# Patient Record
Sex: FEMALE | Race: WHITE | Employment: OTHER | URBAN - METROPOLITAN AREA
[De-identification: names, ages, dates, MRNs, and addresses within clinical notes are randomized per-mention and may not be internally consistent; named-entity substitution may affect disease eponyms.]

---

## 2017-01-05 ENCOUNTER — OFFICE VISIT (OUTPATIENT)
Dept: INTERNAL MEDICINE CLINIC | Facility: CLINIC | Age: 77
End: 2017-01-05

## 2017-01-05 ENCOUNTER — TELEPHONE (OUTPATIENT)
Dept: INTERNAL MEDICINE CLINIC | Facility: CLINIC | Age: 77
End: 2017-01-05

## 2017-01-05 VITALS
WEIGHT: 163 LBS | HEIGHT: 61 IN | BODY MASS INDEX: 30.78 KG/M2 | RESPIRATION RATE: 16 BRPM | HEART RATE: 58 BPM | DIASTOLIC BLOOD PRESSURE: 100 MMHG | SYSTOLIC BLOOD PRESSURE: 235 MMHG

## 2017-01-05 DIAGNOSIS — I10 ESSENTIAL HYPERTENSION: ICD-10-CM

## 2017-01-05 DIAGNOSIS — M43.16 SPONDYLOLISTHESIS OF LUMBAR REGION: Primary | ICD-10-CM

## 2017-01-05 DIAGNOSIS — S52.509A CLOSED FRACTURE OF DISTAL END OF RADIUS, UNSPECIFIED FRACTURE MORPHOLOGY, INITIAL ENCOUNTER: ICD-10-CM

## 2017-01-05 PROCEDURE — G0463 HOSPITAL OUTPT CLINIC VISIT: HCPCS | Performed by: INTERNAL MEDICINE

## 2017-01-05 PROCEDURE — 99213 OFFICE O/P EST LOW 20 MIN: CPT | Performed by: INTERNAL MEDICINE

## 2017-01-05 RX ORDER — SPIRONOLACTONE 25 MG/1
25 TABLET ORAL 2 TIMES DAILY
Qty: 60 TABLET | Refills: 1 | Status: SHIPPED | OUTPATIENT
Start: 2017-01-05 | End: 2017-01-23

## 2017-01-05 RX ORDER — POTASSIUM CITRATE 10 MEQ/1
TABLET, EXTENDED RELEASE ORAL
Qty: 60 TABLET | Refills: 11 | Status: SHIPPED | OUTPATIENT
Start: 2017-01-05 | End: 2017-01-23

## 2017-01-05 RX ORDER — HYDROCODONE BITARTRATE AND ACETAMINOPHEN 5; 325 MG/1; MG/1
1 TABLET ORAL 2 TIMES DAILY PRN
Qty: 30 TABLET | Refills: 0 | Status: SHIPPED | OUTPATIENT
Start: 2017-01-05 | End: 2017-03-02

## 2017-01-05 RX ORDER — LABETALOL 300 MG/1
300 TABLET, FILM COATED ORAL 2 TIMES DAILY
Qty: 60 TABLET | Refills: 0 | Status: SHIPPED | OUTPATIENT
Start: 2017-01-05 | End: 2017-01-23

## 2017-01-05 RX ORDER — AMLODIPINE BESYLATE 2.5 MG/1
7.5 TABLET ORAL DAILY
Qty: 30 TABLET | Refills: 0 | Status: SHIPPED | OUTPATIENT
Start: 2017-01-05 | End: 2017-01-23

## 2017-01-05 NOTE — PROGRESS NOTES
New pt visit for me  Past Medical History   Diagnosis Date   • Unspecified essential hypertension    • Peripheral vascular disease ([address]      stents in left iliac and left femoral artery and both proximal renal arteries   • Renal artery stenosis (HCC)    • History Narrative    The patient does use an assistive device. Wheeled walker    The patient does not live in a home with stairs.          Family History   Problem Relation Age of Onset   • Cancer Father      Lung   • Lung Disorder Mother      Emphysema   • lamoTRIgine (LAMICTAL) 150 MG Oral Tab, Take 1 tablet by mouth 2 (two) times daily. , Disp: , Rfl:   •  QUEtiapine Fumarate (SEROQUEL) 50 MG Oral Tab, Take 1 tablet by mouth nightly., Disp: , Rfl:   •  ROPINIRole HCl (REQUIP) 4 MG Oral Tab, , Disp: , Rfl:

## 2017-01-05 NOTE — TELEPHONE ENCOUNTER
Potassium Citrate ER 10 MEQ (1080 MG) Oral Tab CR and  spironolactone 25 MG Oral Tab was prescribe today. Pharmacy stts when taking medication together potassium level will increase.   Pharmacy is requesting most recent potassium level to dispense medicat

## 2017-01-06 NOTE — TELEPHONE ENCOUNTER
Please call pharmacy they can dispense Spironolactone, but potassium should be clarified with PCP I do not know patient and sometimes it is okay to take both medications, so clarified with Dr. Danita Jones tomorrow about potassium prescription

## 2017-01-06 NOTE — TELEPHONE ENCOUNTER
Informed Pharmacy to dispense Spironolactone and hold potassium until Dr. Severino Fulling review and advise. Verbalized understanding.

## 2017-01-23 ENCOUNTER — TELEPHONE (OUTPATIENT)
Dept: INTERNAL MEDICINE CLINIC | Facility: CLINIC | Age: 77
End: 2017-01-23

## 2017-01-23 RX ORDER — LABETALOL 300 MG/1
300 TABLET, FILM COATED ORAL 2 TIMES DAILY
Qty: 60 TABLET | Refills: 0 | Status: SHIPPED | OUTPATIENT
Start: 2017-01-23 | End: 2017-05-11

## 2017-01-23 RX ORDER — AMLODIPINE BESYLATE 2.5 MG/1
7.5 TABLET ORAL DAILY
Qty: 30 TABLET | Refills: 0 | Status: SHIPPED | OUTPATIENT
Start: 2017-01-23 | End: 2017-01-24

## 2017-01-23 RX ORDER — SPIRONOLACTONE 25 MG/1
25 TABLET ORAL 2 TIMES DAILY
Qty: 60 TABLET | Refills: 1 | Status: SHIPPED | OUTPATIENT
Start: 2017-01-23 | End: 2017-05-11

## 2017-01-23 RX ORDER — POTASSIUM CITRATE 10 MEQ/1
TABLET, EXTENDED RELEASE ORAL
Qty: 60 TABLET | Refills: 11 | Status: SHIPPED | OUTPATIENT
Start: 2017-01-23 | End: 2017-03-02

## 2017-01-23 RX ORDER — PANTOPRAZOLE SODIUM 40 MG/1
TABLET, DELAYED RELEASE ORAL
Qty: 90 TABLET | Refills: 0 | Status: SHIPPED | OUTPATIENT
Start: 2017-01-23 | End: 2017-05-11

## 2017-01-23 NOTE — TELEPHONE ENCOUNTER
Per pharmacy on file it is pt new pharmacy and need pt med send to them: ,    Atorvastatin Calcium 40 MG   Hydralezin  Effient  Amlodipine Besylate  Pantoprazole  Per pharmacy and whatever pt current rx med on file. Current Outpatient Prescriptions:   AmLO

## 2017-01-24 ENCOUNTER — TELEPHONE (OUTPATIENT)
Dept: INTERNAL MEDICINE CLINIC | Facility: CLINIC | Age: 77
End: 2017-01-24

## 2017-01-24 RX ORDER — HYDRALAZINE HYDROCHLORIDE 50 MG/1
50 TABLET, FILM COATED ORAL 3 TIMES DAILY
Qty: 90 TABLET | Refills: 2 | Status: SHIPPED | OUTPATIENT
Start: 2017-01-24 | End: 2017-03-02

## 2017-01-24 RX ORDER — AMLODIPINE BESYLATE 2.5 MG/1
7.5 TABLET ORAL DAILY
Qty: 90 TABLET | Refills: 2 | Status: SHIPPED | OUTPATIENT
Start: 2017-01-24 | End: 2017-03-02

## 2017-01-24 NOTE — TELEPHONE ENCOUNTER
Refill Protocol Appointment Criteria Gastrointestional Medication:  Medication refilled for 90 days per protocol.     · Appointment scheduled in the past 12 months or in the next 3 months  Recent Visits       Provider Department Primary Dx    2 weeks ago Fi

## 2017-01-25 ENCOUNTER — TELEPHONE (OUTPATIENT)
Dept: INTERNAL MEDICINE CLINIC | Facility: CLINIC | Age: 77
End: 2017-01-25

## 2017-01-25 RX ORDER — ATORVASTATIN CALCIUM 40 MG/1
40 TABLET, FILM COATED ORAL NIGHTLY
Qty: 30 TABLET | Refills: 2 | Status: SHIPPED | OUTPATIENT
Start: 2017-01-25 | End: 2017-05-11

## 2017-01-25 NOTE — TELEPHONE ENCOUNTER
Pharmacy requesting to clarify dose and instructions for AmLODIPine Besylate 2.5 MG Oral Tab                                                                                   Sig :  Take 3 tablets (7.5 mg total) by mouth daily

## 2017-01-25 NOTE — TELEPHONE ENCOUNTER
Cholesterol Medications  Protocol Criteria:  · Appointment scheduled in the past 12 months or in the next 3 months  · ALT & LDL on file in the past 12 months  · ALT result < 80  · LDL result <130   Recent Visits       Provider Department Primary Dx    2 we

## 2017-02-23 ENCOUNTER — TELEPHONE (OUTPATIENT)
Dept: INTERNAL MEDICINE CLINIC | Facility: CLINIC | Age: 77
End: 2017-02-23

## 2017-02-23 NOTE — TELEPHONE ENCOUNTER
Pharmacy calling to check on status of RX for Ferrous Sulfate 325 MG (65FE)  Pharmacy states patient used to get this at a different pharmacy and different doctor  and now will get it at [facility] please

## 2017-02-28 RX ORDER — MELATONIN
325 2 TIMES DAILY WITH MEALS
Qty: 60 TABLET | Refills: 7 | Status: SHIPPED | OUTPATIENT
Start: 2017-02-28 | End: 2017-03-02

## 2017-03-02 ENCOUNTER — OFFICE VISIT (OUTPATIENT)
Dept: INTERNAL MEDICINE CLINIC | Facility: CLINIC | Age: 77
End: 2017-03-02

## 2017-03-02 VITALS
HEIGHT: 61 IN | WEIGHT: 164 LBS | SYSTOLIC BLOOD PRESSURE: 193 MMHG | BODY MASS INDEX: 30.96 KG/M2 | HEART RATE: 70 BPM | TEMPERATURE: 98 F | DIASTOLIC BLOOD PRESSURE: 82 MMHG

## 2017-03-02 DIAGNOSIS — M48.04 THORACIC SPINAL STENOSIS: ICD-10-CM

## 2017-03-02 DIAGNOSIS — E11.22 CKD STAGE 3 DUE TO TYPE 2 DIABETES MELLITUS (HCC): ICD-10-CM

## 2017-03-02 DIAGNOSIS — E11.9 WELL CONTROLLED TYPE 2 DIABETES MELLITUS (HCC): ICD-10-CM

## 2017-03-02 DIAGNOSIS — F31.9 BIPOLAR 1 DISORDER (HCC): ICD-10-CM

## 2017-03-02 DIAGNOSIS — I25.10 CORONARY ARTERY DISEASE DUE TO LIPID RICH PLAQUE: ICD-10-CM

## 2017-03-02 DIAGNOSIS — G89.4 CHRONIC PAIN SYNDROME: ICD-10-CM

## 2017-03-02 DIAGNOSIS — Z79.899 POLYPHARMACY: ICD-10-CM

## 2017-03-02 DIAGNOSIS — N18.30 CKD STAGE 3 DUE TO TYPE 2 DIABETES MELLITUS (HCC): ICD-10-CM

## 2017-03-02 DIAGNOSIS — I73.9 PAD (PERIPHERAL ARTERY DISEASE) (HCC): ICD-10-CM

## 2017-03-02 DIAGNOSIS — I10 ESSENTIAL HYPERTENSION: Primary | ICD-10-CM

## 2017-03-02 DIAGNOSIS — M54.40 CHRONIC LOW BACK PAIN WITH SCIATICA, SCIATICA LATERALITY UNSPECIFIED, UNSPECIFIED BACK PAIN LATERALITY: ICD-10-CM

## 2017-03-02 DIAGNOSIS — M48.061 LUMBAR SPINAL STENOSIS: ICD-10-CM

## 2017-03-02 DIAGNOSIS — Z00.00 ENCOUNTER FOR ANNUAL HEALTH EXAMINATION: ICD-10-CM

## 2017-03-02 DIAGNOSIS — I25.83 CORONARY ARTERY DISEASE DUE TO LIPID RICH PLAQUE: ICD-10-CM

## 2017-03-02 DIAGNOSIS — Z00.00 ANNUAL PHYSICAL EXAM: ICD-10-CM

## 2017-03-02 DIAGNOSIS — F17.200 SMOKER UNMOTIVATED TO QUIT: ICD-10-CM

## 2017-03-02 DIAGNOSIS — G89.29 CHRONIC LOW BACK PAIN WITH SCIATICA, SCIATICA LATERALITY UNSPECIFIED, UNSPECIFIED BACK PAIN LATERALITY: ICD-10-CM

## 2017-03-02 PROBLEM — Z86.73 STATUS POST CVA: Status: RESOLVED | Noted: 2017-03-02 | Resolved: 2017-03-02

## 2017-03-02 PROBLEM — Z86.73 STATUS POST CVA: Status: ACTIVE | Noted: 2017-03-02

## 2017-03-02 PROBLEM — Z74.09 IMPAIRED MOBILITY: Status: ACTIVE | Noted: 2017-03-02

## 2017-03-02 PROCEDURE — G0439 PPPS, SUBSEQ VISIT: HCPCS | Performed by: INTERNAL MEDICINE

## 2017-03-02 RX ORDER — HYDRALAZINE HYDROCHLORIDE 50 MG/1
50 TABLET, FILM COATED ORAL 3 TIMES DAILY
Qty: 90 TABLET | Refills: 3 | Status: SHIPPED | OUTPATIENT
Start: 2017-03-02 | End: 2018-06-23

## 2017-03-02 RX ORDER — LAMOTRIGINE 150 MG/1
150 TABLET ORAL 2 TIMES DAILY
Qty: 60 TABLET | Refills: 3 | Status: CANCELLED | OUTPATIENT
Start: 2017-03-02

## 2017-03-02 RX ORDER — METOPROLOL SUCCINATE 50 MG/1
50 TABLET, EXTENDED RELEASE ORAL DAILY
Qty: 90 TABLET | Refills: 3 | Status: SHIPPED | OUTPATIENT
Start: 2017-03-02 | End: 2018-02-25

## 2017-03-02 RX ORDER — LABETALOL 300 MG/1
300 TABLET, FILM COATED ORAL 2 TIMES DAILY
Qty: 60 TABLET | Refills: 3 | Status: ON HOLD | OUTPATIENT
Start: 2017-03-02 | End: 2018-06-08

## 2017-03-02 RX ORDER — POTASSIUM CITRATE 10 MEQ/1
TABLET, EXTENDED RELEASE ORAL
Qty: 60 TABLET | Refills: 11 | Status: ON HOLD | OUTPATIENT
Start: 2017-03-02 | End: 2018-06-08

## 2017-03-02 RX ORDER — NAPROXEN 500 MG/1
500 TABLET ORAL 2 TIMES DAILY WITH MEALS
Qty: 60 TABLET | Refills: 3 | Status: SHIPPED | OUTPATIENT
Start: 2017-03-02 | End: 2018-02-25

## 2017-03-02 RX ORDER — MELATONIN
325 2 TIMES DAILY WITH MEALS
Qty: 60 TABLET | Refills: 3 | Status: SHIPPED | OUTPATIENT
Start: 2017-03-02 | End: 2018-12-14

## 2017-03-03 NOTE — PATIENT INSTRUCTIONS
Tanisha Patino's SCREENING SCHEDULE   Tests on this list are recommended by your physician but may not be covered, or covered at this frequency, by your insurer. Please check with your insurance carrier before scheduling to verify coverage.    PREVENTATI Men who are 73-68 years old and have smoked more than 100 cigarettes in their lifetime   • Anyone with a family history    Colorectal Cancer Screening  Covered up to Age 76     Colonoscopy Screen   Covered every 10 years- more often if abnormal Colonoscopy visit on 11/14/14  -FLU VACC PRSV FREE INC ANTIG    Please get every year    Pneumococcal 13 (Prevnar)  Covered Once after 65 No orders found for this or any previous visit.  Please get once after your 65th birthday    Pneumococcal 23 (Pneumovax)  Covered O

## 2017-03-03 NOTE — PROGRESS NOTES
HPI:   Nelson Arreola is a 68year old female who presents for a Medicare Initial Annual Wellness visit (Once after 12 month Medicare anniversery) .     New to our practice today was hospitalized at [facility] November 2016  Annual Physical due on (50 mg total) by mouth 3 (three) times daily. Labetalol HCl 300 MG Oral Tab Take 1 tablet (300 mg total) by mouth 2 (two) times daily. Potassium Citrate ER 10 MEQ (1080 MG) Oral Tab CR TAKE 1 TABLET BY MOUTH 2 TIMES DAILY AFTER A MEAL.  MUST TAKE WITH F colonoscopy (N/A, 11/26/2016); and egd (N/A, 11/26/2016). Her family history includes Cancer in her father, mother, and another family member; Diabetes in her brother and mother; Lung Disorder in her mother.    SOCIAL HISTORY:   She  reports that she has No   I get confused about where sounds come from:  No I misunderstand some   words in a sentence and need to ask people to repeat themselves:  No   I especially have trouble understanding the speech of women and children:    No I have trouble understanding Skin color, texture, turgor normal, no rashes or lesions   Lymph nodes: Cervical, supraclavicular, and axillary nodes normal   Neurologic: Normal       SUGGESTED VACCINATIONS - Influenza, Pneumococcal, Zoster, Tetanus     Immunization History   Administere 150 MG Oral Tab; Take 1 tablet (150 mg total) by mouth 2 (two) times daily.  -     naproxen 500 MG Oral Tab; Take 1 tablet (500 mg total) by mouth 2 (two) times daily with meals. -     Metoprolol Succinate ER (TOPROL XL) 50 MG Oral Tablet 24 Hr;  Take 1 ta and has it on her medication list.     Diet assessment: good     Advanced Directive:  Living Will on file in [address]? Chanel Grewal does not have a Living Will on file in [address]  Discussed with patient and provided information      [address] accidently lose urine?: 0-No    Do you have difficulty seeing?: 0-No    Do you have any difficulty walking or getting up?: 1-Yes    Do you have any tripping hazards?: 0-No    Are you on multiple medications?: 1-Yes    Does pain affect your day to day activ Screen every 10 years Colonoscopy,10 Years due on 11/26/2026 Update Health Maintenance if applicable    Flex Sigmoidoscopy Screen every 10 years No results found for this or any previous visit. No flowsheet data found.      Fecal Occult Blood Annually OCC 09/29/2016 1.28    No flowsheet data found. BUN  Annually BUN (mg/dL)   Date Value   11/25/2016 32*   09/29/2016 16    No flowsheet data found. Drug Serum Conc  Annually No results found for: DIGOXIN, DIG, VALP No flowsheet data found.     Diabetes

## 2017-03-04 ENCOUNTER — TELEPHONE (OUTPATIENT)
Dept: INTERNAL MEDICINE CLINIC | Facility: CLINIC | Age: 77
End: 2017-03-04

## 2017-03-04 NOTE — TELEPHONE ENCOUNTER
Message from Jules Wilder MD sent at 3/2/2017  6:20 PM CST -----        Pt was referred to Optho and POdiatry         Please call her to let herknow       Also please check [employer] to see if a pneumovax history is available      Patient called and in

## 2017-03-06 ENCOUNTER — LAB ENCOUNTER (OUTPATIENT)
Dept: LAB | Age: 77
End: 2017-03-06
Attending: INTERNAL MEDICINE
Payer: MEDICARE

## 2017-03-06 ENCOUNTER — PHARMACIST (OUTPATIENT)
Dept: FAMILY MEDICINE CLINIC | Facility: CLINIC | Age: 77
End: 2017-03-06

## 2017-03-06 VITALS — HEART RATE: 66 BPM | DIASTOLIC BLOOD PRESSURE: 78 MMHG | SYSTOLIC BLOOD PRESSURE: 188 MMHG

## 2017-03-06 DIAGNOSIS — G89.4 CHRONIC PAIN SYNDROME: ICD-10-CM

## 2017-03-06 DIAGNOSIS — E11.9 WELL CONTROLLED TYPE 2 DIABETES MELLITUS (HCC): ICD-10-CM

## 2017-03-06 DIAGNOSIS — M48.04 THORACIC SPINAL STENOSIS: ICD-10-CM

## 2017-03-06 DIAGNOSIS — M48.061 LUMBAR SPINAL STENOSIS: ICD-10-CM

## 2017-03-06 DIAGNOSIS — Z00.00 ANNUAL PHYSICAL EXAM: ICD-10-CM

## 2017-03-06 DIAGNOSIS — I10 ESSENTIAL HYPERTENSION: Primary | ICD-10-CM

## 2017-03-06 LAB
ALBUMIN SERPL BCP-MCNC: 4.2 G/DL (ref 3.5–4.8)
ALBUMIN/GLOB SERPL: 1.9 {RATIO} (ref 1–2)
ALP SERPL-CCNC: 84 U/L (ref 32–100)
ALT SERPL-CCNC: 57 U/L (ref 14–54)
ANION GAP SERPL CALC-SCNC: 10 MMOL/L (ref 0–18)
AST SERPL-CCNC: 59 U/L (ref 15–41)
BASOPHILS # BLD: 0.1 K/UL (ref 0–0.2)
BASOPHILS NFR BLD: 1 %
BILIRUB SERPL-MCNC: 0.7 MG/DL (ref 0.3–1.2)
BUN SERPL-MCNC: 23 MG/DL (ref 8–20)
BUN/CREAT SERPL: 19.8 (ref 10–20)
CALCIUM SERPL-MCNC: 10.2 MG/DL (ref 8.5–10.5)
CHLORIDE SERPL-SCNC: 106 MMOL/L (ref 95–110)
CO2 SERPL-SCNC: 26 MMOL/L (ref 22–32)
CREAT SERPL-MCNC: 1.16 MG/DL (ref 0.5–1.5)
EOSINOPHIL # BLD: 0.2 K/UL (ref 0–0.7)
EOSINOPHIL NFR BLD: 4 %
ERYTHROCYTE [DISTWIDTH] IN BLOOD BY AUTOMATED COUNT: 16.3 % (ref 11–15)
GLOBULIN PLAS-MCNC: 2.2 G/DL (ref 2.5–3.7)
GLUCOSE SERPL-MCNC: 135 MG/DL (ref 70–99)
HCT VFR BLD AUTO: 42.6 % (ref 35–48)
HGB BLD-MCNC: 14 G/DL (ref 12–16)
LYMPHOCYTES # BLD: 1.3 K/UL (ref 1–4)
LYMPHOCYTES NFR BLD: 23 %
MCH RBC QN AUTO: 28 PG (ref 27–32)
MCHC RBC AUTO-ENTMCNC: 32.9 G/DL (ref 32–37)
MCV RBC AUTO: 85.2 FL (ref 80–100)
MONOCYTES # BLD: 0.3 K/UL (ref 0–1)
MONOCYTES NFR BLD: 5 %
NEUTROPHILS # BLD AUTO: 3.8 K/UL (ref 1.8–7.7)
NEUTROPHILS NFR BLD: 67 %
OSMOLALITY UR CALC.SUM OF ELEC: 300 MOSM/KG (ref 275–295)
PLATELET # BLD AUTO: 303 K/UL (ref 140–400)
PMV BLD AUTO: 8 FL (ref 7.4–10.3)
POTASSIUM SERPL-SCNC: 4.2 MMOL/L (ref 3.3–5.1)
PROT SERPL-MCNC: 6.4 G/DL (ref 5.9–8.4)
RBC # BLD AUTO: 5 M/UL (ref 3.7–5.4)
SODIUM SERPL-SCNC: 142 MMOL/L (ref 136–144)
TSH SERPL-ACNC: 1.29 UIU/ML (ref 0.34–5.6)
WBC # BLD AUTO: 5.7 K/UL (ref 4–11)

## 2017-03-06 PROCEDURE — 80053 COMPREHEN METABOLIC PANEL: CPT

## 2017-03-06 PROCEDURE — 83036 HEMOGLOBIN GLYCOSYLATED A1C: CPT

## 2017-03-06 PROCEDURE — 85025 COMPLETE CBC W/AUTO DIFF WBC: CPT

## 2017-03-06 PROCEDURE — 84443 ASSAY THYROID STIM HORMONE: CPT

## 2017-03-06 PROCEDURE — 36415 COLL VENOUS BLD VENIPUNCTURE: CPT

## 2017-03-06 RX ORDER — DIAZEPAM 10 MG/1
20 TABLET ORAL NIGHTLY PRN
Status: ON HOLD | COMMUNITY
End: 2018-06-08

## 2017-03-06 NOTE — PROGRESS NOTES
Deepthi Campos is a 68year old patient coming in to see the clinical pharmacist for an initial medication therapy management visit.     HPI: Patient presents with all of her medication bottles and her caretaker nurse who visits her twice weekly for around is listed on her medication list   Adherence: Patient reported that she skiped her morning medications today because she woke up much later than usual. She states this does not occur often.    Timing of dosing: Patient is aware of when to take her meds and RENAL/HEPATIC IMPAIRMENT DOSING: GFR of 30-49 mL/min, maximum dose of spironolactone recommended is 25 mg once daily. Patient's GFR from 11/25/16 was 42 mL/min, and she is currently taking spironolactone 25 mg 1 tab PO BID.      IMMUNIZATIONS: Patient r • Diabetes Brother        SH:   Social History   Marital Status:   Spouse Name: N/A    Years of Education: N/A  Number of Children: N/A     Occupational History  None on file     Social History Main Topics   Smoking status: Current Every Day Smok Tab Take 1 tablet (25 mg total) by mouth 2 (two) times daily.  Disp: 60 tablet Rfl: 1   Pantoprazole Sodium 40 MG Oral Tab EC TAKE 1 TABLET BY MOUTH ONCE A DAY BEFORE BREAKFAST Disp: 90 tablet Rfl: 0   mirtazapine (REMERON) 15 MG Oral Tab Take 15 mg by mout avoid restless leg syndrome symptoms. Patient reports that they may try this change and will see how they respond.   -Explained to patient that when ASA 81 ER is taken concurrently with NSAIDS it diminishes the cardioprotective effects.  Advised patient to -Strongly encouraged patient to follow up with pain clinic for pain, as recommended by her PCP as well. She plans to discuss medication and surgical options with them.      4. Bipolar/Anxiety:  -Patient recently self-discontinued lamotrigine due to advers

## 2017-03-07 ENCOUNTER — TELEPHONE (OUTPATIENT)
Dept: INTERNAL MEDICINE CLINIC | Facility: CLINIC | Age: 77
End: 2017-03-07

## 2017-03-07 LAB — HBA1C MFR BLD: 7.1 % (ref 4–6)

## 2017-03-07 RX ORDER — AMLODIPINE BESYLATE 5 MG/1
5 TABLET ORAL DAILY
Qty: 30 TABLET | Refills: 1 | Status: SHIPPED | OUTPATIENT
Start: 2017-03-07 | End: 2017-05-11

## 2017-03-07 NOTE — TELEPHONE ENCOUNTER
Patient called and handed the phone to her cousin, Mandy Gao, to describe an event that occurred last night.  Simi Martínez states that when the patient came home in the afternoon she took her morning medications that she had missed earlier that day, ate dinne

## 2017-03-07 NOTE — PROGRESS NOTES
Sounds good and I agree metoprolol will have benefits over labetalol at the very least decreasing pill burden.    Called patient and spoke with Homero Hogan (phone for future reference: [phone]), the cousin that the patient informed me she was comfortable dis

## 2017-03-07 NOTE — PROGRESS NOTES
i agree with 2 and 3   I want her on metoprolol instead of labatelol because of her CAD I think it will give her more cardioprotection

## 2017-03-08 NOTE — TELEPHONE ENCOUNTER
Dr. Adeline Doherty we looked at [address] [place] [address] and cannot find any record of Pneumovax.

## 2017-04-05 ENCOUNTER — TELEPHONE (OUTPATIENT)
Dept: INTERNAL MEDICINE CLINIC | Facility: CLINIC | Age: 77
End: 2017-04-05

## 2017-05-04 ENCOUNTER — TELEPHONE (OUTPATIENT)
Dept: FAMILY MEDICINE CLINIC | Facility: CLINIC | Age: 77
End: 2017-05-04

## 2017-05-04 NOTE — TELEPHONE ENCOUNTER
Actions Requested: requesting pain medication  Situation/Background   Problem: back pain/sciatica   Onset: 2 weeks   Associated Symptoms: radiates down the right leg.    History of Same: yes   Precipitated By: not sure   Aggravating/Alleviating Factors: not

## 2017-05-04 NOTE — TELEPHONE ENCOUNTER
Odalys Fontaine MD   [facility] Clinical Staff   7 minutes ago   (3:44 PM)    zostrix prescribed call pt (Routing comment)

## 2017-05-12 RX ORDER — SPIRONOLACTONE 25 MG/1
TABLET ORAL
Qty: 60 TABLET | Refills: 0 | Status: SHIPPED | OUTPATIENT
Start: 2017-05-12 | End: 2017-06-12

## 2017-05-12 RX ORDER — PANTOPRAZOLE SODIUM 40 MG/1
TABLET, DELAYED RELEASE ORAL
Qty: 90 TABLET | Refills: 0 | Status: SHIPPED | OUTPATIENT
Start: 2017-05-12 | End: 2017-06-12

## 2017-05-12 RX ORDER — ATORVASTATIN CALCIUM 40 MG/1
TABLET, FILM COATED ORAL
Qty: 30 TABLET | Refills: 0 | Status: SHIPPED | OUTPATIENT
Start: 2017-05-12 | End: 2017-06-12

## 2017-05-12 RX ORDER — AMLODIPINE BESYLATE 5 MG/1
TABLET ORAL
Qty: 30 TABLET | Refills: 0 | Status: SHIPPED | OUTPATIENT
Start: 2017-05-12 | End: 2017-06-15

## 2017-05-12 RX ORDER — LABETALOL 300 MG/1
TABLET, FILM COATED ORAL
Qty: 60 TABLET | Refills: 0 | Status: SHIPPED | OUTPATIENT
Start: 2017-05-12 | End: 2017-06-12

## 2017-06-05 ENCOUNTER — TELEPHONE (OUTPATIENT)
Dept: INTERNAL MEDICINE CLINIC | Facility: CLINIC | Age: 77
End: 2017-06-05

## 2017-06-05 DIAGNOSIS — M54.50 LUMBAR SPINE PAIN: Primary | ICD-10-CM

## 2017-06-05 NOTE — TELEPHONE ENCOUNTER
Patient reports experiencing severe pain that she states is sciatica. She is currently unable to get out of her condo due to the severe pain and cannot come in for an appt.  She does not have a phone currently, but states her caregiver Elim Reinier) can be reached

## 2017-06-05 NOTE — TELEPHONE ENCOUNTER
----- Message from Kaushik Estrada MD sent at 6/5/2017  3:55 PM CDT -----        Pain management referral in chart call pt

## 2017-06-05 NOTE — TELEPHONE ENCOUNTER
Actions Requested: requesting Pain med,Referral to back Surgeon that did Dr Mitali Hernández used - aware Dr out of office til Thursday,Nurse please call # above it her care giver number on Mon/Thurs from 12-4 PM  Situation/Background   Problem: back pain-radiatin of a leg or foot (e.g., unable to bear weight, dragging foot)  * Patient sounds very sick or weak to the triager  * Severe back pain  * Fever > 100.5 F (38.1 C) and flank pain  * Can't walk or can barely walk  * Tingling or numbness in the legs or feet  *

## 2017-06-05 NOTE — TELEPHONE ENCOUNTER
Shabnam Schreiber of Dr. Adore Gaona note and number provided to the pain clinic. Elizabeth Yee verbalized understanding and will let the patient know.

## 2017-06-09 NOTE — TELEPHONE ENCOUNTER
Patient called back. Patient states she hasn't seen her caregiver Major Gonzalez yet (who has referral info) I went over all referral pain clinic info and provided phone # to call. Patient to schedule appt. Patient verbalized understanding.

## 2017-06-12 RX ORDER — ATORVASTATIN CALCIUM 40 MG/1
TABLET, FILM COATED ORAL
Qty: 30 TABLET | Refills: 0 | Status: ON HOLD | OUTPATIENT
Start: 2017-06-12 | End: 2018-06-01

## 2017-06-12 RX ORDER — SPIRONOLACTONE 25 MG/1
TABLET ORAL
Qty: 60 TABLET | Refills: 0 | Status: SHIPPED | OUTPATIENT
Start: 2017-06-12 | End: 2017-08-16

## 2017-06-12 RX ORDER — ATORVASTATIN CALCIUM 40 MG/1
TABLET, FILM COATED ORAL
Qty: 30 TABLET | Refills: 0 | Status: SHIPPED | OUTPATIENT
Start: 2017-06-12 | End: 2018-06-23

## 2017-06-12 RX ORDER — LABETALOL 300 MG/1
TABLET, FILM COATED ORAL
Qty: 60 TABLET | Refills: 0 | Status: SHIPPED | OUTPATIENT
Start: 2017-06-12 | End: 2017-09-22

## 2017-06-12 RX ORDER — PANTOPRAZOLE SODIUM 40 MG/1
TABLET, DELAYED RELEASE ORAL
Qty: 90 TABLET | Refills: 0 | Status: SHIPPED | OUTPATIENT
Start: 2017-06-12 | End: 2017-11-05

## 2017-06-15 RX ORDER — AMLODIPINE BESYLATE 5 MG/1
TABLET ORAL
Qty: 30 TABLET | Refills: 0 | Status: ON HOLD | OUTPATIENT
Start: 2017-06-15 | End: 2018-06-08

## 2017-06-22 ENCOUNTER — HOSPITAL ENCOUNTER (EMERGENCY)
Facility: HOSPITAL | Age: 77
Discharge: HOME | End: 2017-06-22
Attending: EMERGENCY MEDICINE
Payer: MEDICARE

## 2017-06-22 VITALS
SYSTOLIC BLOOD PRESSURE: 151 MMHG | TEMPERATURE: 98 F | WEIGHT: 150 LBS | HEART RATE: 57 BPM | HEIGHT: 61 IN | OXYGEN SATURATION: 92 % | RESPIRATION RATE: 20 BRPM | DIASTOLIC BLOOD PRESSURE: 66 MMHG | BODY MASS INDEX: 28.32 KG/M2

## 2017-06-22 DIAGNOSIS — M54.50 ACUTE EXACERBATION OF CHRONIC LOW BACK PAIN: Primary | ICD-10-CM

## 2017-06-22 DIAGNOSIS — G89.29 ACUTE EXACERBATION OF CHRONIC LOW BACK PAIN: Primary | ICD-10-CM

## 2017-06-22 PROCEDURE — 96372 THER/PROPH/DIAG INJ SC/IM: CPT

## 2017-06-22 PROCEDURE — 99283 EMERGENCY DEPT VISIT LOW MDM: CPT

## 2017-06-22 RX ORDER — GABAPENTIN 300 MG/1
300 CAPSULE ORAL NIGHTLY
Qty: 30 CAPSULE | Refills: 0 | Status: SHIPPED | OUTPATIENT
Start: 2017-06-22 | End: 2018-12-14

## 2017-06-22 RX ORDER — HYDROMORPHONE HYDROCHLORIDE 1 MG/ML
INJECTION, SOLUTION INTRAMUSCULAR; INTRAVENOUS; SUBCUTANEOUS
Status: COMPLETED
Start: 2017-06-22 | End: 2017-06-22

## 2017-06-22 RX ORDER — HYDROMORPHONE HYDROCHLORIDE 1 MG/ML
1 INJECTION, SOLUTION INTRAMUSCULAR; INTRAVENOUS; SUBCUTANEOUS ONCE
Status: COMPLETED | OUTPATIENT
Start: 2017-06-22 | End: 2017-06-22

## 2017-06-22 RX ORDER — HYDROMORPHONE HYDROCHLORIDE 1 MG/ML
0.5 INJECTION, SOLUTION INTRAMUSCULAR; INTRAVENOUS; SUBCUTANEOUS ONCE
Status: DISCONTINUED | OUTPATIENT
Start: 2017-06-22 | End: 2017-06-22

## 2017-06-22 NOTE — ED INITIAL ASSESSMENT (HPI)
Pt from home with c/o low back pain. History of back pain. No trauma.  States pain radiates to her right thigh, denies dysuria

## 2017-06-23 NOTE — ED PROVIDER NOTES
Patient Seen in: [facility] Emergency Department    History   Patient presents with:  Back Pain (musculoskeletal)    Stated Complaint: back pain    HPI    Patient is a 78-year-old female who presents to the emergency department with a chief compla HYSTERECTOMY      WRIST FRACTURE SURGERY      Comment left wrist    BRAIN SURGERY      Comment 7 years ago     COLONOSCOPY N/A 11/26/2016    Comment Procedure: COLONOSCOPY;  Surgeon: Francois Ch MD;  Location: [facility] ENDOSCOPY    EGD N/A 11/26/2016    Devan mirtazapine (REMERON) 15 MG Oral Tab,  Take 15 mg by mouth nightly.      acetaminophen (TYLENOL EXTRA STRENGTH) 500 MG Oral Tab,  Take 500 mg by mouth every 6 (six) hours as needed for Pain.   lamoTRIgine (LAMICTAL) 150 MG Oral Tab,  Take 1 tablet by mouth Cardiovascular: Normal rate, regular rhythm and normal heart sounds. Pulmonary/Chest: Effort normal.   Abdominal: Soft. Bowel sounds are normal.   Musculoskeletal: Normal range of motion. Lumbar back: She exhibits pain.  She exhibits normal range

## 2017-08-16 DIAGNOSIS — I10 UNCONTROLLED HYPERTENSION: Primary | ICD-10-CM

## 2017-08-18 RX ORDER — SPIRONOLACTONE 25 MG/1
TABLET ORAL
Qty: 60 TABLET | Refills: 0 | Status: SHIPPED | OUTPATIENT
Start: 2017-08-18 | End: 2017-11-20

## 2017-08-18 NOTE — TELEPHONE ENCOUNTER
One-month refilled. Patient will need to make an appointment with a physician for any further refills. Please let patient know.

## 2017-09-07 ENCOUNTER — TELEPHONE (OUTPATIENT)
Dept: INTERNAL MEDICINE CLINIC | Facility: CLINIC | Age: 77
End: 2017-09-07

## 2017-09-07 NOTE — TELEPHONE ENCOUNTER
Unable to fill forms until pt comes in for an appointment. Needs to establish care with a provider as Dr. Alexus Del Rio is no longer at practice.

## 2017-09-07 NOTE — TELEPHONE ENCOUNTER
Received a physician order for Lumbar Orthosis Brace. I called pt as I need to gather more information to be able to complete form. No response. Unable to leave message.

## 2017-09-12 RX ORDER — HYDRALAZINE HYDROCHLORIDE 50 MG/1
TABLET, FILM COATED ORAL
Qty: 90 TABLET | Refills: 0 | OUTPATIENT
Start: 2017-09-12

## 2017-09-22 RX ORDER — LABETALOL 300 MG/1
TABLET, FILM COATED ORAL
Qty: 60 TABLET | Refills: 0 | Status: SHIPPED | OUTPATIENT
Start: 2017-09-22 | End: 2017-10-25

## 2017-09-22 NOTE — TELEPHONE ENCOUNTER
SUKHJINDER- please call pt, and assist in scheduling re-establish appt w/ new PCP    Message sent to oncall for refill  Hypertensive Medications  Protocol Criteria:  · Appointment scheduled in the past 6 months or in the next 3 months  · BMP or CMP in the past 12

## 2017-09-22 NOTE — TELEPHONE ENCOUNTER
When I tried to sign for it it says pt is allergic to it but apparently she is on it and ok-- pls help her with apt

## 2017-09-27 DIAGNOSIS — I10 UNCONTROLLED HYPERTENSION: ICD-10-CM

## 2017-09-30 RX ORDER — SPIRONOLACTONE 25 MG/1
TABLET ORAL
Qty: 60 TABLET | Refills: 0 | OUTPATIENT
Start: 2017-09-30

## 2017-09-30 NOTE — TELEPHONE ENCOUNTER
Pt's secondary insurance we are not contracted with and pt does not want to make an appt at this time.

## 2017-10-25 RX ORDER — LABETALOL 300 MG/1
TABLET, FILM COATED ORAL
Qty: 60 TABLET | Refills: 0 | Status: SHIPPED | OUTPATIENT
Start: 2017-10-25 | End: 2018-03-16

## 2017-10-25 NOTE — TELEPHONE ENCOUNTER
Hypertensive Medications Protocol Failed    Hypertensive Medications  Protocol Criteria:  · Appointment scheduled in the past 6 months or in the next 3 months  · BMP or CMP in the past 12 months  · Creatinine result < 2  Recent Outpatient Visits

## 2017-11-06 RX ORDER — PANTOPRAZOLE SODIUM 40 MG/1
TABLET, DELAYED RELEASE ORAL
Qty: 90 TABLET | Refills: 0 | Status: SHIPPED | OUTPATIENT
Start: 2017-11-06 | End: 2018-01-15

## 2017-11-06 NOTE — TELEPHONE ENCOUNTER
Please advise on refill request.   Refill Protocol Appointment Criteria  · Appointment scheduled in the past 12 months or in the next 3 months  Recent Outpatient Visits            8 months ago Essential hypertension    [address], [address] [facility] LAITH Amador

## 2017-11-20 DIAGNOSIS — I10 UNCONTROLLED HYPERTENSION: ICD-10-CM

## 2017-11-20 RX ORDER — SPIRONOLACTONE 25 MG/1
TABLET ORAL
Qty: 60 TABLET | Refills: 0 | Status: ON HOLD | OUTPATIENT
Start: 2017-11-20 | End: 2018-06-08

## 2017-11-20 NOTE — TELEPHONE ENCOUNTER
One month refilled. Please inform patient they will need to make an appointment to receive any further refills.

## 2017-11-20 NOTE — TELEPHONE ENCOUNTER
Please advise. No current appointment    Phone Room please call the patient and schedule an appointment. Thanks.       Hypertensive Medications  Protocol Criteria:  · Appointment scheduled in the past 6 months or in the next 3 months  · BMP or CMP in th

## 2017-11-27 RX ORDER — LABETALOL 300 MG/1
TABLET, FILM COATED ORAL
Qty: 60 TABLET | Refills: 0 | OUTPATIENT
Start: 2017-11-27

## 2017-11-28 NOTE — TELEPHONE ENCOUNTER
Noted multiple providers have been refilling her medications-she does need to make an appointment to follow-up/establish care--ty

## 2017-11-28 NOTE — TELEPHONE ENCOUNTER
Tried to contact pt, no answer. Phone rings a few times and then starts beeping. Pt has a secondary plan that we are not contracted with however.

## 2017-12-21 RX ORDER — HYDRALAZINE HYDROCHLORIDE 50 MG/1
TABLET, FILM COATED ORAL
Qty: 30 TABLET | Refills: 0 | Status: ON HOLD | OUTPATIENT
Start: 2017-12-21 | End: 2018-06-01

## 2017-12-21 NOTE — TELEPHONE ENCOUNTER
Call center please assist in scheduling pt for apt. She will not be able to continue getting refills without an apt.

## 2017-12-21 NOTE — TELEPHONE ENCOUNTER
One-month prescribed. She will need to make an appointment as she has been getting medications from multiple doctors without coming in.

## 2017-12-31 DIAGNOSIS — I10 UNCONTROLLED HYPERTENSION: ICD-10-CM

## 2018-01-02 NOTE — TELEPHONE ENCOUNTER
Unable to get through to patient every time I call patient hits disconnect.  ( or phone is off the hook )   If patient call back for medications advise she needs to schedule apt with new PCP

## 2018-01-03 RX ORDER — HYDRALAZINE HYDROCHLORIDE 50 MG/1
TABLET, FILM COATED ORAL
Qty: 30 TABLET | Refills: 0 | OUTPATIENT
Start: 2018-01-03

## 2018-01-03 NOTE — TELEPHONE ENCOUNTER
CSS please assist patient to establish care with new PCP if staying with [facility]

## 2018-01-06 RX ORDER — SPIRONOLACTONE 25 MG/1
TABLET ORAL
Qty: 60 TABLET | Refills: 0 | OUTPATIENT
Start: 2018-01-06

## 2018-01-06 NOTE — TELEPHONE ENCOUNTER
Tried call pt rang twice and busy. No response letter sent. Pharmacy contacted notified have not seen patient in 10 months if pt calls with status update on refill to please ask her to call us. We can refill until appt with new provider.      Hypertensive

## 2018-01-09 RX ORDER — HYDRALAZINE HYDROCHLORIDE 50 MG/1
TABLET, FILM COATED ORAL
Qty: 30 TABLET | Refills: 0 | OUTPATIENT
Start: 2018-01-09

## 2018-01-17 RX ORDER — PANTOPRAZOLE SODIUM 40 MG/1
TABLET, DELAYED RELEASE ORAL
Qty: 90 TABLET | Refills: 0 | Status: SHIPPED | OUTPATIENT
Start: 2018-01-17 | End: 2018-04-13

## 2018-01-17 NOTE — TELEPHONE ENCOUNTER
Refill Protocol Appointment Criteria  · Appointment scheduled in the past 12 months or in the next 3 months  Recent Outpatient Visits            10 months ago Essential hypertension    Dora Pink MD    Off

## 2018-01-29 DIAGNOSIS — I10 UNCONTROLLED HYPERTENSION: ICD-10-CM

## 2018-01-30 RX ORDER — SPIRONOLACTONE 25 MG/1
TABLET ORAL
Qty: 60 TABLET | Refills: 0 | OUTPATIENT
Start: 2018-01-30

## 2018-02-13 NOTE — TELEPHONE ENCOUNTER
Pharm is also requesting Amlodipine     Current Outpatient Prescriptions:  AMLODIPINE BESYLATE 5 MG Oral Tab TAKE 1 TABLET(5 MG) BY MOUTH DAILY Disp: 30 tablet Rfl: 0 No

## 2018-02-23 ENCOUNTER — TELEPHONE (OUTPATIENT)
Dept: INTERNAL MEDICINE CLINIC | Facility: CLINIC | Age: 78
End: 2018-02-23

## 2018-03-05 DIAGNOSIS — I10 UNCONTROLLED HYPERTENSION: ICD-10-CM

## 2018-03-05 RX ORDER — HYDRALAZINE HYDROCHLORIDE 50 MG/1
TABLET, FILM COATED ORAL
Qty: 30 TABLET | Refills: 0 | OUTPATIENT
Start: 2018-03-05

## 2018-03-05 RX ORDER — SPIRONOLACTONE 25 MG/1
TABLET ORAL
Qty: 60 TABLET | Refills: 0 | OUTPATIENT
Start: 2018-03-05

## 2018-03-07 ENCOUNTER — TELEPHONE (OUTPATIENT)
Dept: INTERNAL MEDICINE CLINIC | Facility: CLINIC | Age: 78
End: 2018-03-07

## 2018-03-07 NOTE — TELEPHONE ENCOUNTER
Per note above:  [facility] pharmacy is requesting a refill for lidocaine ointment  with any questions    Attempted to call both numbers to make sure the patient is requesting. We have never ordered.    Both numbers gave me a busy signal.

## 2018-03-08 NOTE — TELEPHONE ENCOUNTER
Left a message with friend who stated she takes her phone off the hook. He will have her call us back.

## 2018-03-09 NOTE — TELEPHONE ENCOUNTER
I cannot get a hold of the patient keeps going to busy signal.  I called [facility] and stated the patient was never prescribed Lidocaine ointment and who is requesting.   Gia from [facility] stated the patient,  I instructed the patient is n

## 2018-03-17 NOTE — TELEPHONE ENCOUNTER
Pending Prescriptions Disp Refills    LABETALOL  MG Oral Tab [Pharmacy Med Name: LABETALOL 300MG TABLETS] 60 tablet 0     Sig: TAKE 1 TABLET(300 MG) BY MOUTH TWICE DAILY           Last Office Visit with PCP: Visit date not found  Last Blood Pressu 03/06/2017   GLOBULIN 2.2 (L) 03/06/2017   AGRATIO 2.1 (H) 09/29/2016   ANIONGAP 10 03/06/2017   OSMOCALC 300 (H) 03/06/2017

## 2018-03-19 RX ORDER — LABETALOL 300 MG/1
TABLET, FILM COATED ORAL
Qty: 60 TABLET | Refills: 0 | Status: ON HOLD | OUTPATIENT
Start: 2018-03-19 | End: 2018-06-01

## 2018-04-15 RX ORDER — PANTOPRAZOLE SODIUM 40 MG/1
TABLET, DELAYED RELEASE ORAL
Qty: 90 TABLET | Refills: 0 | Status: SHIPPED | OUTPATIENT
Start: 2018-04-15 | End: 2018-07-16

## 2018-04-15 NOTE — TELEPHONE ENCOUNTER
Refill Protocol Appointment Criteria  · Appointment scheduled in the past 12 months or in the next 3 months  Recent Outpatient Visits            1 year ago Essential hypertension    Rome Bowen MD    Office

## 2018-04-30 RX ORDER — LABETALOL 300 MG/1
TABLET, FILM COATED ORAL
Qty: 60 TABLET | Refills: 0 | OUTPATIENT
Start: 2018-04-30

## 2018-05-29 ENCOUNTER — NURSE TRIAGE (OUTPATIENT)
Dept: OTHER | Age: 78
End: 2018-05-29

## 2018-05-29 NOTE — TELEPHONE ENCOUNTER
Action Requested: Summary for Provider     []  Critical Lab, Recommendations Needed  [] Need Additional Advice  [x]   FYI    []   Need Orders  [] Need Medications Sent to Pharmacy  []  Other     SUMMARY: Pt called reports she spoke to Dr. Jam Roberts and was t

## 2018-06-01 ENCOUNTER — APPOINTMENT (OUTPATIENT)
Dept: CT IMAGING | Facility: HOSPITAL | Age: 78
DRG: 683 | End: 2018-06-01
Attending: EMERGENCY MEDICINE
Payer: MEDICARE

## 2018-06-01 ENCOUNTER — HOSPITAL ENCOUNTER (INPATIENT)
Facility: HOSPITAL | Age: 78
LOS: 7 days | Discharge: NURSING, REHAB OR LONG-TERM CARE | DRG: 683 | End: 2018-06-08
Attending: EMERGENCY MEDICINE | Admitting: HOSPITALIST
Payer: MEDICARE

## 2018-06-01 ENCOUNTER — APPOINTMENT (OUTPATIENT)
Dept: MRI IMAGING | Facility: HOSPITAL | Age: 78
DRG: 683 | End: 2018-06-01
Attending: HOSPITALIST
Payer: MEDICARE

## 2018-06-01 DIAGNOSIS — M54.31 SCIATICA OF RIGHT SIDE: ICD-10-CM

## 2018-06-01 DIAGNOSIS — I16.9 HYPERTENSIVE CRISIS: Primary | ICD-10-CM

## 2018-06-01 PROBLEM — N18.30 CKD (CHRONIC KIDNEY DISEASE), STAGE III (HCC): Status: ACTIVE | Noted: 2017-03-02

## 2018-06-01 PROCEDURE — 72148 MRI LUMBAR SPINE W/O DYE: CPT | Performed by: HOSPITALIST

## 2018-06-01 PROCEDURE — 99223 1ST HOSP IP/OBS HIGH 75: CPT | Performed by: INTERNAL MEDICINE

## 2018-06-01 PROCEDURE — 99223 1ST HOSP IP/OBS HIGH 75: CPT | Performed by: HOSPITALIST

## 2018-06-01 RX ORDER — LORAZEPAM 2 MG/ML
1 INJECTION INTRAMUSCULAR EVERY 4 HOURS PRN
Status: DISCONTINUED | OUTPATIENT
Start: 2018-06-01 | End: 2018-06-08

## 2018-06-01 RX ORDER — SODIUM CHLORIDE 0.9 % (FLUSH) 0.9 %
3 SYRINGE (ML) INJECTION AS NEEDED
Status: DISCONTINUED | OUTPATIENT
Start: 2018-06-01 | End: 2018-06-08

## 2018-06-01 RX ORDER — SPIRONOLACTONE 25 MG/1
25 TABLET ORAL DAILY
Status: DISCONTINUED | OUTPATIENT
Start: 2018-06-01 | End: 2018-06-01

## 2018-06-01 RX ORDER — LABETALOL HYDROCHLORIDE 5 MG/ML
40 INJECTION, SOLUTION INTRAVENOUS ONCE
Status: DISCONTINUED | OUTPATIENT
Start: 2018-06-01 | End: 2018-06-01

## 2018-06-01 RX ORDER — MORPHINE SULFATE 4 MG/ML
4 INJECTION, SOLUTION INTRAMUSCULAR; INTRAVENOUS EVERY 2 HOUR PRN
Status: DISCONTINUED | OUTPATIENT
Start: 2018-06-01 | End: 2018-06-08

## 2018-06-01 RX ORDER — ACETAMINOPHEN 325 MG/1
650 TABLET ORAL EVERY 6 HOURS PRN
Status: DISCONTINUED | OUTPATIENT
Start: 2018-06-01 | End: 2018-06-08

## 2018-06-01 RX ORDER — BISACODYL 10 MG
10 SUPPOSITORY, RECTAL RECTAL
Status: DISCONTINUED | OUTPATIENT
Start: 2018-06-01 | End: 2018-06-08

## 2018-06-01 RX ORDER — ACETAMINOPHEN 500 MG
500 TABLET ORAL EVERY 6 HOURS PRN
Status: DISCONTINUED | OUTPATIENT
Start: 2018-06-01 | End: 2018-06-08

## 2018-06-01 RX ORDER — PANTOPRAZOLE SODIUM 40 MG/1
40 TABLET, DELAYED RELEASE ORAL
Status: DISCONTINUED | OUTPATIENT
Start: 2018-06-02 | End: 2018-06-08

## 2018-06-01 RX ORDER — MORPHINE SULFATE 4 MG/ML
6 INJECTION, SOLUTION INTRAMUSCULAR; INTRAVENOUS ONCE
Status: COMPLETED | OUTPATIENT
Start: 2018-06-01 | End: 2018-06-01

## 2018-06-01 RX ORDER — HYDRALAZINE HYDROCHLORIDE 20 MG/ML
20 INJECTION INTRAMUSCULAR; INTRAVENOUS EVERY 6 HOURS PRN
Status: DISCONTINUED | OUTPATIENT
Start: 2018-06-01 | End: 2018-06-08

## 2018-06-01 RX ORDER — ASPIRIN 81 MG/1
81 TABLET ORAL DAILY
Status: DISCONTINUED | OUTPATIENT
Start: 2018-06-02 | End: 2018-06-08

## 2018-06-01 RX ORDER — HYDRALAZINE HYDROCHLORIDE 20 MG/ML
20 INJECTION INTRAMUSCULAR; INTRAVENOUS ONCE
Status: COMPLETED | OUTPATIENT
Start: 2018-06-01 | End: 2018-06-01

## 2018-06-01 RX ORDER — SODIUM CHLORIDE 9 MG/ML
INJECTION, SOLUTION INTRAVENOUS
Status: DISPENSED
Start: 2018-06-01 | End: 2018-06-02

## 2018-06-01 RX ORDER — LABETALOL 200 MG/1
200 TABLET, FILM COATED ORAL EVERY 12 HOURS SCHEDULED
Status: DISCONTINUED | OUTPATIENT
Start: 2018-06-01 | End: 2018-06-08

## 2018-06-01 RX ORDER — SODIUM PHOSPHATE, DIBASIC AND SODIUM PHOSPHATE, MONOBASIC 7; 19 G/133ML; G/133ML
1 ENEMA RECTAL ONCE AS NEEDED
Status: DISCONTINUED | OUTPATIENT
Start: 2018-06-01 | End: 2018-06-08

## 2018-06-01 RX ORDER — DIAZEPAM 10 MG/1
20 TABLET ORAL NIGHTLY PRN
Status: DISCONTINUED | OUTPATIENT
Start: 2018-06-01 | End: 2018-06-08

## 2018-06-01 RX ORDER — MORPHINE SULFATE 4 MG/ML
1 INJECTION, SOLUTION INTRAMUSCULAR; INTRAVENOUS EVERY 2 HOUR PRN
Status: DISCONTINUED | OUTPATIENT
Start: 2018-06-01 | End: 2018-06-08

## 2018-06-01 RX ORDER — POLYETHYLENE GLYCOL 3350 17 G/17G
17 POWDER, FOR SOLUTION ORAL DAILY PRN
Status: DISCONTINUED | OUTPATIENT
Start: 2018-06-01 | End: 2018-06-08

## 2018-06-01 RX ORDER — ATORVASTATIN CALCIUM 40 MG/1
40 TABLET, FILM COATED ORAL NIGHTLY
Status: DISCONTINUED | OUTPATIENT
Start: 2018-06-01 | End: 2018-06-08

## 2018-06-01 RX ORDER — SPIRONOLACTONE 25 MG/1
25 TABLET ORAL
Status: DISCONTINUED | OUTPATIENT
Start: 2018-06-02 | End: 2018-06-02

## 2018-06-01 RX ORDER — MORPHINE SULFATE 4 MG/ML
2 INJECTION, SOLUTION INTRAMUSCULAR; INTRAVENOUS EVERY 2 HOUR PRN
Status: DISCONTINUED | OUTPATIENT
Start: 2018-06-01 | End: 2018-06-08

## 2018-06-01 RX ORDER — AMLODIPINE BESYLATE 10 MG/1
10 TABLET ORAL DAILY
Status: DISCONTINUED | OUTPATIENT
Start: 2018-06-01 | End: 2018-06-01

## 2018-06-01 RX ORDER — HYDRALAZINE HYDROCHLORIDE 50 MG/1
50 TABLET, FILM COATED ORAL EVERY 8 HOURS SCHEDULED
Status: DISCONTINUED | OUTPATIENT
Start: 2018-06-01 | End: 2018-06-08

## 2018-06-01 RX ORDER — MORPHINE SULFATE 4 MG/ML
10 INJECTION, SOLUTION INTRAMUSCULAR; INTRAVENOUS ONCE
Status: DISCONTINUED | OUTPATIENT
Start: 2018-06-01 | End: 2018-06-01

## 2018-06-01 RX ORDER — ONDANSETRON 2 MG/ML
4 INJECTION INTRAMUSCULAR; INTRAVENOUS EVERY 6 HOURS PRN
Status: DISCONTINUED | OUTPATIENT
Start: 2018-06-01 | End: 2018-06-08

## 2018-06-01 RX ORDER — DOCUSATE SODIUM 100 MG/1
100 CAPSULE, LIQUID FILLED ORAL 2 TIMES DAILY
Status: DISCONTINUED | OUTPATIENT
Start: 2018-06-01 | End: 2018-06-08

## 2018-06-01 RX ORDER — GABAPENTIN 300 MG/1
300 CAPSULE ORAL NIGHTLY
Status: DISCONTINUED | OUTPATIENT
Start: 2018-06-01 | End: 2018-06-08

## 2018-06-01 RX ORDER — MIRTAZAPINE 15 MG/1
15 TABLET, FILM COATED ORAL NIGHTLY
Status: DISCONTINUED | OUTPATIENT
Start: 2018-06-01 | End: 2018-06-08

## 2018-06-01 NOTE — ED NOTES
Patient reports right leg pain. Hx sciatica. States pain is 10/10. CMS intact.  BP elevated, pt states she \"took her morning medication\" pt is prescribed metoprolol although pill bottle is empty and pill is missing from saturdays space in daily pill conta

## 2018-06-01 NOTE — ED INITIAL ASSESSMENT (HPI)
Via medics from home---c/o 10/10 right sided sciatica pain, chronic issue    . Noted to be hypertensive in route.

## 2018-06-01 NOTE — ED PROVIDER NOTES
Patient Seen in: [facility] Emergency Department     History   Patient presents with:  Sciatica  Hypertension (cardiovascular)    Stated Complaint: right side sciatica and hypertension    HPI    68year old female with a history of severe hypertensi Comment: Procedure: ESOPHAGOGASTRODUODENOSCOPY (EGD);                  Surgeon: Rosario Reyes MD;  Location: [address] [place] [address]                ENDOSCOPY  No date: HYSTERECTOMY  2004: OTHER SURGICAL HISTORY      Comment: Renal Artery Stent  2009: OTHER SURGICAL HISTORY acetaminophen (TYLENOL EXTRA STRENGTH) 500 MG Oral Tab,  Take 500 mg by mouth every 6 (six) hours as needed for Pain.   lamoTRIgine (LAMICTAL) 150 MG Oral Tab,  Take 1 tablet by mouth 2 (two) times daily.    ROPINIRole HCl (REQUIP) 4 MG Oral Tab,  Take 4 HENT:   Head: Normocephalic and atraumatic. Head is without raccoon's eyes, without right periorbital erythema and without left periorbital erythema.    Nose: Nose normal.   Mouth/Throat: Mucous membranes are normal. Mucous membranes are not pale and not Diagnosis: Medical noncompliance leading to uncontrolled hypertension, worsening of her chronic sciatica versus aortic or peripheral vascular injury from uncontrolled hypertension    Limitations of history:   able to obtain history from patient  Factors li patient's pain symptoms resolved after ED treatment. overall clinical presentation including general toxicity and distal perfusion are improved. hemodynamic function is improved.      Diagnostic Considerations and Interpretation of abnormal or signific

## 2018-06-01 NOTE — CONSULTS
[facility] \[facility] [place] [facility]\"" - [facility]    Report of Consultation    Date of Admission:  6/1/2018  Date of Consult:  6/1/2018   Reason for Consultation:     Back pain     History of Present Illness:     Patient is a 55-year-old female with past medical history of hig DDD (degenerative disc disease)    • Depression    • High blood pressure    • High cholesterol    • Hypopotassemia 2012   • Nephrolithiasis    • Osteoarthritis    • Other and unspecified hyperlipidemia    • Peripheral vascular disease (HCC)     stents in l Facility-Administered Medications:  spironolactone (ALDACTONE) tab 25 mg 25 mg Oral Daily       Allergies    Abilify [Aripiprazo*        Comment:drooling  Iodine [Radiology C*    UNKNOWN    Comment:Kidney problems  Silicone                UNKNOWN  Wellbutr non-tender; non distended, bowel sounds normal   Extremities: extremities normal, no edema  Pulses: pedal pulses palpable  Skin: No rashes or lesions  Lymph nodes: Cervical, supraclavicular normal  Neurologic: moving UE   Psychiatric: calm    Results: MD  6/1/2018

## 2018-06-01 NOTE — H&P
[facility]    PATIENT'S NAME: Anibal Fleischer   ATTENDING PHYSICIAN: Giuliana Soto.  David Irving MD   PATIENT ACCOUNT#:   [id number]    LOCATION:  [place] [address]  MEDICAL RECORD #:   [id number]       YOB: 1940  ADMISSION DATE:       06/0 though it is not clear if patient is compliant with all of her medications. ALLERGIES:  Abilify causes side effects. Iodine causes kidney problems. She also mentioned that she is allergic to MRI contrast.  Wellbutrin causes hallucinations.       FAM Hypertensive urgency with evidence of multiorgan disease secondary to high blood pressure. 2.   History of coronary artery disease. 3.   Renal artery stenosis, status post stent in the remote past.    4.   Peripheral arterial disease.    5.   Lumbar ra

## 2018-06-02 ENCOUNTER — APPOINTMENT (OUTPATIENT)
Dept: ULTRASOUND IMAGING | Facility: HOSPITAL | Age: 78
DRG: 683 | End: 2018-06-02
Attending: INTERNAL MEDICINE
Payer: MEDICARE

## 2018-06-02 PROCEDURE — 93975 VASCULAR STUDY: CPT | Performed by: INTERNAL MEDICINE

## 2018-06-02 PROCEDURE — 76770 US EXAM ABDO BACK WALL COMP: CPT | Performed by: INTERNAL MEDICINE

## 2018-06-02 PROCEDURE — 99233 SBSQ HOSP IP/OBS HIGH 50: CPT | Performed by: HOSPITALIST

## 2018-06-02 PROCEDURE — 99233 SBSQ HOSP IP/OBS HIGH 50: CPT | Performed by: INTERNAL MEDICINE

## 2018-06-02 RX ORDER — POTASSIUM CHLORIDE 14.9 MG/ML
20 INJECTION INTRAVENOUS ONCE
Status: DISCONTINUED | OUTPATIENT
Start: 2018-06-02 | End: 2018-06-02

## 2018-06-02 RX ORDER — SPIRONOLACTONE 25 MG/1
25 TABLET ORAL DAILY
Status: DISCONTINUED | OUTPATIENT
Start: 2018-06-03 | End: 2018-06-07

## 2018-06-02 RX ORDER — POTASSIUM CHLORIDE 20 MEQ/1
40 TABLET, EXTENDED RELEASE ORAL EVERY 4 HOURS
Status: DISPENSED | OUTPATIENT
Start: 2018-06-02 | End: 2018-06-02

## 2018-06-02 RX ORDER — SODIUM CHLORIDE 9 MG/ML
INJECTION, SOLUTION INTRAVENOUS CONTINUOUS
Status: DISPENSED | OUTPATIENT
Start: 2018-06-02 | End: 2018-06-02

## 2018-06-02 RX ORDER — TRAMADOL HYDROCHLORIDE 50 MG/1
50 TABLET ORAL EVERY 6 HOURS PRN
Status: DISCONTINUED | OUTPATIENT
Start: 2018-06-02 | End: 2018-06-03

## 2018-06-02 NOTE — RESPIRATORY THERAPY NOTE
ADDISON ASSESSMENT:    Pt does have a previous diagnosis of ADDISON. Pt does not routinely use a CPAP device at home.    CPAP INITIATION:    Pt to be placed on CPAP: no  Pt refused: yes

## 2018-06-02 NOTE — PLAN OF CARE
CARDIOVASCULAR - ADULT    • Maintains optimal cardiac output and hemodynamic stability Progressing        Impaired Functional Mobility    • Achieve highest/safest level of mobility/gait Progressing        MUSCULOSKELETAL - ADULT    • Return mobility to saf

## 2018-06-02 NOTE — PROGRESS NOTES
[facility]    Progress Note      Subjective:     Back pain better with pain meds       Review of Systems:     Constitutional: negative for fatigue, fevers and weight loss  Eyes: negative for irritation, redness and visual disturbance  Ear 6/3/2018] spironolactone (ALDACTONE) tab 25 mg 25 mg Oral Daily   TraMADol HCl (ULTRAM) tab 50 mg 50 mg Oral Q6H PRN   0.9%  NaCl infusion  Intravenous Continuous   hydrALAzine HCl (APRESOLINE) injection 20 mg 20 mg Intravenous Q6H PRN   hydrALAzine HCl (A GFRNAA  43*  31*   CA  9.6  9.3   NA  143  142   K  2.6*  3.1*   CL  102  105   CO2  32  29     No results found for: PTT, INR  No results for input(s): BNP in the last 168 hours.   Recent Labs   Lab  06/01/18   1343  06/02/18   0618   MG  2.0  1.9

## 2018-06-02 NOTE — CHRONIC PAIN
[facility]  Report of Consultation    Rach Moses Patient Status:  Inpatient    1940 MRN [id number]   Location [facility] 3W/SW Attending Shefali Hay MD   Hosp Day # 1 PCP Aileen Queen MD     Date of Admission:  20 date:  CARPAL TUNNEL RELEASE  11/26/2016: COLONOSCOPY N/A      Comment: Procedure: COLONOSCOPY;  Surgeon: Miracle Piña MD;  Location: [facility] ENDOSCOPY  11/26/2016: EGD N/A      Comment: Procedure: ESOPHAGOGASTRODUODENOSCOPY (EGD); morphINE sulfate (PF) 4 MG/ML injection 4 mg, 4 mg, Intravenous, Q2H PRN  •  docusate sodium (COLACE) cap 100 mg, 100 mg, Oral, BID  •  PEG 3350 (MIRALAX) powder packet 17 g, 17 g, Oral, Daily PRN  •  magnesium hydroxide (MILK OF MAGNESIA) 400 MG/5ML suspe and face: negative for hearing loss and sore throat  Respiratory: negative for cough, hemoptysis and wheezing  Cardiovascular: negative for chest pain, exertional dyspnea, lower extremity edema  Gastrointestinal: negative for abdominal pain, diarrhea and n cord, conus, and cauda equina show normal signal and morphology. OTHER:             Negative. LUMBAR DISC LEVELS:  T12-L1: Loss of disc height with broad-based disc bulge slightly asymmetric to the left.   There is a right paracentral findings are brittany L3-4 right mild, L2-3 mild central bulging discs     L1-2 right mild HNP     Bladder cancer (HCC)     Hypercalciuria     Bipolar 1 disorder (HCC)     Non compliance with medical treatment     Balance problem     Impaired activities of daily living     Unco

## 2018-06-02 NOTE — PLAN OF CARE
Achieve highest/safest level of mobility/gait Not Progressing      Return mobility to safest level of function Not Progressing      Patient/Family Long Term Goal Not Progressing      Maintains optimal cardiac output and hemodynamic stability Progressing

## 2018-06-02 NOTE — PROGRESS NOTES
[facility]    Progress Note    Adam Daley Patient Status:  Inpatient    1940 MRN [id number]   Location [facility] 3W/SW Attending Yecenia Rowe MD   Hosp Day # 1 PCP Esther Fuentes MD       Subjective:   Adam Daley is Potassium Chloride ER  40 mEq Oral Q4H   • [START ON 6/3/2018] spironolactone  25 mg Oral Daily   • hydrALAzine HCl  50 mg Oral Q8H [facility]   • Labetalol HCl  200 mg Oral 2 times per day   • docusate sodium  100 mg Oral BID   • aspirin  81 mg Oral Daily   • at

## 2018-06-03 PROCEDURE — 99233 SBSQ HOSP IP/OBS HIGH 50: CPT | Performed by: INTERNAL MEDICINE

## 2018-06-03 PROCEDURE — 99233 SBSQ HOSP IP/OBS HIGH 50: CPT | Performed by: HOSPITALIST

## 2018-06-03 RX ORDER — LIDOCAINE 50 MG/G
1 PATCH TOPICAL EVERY 24 HOURS
Status: DISCONTINUED | OUTPATIENT
Start: 2018-06-03 | End: 2018-06-08

## 2018-06-03 RX ORDER — TRAMADOL HYDROCHLORIDE 50 MG/1
50 TABLET ORAL EVERY 12 HOURS PRN
Status: DISCONTINUED | OUTPATIENT
Start: 2018-06-03 | End: 2018-06-04

## 2018-06-03 NOTE — PROGRESS NOTES
[facility]    Progress Note    Deepak Gaspar Patient Status:  Inpatient    1940 MRN [id number]   Location [facility] 3W/SW Attending Chip Figueroa MD   Hosp Day # 2 PCP Nemesio Tirado MD       Subjective:   Jamesromi Gaspar is Pari Cardoza MD on 6/02/2018 at 7:32               • spironolactone  25 mg Oral Daily   • hydrALAzine HCl  50 mg Oral Q8H [address]   • Labetalol HCl  200 mg Oral 2 times per day   • docusate sodium  100 mg Oral BID   • aspirin  81 mg Oral Daily   • atorvastatin

## 2018-06-03 NOTE — CHRONIC PAIN
[facility]  Anesthesiology Pain Management Progress Note      Patient name: Deepthi Campos 68year old female  : 1940  MRN: [id number]    Diagnosis:  Hypertensive crisis  (primary encounter diagnosis)  Sciatica of right side    Reas Oral, Nightly  •  lamoTRIgine (LAMICTAL) tab 150 mg, 150 mg, Oral, BID  •  mirtazapine (REMERON) tab 15 mg, 15 mg, Oral, Nightly  •  Pantoprazole Sodium (PROTONIX) EC tab 40 mg, 40 mg, Oral, QAM AC  •  LORazepam (ATIVAN) injection 1 mg, 1 mg, Intravenous, Overall no significant change since prior exam  from 12/3/14.    Dictated by (CST): Salvador Torres MD on 6/02/2018 at 7:25     Approved by (CST): Salvador Torres MD on 6/02/2018 at 7:32                Assessment:    CONCLUSION: Disk disease, osteoarthritis, and

## 2018-06-03 NOTE — PROGRESS NOTES
[facility]    Progress Note      Subjective:     Received one dose of morphine for back pain .  Family at bedside       Review of Systems:     Constitutional: negative for fatigue, fevers and weight loss  Eyes: negative for irritation, red mg 20 mg Intravenous Q6H PRN   hydrALAzine HCl (APRESOLINE) tab 50 mg 50 mg Oral Q8H JOEL   Labetalol HCl (NORMODYNE) tab 200 mg 200 mg Oral 2 times per day   Normal Saline Flush 0.9 % injection 3 mL 3 mL Intravenous PRN   acetaminophen (TYLENOL) tab 650 mg 32  29  28     No results found for: PTT, INR  No results for input(s): BNP in the last 168 hours.   Recent Labs   Lab  06/01/18   1343  06/02/18   0618   MG  2.0  1.9       Lab Results  Component Value Date   COLORUR Yellow 06/02/2018   CLARITY Hazy 06/02 creatinine from 2017 was 1.16 mg/dL - CKD presumably secondary to hypertensive atherosclerosis especially in light of uncontrolled hypertension and continuing tobacco use  –Strict I's and O's and daily weight  –Renal ultrasound with doppler pending     Dis

## 2018-06-03 NOTE — PLAN OF CARE
Impaired Functional Mobility    • Achieve highest/safest level of mobility/gait Not Progressing        PAIN - ADULT    • Verbalizes/displays adequate comfort level or patient's stated pain goal Not Progressing          CARDIOVASCULAR - ADULT    • Maintains

## 2018-06-03 NOTE — PROGRESS NOTES
[facility] Note:  Renal Dose Adjustment for Tramadol Jerod Shen    Deepak Gaspar has been prescribed Tramadol (ULTRAM) 50 mg orally every 6 hours as needed for pain. Estimated Creatinine Clearance: 21.2 mL/min (A) (based on SCr of 1.6 mg/dL (H)).     H

## 2018-06-04 PROCEDURE — 99233 SBSQ HOSP IP/OBS HIGH 50: CPT | Performed by: INTERNAL MEDICINE

## 2018-06-04 PROCEDURE — 99233 SBSQ HOSP IP/OBS HIGH 50: CPT | Performed by: HOSPITALIST

## 2018-06-04 RX ORDER — TRAMADOL HYDROCHLORIDE 50 MG/1
50 TABLET ORAL EVERY 8 HOURS
Status: DISCONTINUED | OUTPATIENT
Start: 2018-06-04 | End: 2018-06-08

## 2018-06-04 RX ORDER — SODIUM CHLORIDE 9 MG/ML
INJECTION, SOLUTION INTRAVENOUS
Status: COMPLETED
Start: 2018-06-04 | End: 2018-06-04

## 2018-06-04 RX ORDER — POTASSIUM CHLORIDE 20 MEQ/1
40 TABLET, EXTENDED RELEASE ORAL EVERY 4 HOURS
Status: COMPLETED | OUTPATIENT
Start: 2018-06-04 | End: 2018-06-04

## 2018-06-04 NOTE — CM/SW NOTE
Progression of care - Renal US pending. Patient lives alone w/ part time caregiver. Discussed home health, patient in agreement, no preference for agency, referral to [facility] at [facility] ([id number]), orders entered.    Juan Estrada RN, CTL

## 2018-06-04 NOTE — CHRONIC PAIN
[facility]  Anesthesiology Pain Management Progress Note      Patient name: Zahra Adkins 68year old female  : 1940  MRN: [id number]    Reason for Consult: low back pain     Current hospital day: Hospital Day: 4    Subjective: Maria Bui date: WRIST FRACTURE SURGERY      Comment: left wrist  Family History   Problem Relation Age of Onset   • Cancer Father      Lung   • Lung Disorder Mother      Emphysema   • Cancer Mother      bladder   • Diabetes Mother    • Diabetes Brother    • Cancer O within normal limits by visual exam externally  Neck: supple, trachea midline, no obvious JVD  Neuro/ Musculoskeletal:moves all four extremities to command    Assessment:  66yo female admitted with hypertensive crisis and acute low back pain    Plan:  Sche

## 2018-06-04 NOTE — HOME CARE LIAISON
Met with patient at the bedside. Patient is agreeable to [facility]. Brochure and liaison's business card provided with contact information. All questions addressed and answered.

## 2018-06-04 NOTE — OCCUPATIONAL THERAPY NOTE
OCCUPATIONAL THERAPY EVALUATION - INPATIENT     Room Number: 317/317-A  Evaluation Date: 6/4/2018  Type of Evaluation: Initial  Presenting Problem:  (Hypertensive urgency)    Physician Order: IP Consult to Occupational Therapy  Reason for Therapy: ADL/IADL training;Equipment eval/education       OCCUPATIONAL THERAPY MEDICAL/SOCIAL HISTORY     Problem List  Principal Problem:    Hypertensive crisis  Active Problems:    Sciatica of right side      Past Medical History  Past Medical History:   Diagnosis Date with self-care and states she uses a RW. She states she has a caregiver twice a week for 4 weeks.     SUBJECTIVE  \"It started in the back and went to the thigh\"     OCCUPATIONAL THERAPY EXAMINATION      OBJECTIVE     Fall Risk: Standard fall risk    PAIN aware of session/findings; All patient questions and concerns addressed    OT Goals  Patients self stated goal is: to d/c home     Patient will complete functional transfer with mod I using RW  Comment:     Patient will complete LE dressing with AE prn and

## 2018-06-04 NOTE — PROGRESS NOTES
[facility]    Progress Note      Subjective:     Received one dose of morphine for back pain .  Family at bedside       Review of Systems:     Constitutional: negative for fatigue, fevers and weight loss  Eyes: negative for irritation, red patch Transdermal Q24H   spironolactone (ALDACTONE) tab 25 mg 25 mg Oral Daily   hydrALAzine HCl (APRESOLINE) injection 20 mg 20 mg Intravenous Q6H PRN   hydrALAzine HCl (APRESOLINE) tab 50 mg 50 mg Oral Q8H JOEL   Labetalol HCl (NORMODYNE) tab 200 mg 200 m 36*  43*   GFRNAA  31*  31*  38*   CA  9.3  9.4  9.4   NA  142  138  138   K  3.1*  3.9  3.9  3.6   CL  105  105  105   CO2  29  28  30     No results found for: PTT, INR  No results for input(s): BNP in the last 168 hours.   Recent Labs   Lab  06/01/18   1

## 2018-06-04 NOTE — PLAN OF CARE
Problem: Patient Centered Care  Goal: Patient preferences are identified and integrated in the patient's plan of care  Interventions:  - What would you like us to know as we care for you? Uses a walker due to sciatica, for 1 year.   - Provide timely, comple assistive devices  - Assist with transfers and ambulation using safe patient handling equipment as needed  - Ensure adequate protection for wounds/incisions during mobilization  - Obtain PT/OT consults as needed  - Advance activity as appropriate  - Commun

## 2018-06-04 NOTE — PROGRESS NOTES
[facility]    Progress Note    Clementine Ching Patient Status:  Inpatient    1940 MRN [id number]   Location [facility] 3W/SW Attending Abiola Lou MD   Hosp Day # 3 PCP Brodie Flood MD       Subjective:   Clementine Ching is MD on 6/02/2018 at 7:32               • Potassium Chloride ER  40 mEq Oral Q4H   • TraMADol HCl  50 mg Oral Q8H   • lidocaine  1 patch Transdermal Q24H   • spironolactone  25 mg Oral Daily   • hydrALAzine HCl  50 mg Oral Q8H [address]   • Labetalol HCl  200 mg O

## 2018-06-05 PROCEDURE — 99233 SBSQ HOSP IP/OBS HIGH 50: CPT | Performed by: HOSPITALIST

## 2018-06-05 PROCEDURE — 99233 SBSQ HOSP IP/OBS HIGH 50: CPT | Performed by: INTERNAL MEDICINE

## 2018-06-05 NOTE — PROGRESS NOTES
[facility]    Progress Note      Subjective:     Received one dose of morphine for back pain .  Family at bedside     Review of Systems:     Constitutional: negative for fatigue, fevers and weight loss  Eyes: negative for irritation, redne (LIDODERM) 5 % 1 patch 1 patch Transdermal Q24H   spironolactone (ALDACTONE) tab 25 mg 25 mg Oral Daily   hydrALAzine HCl (APRESOLINE) injection 20 mg 20 mg Intravenous Q6H PRN   hydrALAzine HCl (APRESOLINE) tab 50 mg 50 mg Oral Q8H JOEL   Labetalol HCl (NO 22*   CREATSERUM  1.60*  1.36   --   1.36   GFRAA  36*  43*   --   43*   GFRNAA  31*  38*   --   38*   CA  9.4  9.4   --   9.5   NA  138  138   --   136   K  3.9  3.9  3.6  4.3  4.8   CL  105  105   --   105   CO2  28  30   --   22     No results found for MD  6/5/2018

## 2018-06-05 NOTE — PHYSICAL THERAPY NOTE
PHYSICAL THERAPY EVALUATION - INPATIENT     Room Number: 317/317-A  Evaluation Date: 6/5/2018  Type of Evaluation: Initial   Physician Order: PT Eval and Treat    Presenting Problem: hypertensive crisis; sciatic pain  Reason for Therapy: Mobility Dysfunct Fair  Frequency (Obs): 5x/week       PHYSICAL THERAPY MEDICAL/SOCIAL HISTORY     History related to current admission: Per H&P: \"The patient is a 15-year-old  female who came into the emergency department for evaluation of lumbar pain radiating d SURGICAL HISTORY      Comment: Local resection - bladder  2013: OTHER SURGICAL HISTORY      Comment: Removal of kidney stone and stent placement  No date: WRIST FRACTURE SURGERY      Comment: left wrist    HOME SITUATION  Type of Home: Condo   Home Layout: 39.45   CMS Modifier (G-Code): CK    FUNCTIONAL ABILITY STATUS  Gait Assessment   Gait Assistance: Other (Comment) (CGA)  Distance (ft): 15  Assistive Device: Rolling walker  Pattern: Shuffle;R Foot flat;L Foot flat (narrow WILFRED, decreased diamond)  Stoop/C

## 2018-06-05 NOTE — PROGRESS NOTES
[facility]    Progress Note    Benito Patton Patient Status:  Inpatient    1940 MRN [id number]   Location [facility] 3W/SW Attending Ki Azevedo MD   Hosp Day # 4 PCP Aguilar Lerma MD       Subjective:   Benito Patton is 300 mg Oral Nightly   • lamoTRIgine  150 mg Oral BID   • mirtazapine  15 mg Oral Nightly   • Pantoprazole Sodium  40 mg Oral QAM AC         Assessment and Plan:     Hypertensive crisis  Resolving  Renal on board  Concern for hyperaldosteronism    CT negat

## 2018-06-05 NOTE — PROGRESS NOTES
[facility]  Progress Note      Raul Rosen Patient Status:  Inpatient    1940 MRN [id number]   Location [facility] 3W/SW Attending Jose Francisco Ulloa MD   Hosp Day # 4 PCP Ryan Kapoor MD       Subjective:       Objective:

## 2018-06-05 NOTE — OCCUPATIONAL THERAPY NOTE
OCCUPATIONAL THERAPY TREATMENT NOTE - INPATIENT        Room Number: 076/384-S           Presenting Problem:  (Hypertensive urgency)    Problem List  Principal Problem:    Hypertensive crisis  Active Problems:    Sciatica of right side    ASSESSMENT   RN cl reorientation;Patient/Family education;Patient/Family training    SUBJECTIVE  \"My thigh and back always hurt. \"    OBJECTIVE       WEIGHT BEARING RESTRICTION  Weight Bearing Restriction: None                PAIN ASSESSMENT  Ratin  Location: R thigh  M spine precautions   Comment: 2/3 recalled w/ increased time              Goals  on:   Frequency: 3x a week

## 2018-06-06 ENCOUNTER — APPOINTMENT (OUTPATIENT)
Dept: GENERAL RADIOLOGY | Facility: HOSPITAL | Age: 78
DRG: 683 | End: 2018-06-06
Attending: ANESTHESIOLOGY
Payer: MEDICARE

## 2018-06-06 PROCEDURE — 76001 XR C-ARM FLUORO >1 HOUR  (CPT=76001): CPT | Performed by: ANESTHESIOLOGY

## 2018-06-06 PROCEDURE — 99232 SBSQ HOSP IP/OBS MODERATE 35: CPT | Performed by: INTERNAL MEDICINE

## 2018-06-06 PROCEDURE — 3E0S33Z INTRODUCTION OF ANTI-INFLAMMATORY INTO EPIDURAL SPACE, PERCUTANEOUS APPROACH: ICD-10-PCS | Performed by: ANESTHESIOLOGY

## 2018-06-06 PROCEDURE — 99233 SBSQ HOSP IP/OBS HIGH 50: CPT | Performed by: HOSPITALIST

## 2018-06-06 PROCEDURE — 3E0S3BZ INTRODUCTION OF ANESTHETIC AGENT INTO EPIDURAL SPACE, PERCUTANEOUS APPROACH: ICD-10-PCS | Performed by: ANESTHESIOLOGY

## 2018-06-06 RX ORDER — CYCLOBENZAPRINE HCL 5 MG
5 TABLET ORAL 3 TIMES DAILY PRN
Status: DISCONTINUED | OUTPATIENT
Start: 2018-06-06 | End: 2018-06-08

## 2018-06-06 RX ORDER — AMLODIPINE BESYLATE 5 MG/1
5 TABLET ORAL DAILY
Status: DISCONTINUED | OUTPATIENT
Start: 2018-06-06 | End: 2018-06-07

## 2018-06-06 RX ORDER — METHYLPREDNISOLONE ACETATE 80 MG/ML
INJECTION, SUSPENSION INTRA-ARTICULAR; INTRALESIONAL; INTRAMUSCULAR; SOFT TISSUE AS NEEDED
Status: DISCONTINUED | OUTPATIENT
Start: 2018-06-06 | End: 2018-06-06 | Stop reason: HOSPADM

## 2018-06-06 RX ORDER — LIDOCAINE HYDROCHLORIDE 10 MG/ML
INJECTION, SOLUTION EPIDURAL; INFILTRATION; INTRACAUDAL; PERINEURAL AS NEEDED
Status: DISCONTINUED | OUTPATIENT
Start: 2018-06-06 | End: 2018-06-06 | Stop reason: HOSPADM

## 2018-06-06 NOTE — PROGRESS NOTES
[facility]OSP - [facility]    Progress Note    Barbara Aleman Patient Status:  Inpatient    1940 MRN [id number]   Location [facility] 3W/SW Attending Sea Santo MD   Hosp Day # 5 PCP Elaina Mckeon MD       Subjective:   Barbara Aleman i Transdermal Q24H   • spironolactone  25 mg Oral Daily   • hydrALAzine HCl  50 mg Oral Q8H [address]   • Labetalol HCl  200 mg Oral 2 times per day   • docusate sodium  100 mg Oral BID   • aspirin  81 mg Oral Daily   • atorvastatin  40 mg Oral Nightly   • gabapen crisis  Labile. Renal on board  Concern for hyperaldosteronism   CT negative for dissection   Also has history of renal artery stenosis  Continue spironolactone 25 mg daily   Hydralazine 50 mg q 8 hours   Labetalol 200 mg BID   Ok for sBP to be 140-160.

## 2018-06-06 NOTE — CM/SW NOTE
6/6CM-The Patient's MD informed this Writer that the Patient pending PT might require to go to a SNF and requested this Writer to see the Patient about SNFs. This Writer met with the Patient at bedside in regards to going to a SNF.  The Patient stated that

## 2018-06-06 NOTE — OCCUPATIONAL THERAPY NOTE
OCCUPATIONAL THERAPY TREATMENT NOTE - INPATIENT        Room Number: 504/986-U           Presenting Problem:  (Hypertensive urgency)    Problem List  Principal Problem:    Hypertensive crisis  Active Problems:    Sciatica of right side      ASSESSMENT   Pt regular lower body clothing?: A Lot  -   Bathing (including washing, rinsing, drying)?: A Lot  -   Toileting, which includes using toilet, bedpan or urinal? : A Little  -   Putting on and taking off regular upper body clothing?: A Little  -   Taking care o

## 2018-06-06 NOTE — PROCEDURES
Patient was taken to operating room #18. Prone position. Sterile preparation and draping of lumbar spine. Lidocaine 1% 3 cc locally to T12-L1 interspace after it was identified with fluoroscopy.   24-gauge spinal stylette that pencil point needle was belén

## 2018-06-07 PROCEDURE — 99233 SBSQ HOSP IP/OBS HIGH 50: CPT | Performed by: HOSPITALIST

## 2018-06-07 PROCEDURE — 99233 SBSQ HOSP IP/OBS HIGH 50: CPT | Performed by: INTERNAL MEDICINE

## 2018-06-07 RX ORDER — SPIRONOLACTONE 50 MG/1
50 TABLET, FILM COATED ORAL DAILY
Status: DISCONTINUED | OUTPATIENT
Start: 2018-06-08 | End: 2018-06-08

## 2018-06-07 RX ORDER — AMLODIPINE BESYLATE 5 MG/1
5 TABLET ORAL DAILY
Status: DISCONTINUED | OUTPATIENT
Start: 2018-06-07 | End: 2018-06-08

## 2018-06-07 RX ORDER — HYDROCODONE BITARTRATE AND ACETAMINOPHEN 5; 325 MG/1; MG/1
1 TABLET ORAL EVERY 6 HOURS PRN
Status: DISCONTINUED | OUTPATIENT
Start: 2018-06-07 | End: 2018-06-08

## 2018-06-07 RX ORDER — SPIRONOLACTONE 25 MG/1
25 TABLET ORAL ONCE
Status: COMPLETED | OUTPATIENT
Start: 2018-06-07 | End: 2018-06-07

## 2018-06-07 NOTE — PLAN OF CARE
Problem: Patient Centered Care  Goal: Patient preferences are identified and integrated in the patient's plan of care  Interventions:  - What would you like us to know as we care for you? Uses a walker due to sciatica, for 1 year.   - Provide timely, comple ambulating w/ or w/o assistive devices  - Assist with transfers and ambulation using safe patient handling equipment as needed  - Ensure adequate protection for wounds/incisions during mobilization  - Obtain PT/OT consults as needed  - Advance activity as

## 2018-06-07 NOTE — CM/SW NOTE
6/7/18 Cm Discharge planning   Pt has agreed to short term rehab at [facility]. Referral and medical records [facility]ed [facility], requested NH DON from [facility].   CM will continue to follow and arrange rehab transfer when medically

## 2018-06-07 NOTE — PHYSICAL THERAPY NOTE
Attempted to see pt for PT session, as pt was cleared by RN. Discussed with pt the importance of proper body mechanics and positioning, as upon entering pt's room, pt was in a slouched, crooked position and with R knee flexed.  Discussed symptom management

## 2018-06-07 NOTE — PROGRESS NOTES
[facility]    Progress Note    Rach Moses Patient Status:  Inpatient    1940 MRN [id number]   Location [facility] 3W/SW Attending Marietta Mascorro MD   Hosp Day # 6 PCP Aileen Queen MD     Subjective:   Subjective: pt is transforaminal epidural injection 6/6/18.   - cont tramadol, lidoderm patch for pain control.   - Add flexeril low dose PRN for spasms.   - PT/OT eval- unable to work with patient d/t blood pressure and pain  - add norco prn, d/w Suzy pain apn        Quali

## 2018-06-07 NOTE — PROGRESS NOTES
[facility]  Inpatient Pain Management Progress Note      Patient name: Rashi Sampson 68year old female  : 1940  MRN: [id number]    Diagnosis: Hypertensive crisis  (primary encounter diagnosis)  Sciatica of right side    Reason for Stent  2009: OTHER SURGICAL HISTORY      Comment: Local resection - bladder  2013: OTHER SURGICAL HISTORY      Comment: Removal of kidney stone and stent placement  No date: WRIST FRACTURE SURGERY      Comment: left wrist  Family History   Problem Relation appropriately   Head: normocephalic, atraumatic  Eyes: anicteric; no injection  Ears: no obvious deformities noted   Nose: externally grossly within normal limits, no unusual discharge or rhinorrhea   Throat: lips grossly within normal limits by visual exa

## 2018-06-07 NOTE — PROGRESS NOTES
[facility]    [facility] Note    Deepak Gaspar Patient Status:  Inpatient    1940 MRN [id number]   Location [facility] 3W/SW Attending Ana Alvarez MD   Hosp Day # 5 PCP Nemesio Tirado MD       Subjective:   Deepak Gaspar i present, no abnormal bruising noted  Back/Spine: no abnormalities noted  Musculoskeletal: full ROM all extremities good strength  no deformities  Extremities: no edema, cyanosis  Neurological:  Grossly normal    Results:     Laboratory Data:    Lab Results

## 2018-06-07 NOTE — PROGRESS NOTES
[facility]    Progress Note      Subjective:     State pain better.      Review of Systems:     Constitutional: negative for fatigue, fevers and weight loss  Eyes: negative for irritation, redness and visual disturbance  Respiratory: negat Transdermal Q24H   spironolactone (ALDACTONE) tab 25 mg 25 mg Oral Daily   hydrALAzine HCl (APRESOLINE) injection 20 mg 20 mg Intravenous Q6H PRN   hydrALAzine HCl (APRESOLINE) tab 50 mg 50 mg Oral Q8H JOEL   Labetalol HCl (NORMODYNE) tab 200 mg 200 mg Oral 1. 36  1.36   GFRAA  43*   --   43*  43*   GFRNAA  38*   --   38*  38*   CA  9.4   --   9.5  9.9   NA  138   --   136  138   K  3.6  4.3  4.8  4.2   CL  105   --   105  108   CO2  30   --   22  25     No results found for: PTT, INR  No results for input(s):

## 2018-06-08 VITALS
TEMPERATURE: 98 F | HEART RATE: 54 BPM | SYSTOLIC BLOOD PRESSURE: 166 MMHG | HEIGHT: 60 IN | BODY MASS INDEX: 28.93 KG/M2 | RESPIRATION RATE: 18 BRPM | DIASTOLIC BLOOD PRESSURE: 69 MMHG | OXYGEN SATURATION: 94 % | WEIGHT: 147.38 LBS

## 2018-06-08 PROBLEM — N18.30 CHRONIC KIDNEY DISEASE (CKD), STAGE III (MODERATE) (HCC): Status: ACTIVE | Noted: 2017-03-02

## 2018-06-08 PROCEDURE — 99233 SBSQ HOSP IP/OBS HIGH 50: CPT | Performed by: INTERNAL MEDICINE

## 2018-06-08 PROCEDURE — 99239 HOSP IP/OBS DSCHRG MGMT >30: CPT | Performed by: HOSPITALIST

## 2018-06-08 RX ORDER — LABETALOL 200 MG/1
200 TABLET, FILM COATED ORAL EVERY 12 HOURS SCHEDULED
Refills: 0 | Status: SHIPPED | COMMUNITY
Start: 2018-06-08 | End: 2018-11-02

## 2018-06-08 RX ORDER — AMLODIPINE BESYLATE 10 MG/1
10 TABLET ORAL DAILY
Refills: 0 | Status: SHIPPED | COMMUNITY
Start: 2018-06-08 | End: 2019-08-21

## 2018-06-08 RX ORDER — HYDROCODONE BITARTRATE AND ACETAMINOPHEN 5; 325 MG/1; MG/1
1 TABLET ORAL EVERY 6 HOURS PRN
Qty: 30 TABLET | Refills: 0 | Status: SHIPPED | OUTPATIENT
Start: 2018-06-08 | End: 2018-11-02

## 2018-06-08 RX ORDER — DIAZEPAM 10 MG/1
20 TABLET ORAL NIGHTLY PRN
Qty: 30 TABLET | Refills: 0 | Status: SHIPPED | OUTPATIENT
Start: 2018-06-08 | End: 2018-12-14

## 2018-06-08 RX ORDER — LIDOCAINE 50 MG/G
1 PATCH TOPICAL EVERY 24 HOURS
Refills: 0 | Status: SHIPPED | COMMUNITY
Start: 2018-06-08 | End: 2018-12-14

## 2018-06-08 RX ORDER — CYCLOBENZAPRINE HCL 5 MG
5 TABLET ORAL 3 TIMES DAILY PRN
Refills: 0 | Status: SHIPPED | COMMUNITY
Start: 2018-06-08 | End: 2018-12-14

## 2018-06-08 RX ORDER — SPIRONOLACTONE 50 MG/1
50 TABLET, FILM COATED ORAL DAILY
Refills: 0 | Status: SHIPPED | COMMUNITY
Start: 2018-06-09 | End: 2018-11-02

## 2018-06-08 RX ORDER — AMLODIPINE BESYLATE 10 MG/1
10 TABLET ORAL DAILY
Status: DISCONTINUED | OUTPATIENT
Start: 2018-06-08 | End: 2018-06-08

## 2018-06-08 RX ORDER — TRAMADOL HYDROCHLORIDE 50 MG/1
50 TABLET ORAL EVERY 8 HOURS
Qty: 30 TABLET | Refills: 0 | Status: SHIPPED | OUTPATIENT
Start: 2018-06-08 | End: 2018-12-14

## 2018-06-08 RX ORDER — AMLODIPINE BESYLATE 5 MG/1
5 TABLET ORAL ONCE
Status: COMPLETED | OUTPATIENT
Start: 2018-06-08 | End: 2018-06-08

## 2018-06-08 NOTE — PROGRESS NOTES
[facility]    Progress Note      Subjective:     State pain better.      Review of Systems:     Constitutional: negative for fatigue, fevers and weight loss  Eyes: negative for irritation, redness and visual disturbance  Respiratory: negat (FLEXERIL) tab 5 mg 5 mg Oral TID PRN   TraMADol HCl (ULTRAM) tab 50 mg 50 mg Oral Q8H   lidocaine (LIDODERM) 5 % 1 patch 1 patch Transdermal Q24H   hydrALAzine HCl (APRESOLINE) injection 20 mg 20 mg Intravenous Q6H PRN   hydrALAzine HCl (APRESOLINE) tab 5 --   128*  150*   BUN  21*   --   22*  26*   CREATSERUM  1.36   --   1.36  1.36   GFRAA  43*   --   43*  43*   GFRNAA  38*   --   38*  38*   CA  9.4   --   9.5  9.9   NA  138   --   136  138   K  3.6  4.3  4.8  4.2   CL  105   --   105  108   CO2  30   -

## 2018-06-08 NOTE — CM/SW NOTE
6/8/18 CM Discharge planning / MDO transfer to rehab   Updated medical records and Current PT note faxed to [facility]. Spoke with Evelyn, adm at [facility] bed available today at 4:30 pm, RN report [phone], transport arranged via [facility].    Pt aware a

## 2018-06-08 NOTE — PLAN OF CARE
CARDIOVASCULAR - ADULT    • Maintains optimal cardiac output and hemodynamic stability Completed        Impaired Functional Mobility    • Achieve highest/safest level of mobility/gait Completed        MUSCULOSKELETAL - ADULT    • Return mobility to safest

## 2018-06-08 NOTE — PROGRESS NOTES
[facility]  Inpatient Pain Management Progress Note      Patient name: Lena Hutchins 68year old female  : 1940  MRN: [id number]    Diagnosis: Hypertensive crisis  (primary encounter diagnosis)  Sciatica of right side    Reason for Disorder Mother      Emphysema   • Cancer Mother      bladder   • Diabetes Mother    • Diabetes Brother    • Cancer Other      Pancreatic      reports that she has been smoking Cigarettes. She has a 50.00 pack-year smoking history.  She has never used smok Musculoskeletal:  Tenderness to palpation in medline    Assessment:  68yo female admitted with hypertensive crisis and intractable low back and right leg radicular pain from bulging disks L1 to and L3-4. Plan:  We reviewed these findings with the patie

## 2018-06-08 NOTE — PHYSICAL THERAPY NOTE
PHYSICAL THERAPY TREATMENT NOTE - INPATIENT     Room Number: 406/490-M       Presenting Problem: hypertensive crisis; sciatic pain    Problem List  Principal Problem:    Hypertensive crisis  Active Problems:    Sciatica of right side      ASSESSMENT   JOSE AVILA bedclothes, sheets and blankets)?: A Little   -   Sitting down on and standing up from a chair with arms (e.g., wheelchair, bedside commode, etc.): A Little   -   Moving from lying on back to sitting on the side of the bed?: A Little   How much help from a during transition   Goal #3 Patient is able to ambulate 150 feet with assist device: walker - rolling at assistance level: modified independent   Goal #3   Current Status Amb 50'ftx2 w/ RW & min A, signif UE use, cues for breathing   Goal #4 Patient to [facility]

## 2018-06-08 NOTE — CM/SW NOTE
Progression of care - S/P epidural injection, being followed by pain service. Also monitoring blood pressure and KOBY/CKD. Plan is [place] [address] when medically stable.    Juan Estrada RN, CTL

## 2018-06-08 NOTE — PLAN OF CARE
[facility] [id number] [facility] [address]  Report given to [facility]

## 2018-06-08 NOTE — OCCUPATIONAL THERAPY NOTE
OCCUPATIONAL THERAPY TREATMENT NOTE - INPATIENT        Room Number: 046/331-I           Presenting Problem:  (Hypertensive urgency)    Problem List  Principal Problem:    Hypertensive crisis  Active Problems:    Sciatica of right side      ASSESSMENT   Ms. Score:   Score: 18  Approx Degree of Impairment: 46.65%  Standardized Score (AM-PAC Scale): 38.66  CMS Modifier (G-Code): CK    FUNCTIONAL TRANSFER ASSESSMENT  Supine to Sit : Minimum assistance  Sit to Stand: Not tested  Toilet Transfer: Not tested  [facility]

## 2018-06-08 NOTE — DISCHARGE SUMMARY
[place] [facility]    Discharge Summary    Deepthi Campos Patient Status:  Inpatient    1940 MRN [id number]   Location [facility] 3W/SW Attending Tootie Foote MD   Hosp Day # 7 PCP Edvin Hamilton MD     Date of Admission: 2018 present  Neuro: No acute focal deficits  MSK: Full range of motion in extremities  Skin: Warm and dry  Psych: Normal affect  Ext: no c/c/e    History of Present Illness:  The patient is a 19-year-old  female who came into the emergency department f NORCO      Take 1 tablet by mouth every 6 (six) hours as needed. Quantity:  30 tablet  Refills:  0     lidocaine 5 % Ptch  Commonly known as:  LIDODERM      Place 1 patch onto the skin daily.    Refills:  0     TraMADol HCl 50 MG Tabs  Commonly known as: MG Tabs  Commonly known as:  VALIUM      Take 2 tablets (20 mg total) by mouth nightly as needed for Anxiety. Quantity:  30 tablet  Refills:  0     gabapentin 300 MG Caps  Commonly known as:  NEURONTIN      Take 1 capsule (300 mg total) by mouth nightly.

## 2018-06-09 ENCOUNTER — HOSPITAL ENCOUNTER (EMERGENCY)
Facility: HOSPITAL | Age: 78
Discharge: HOME | End: 2018-06-09
Attending: EMERGENCY MEDICINE
Payer: MEDICARE

## 2018-06-09 VITALS
DIASTOLIC BLOOD PRESSURE: 59 MMHG | SYSTOLIC BLOOD PRESSURE: 166 MMHG | OXYGEN SATURATION: 92 % | HEART RATE: 57 BPM | HEIGHT: 60 IN | TEMPERATURE: 99 F | RESPIRATION RATE: 14 BRPM

## 2018-06-09 DIAGNOSIS — Z13.9 ENCOUNTER FOR MEDICAL SCREENING EXAMINATION: Primary | ICD-10-CM

## 2018-06-09 PROCEDURE — 99284 EMERGENCY DEPT VISIT MOD MDM: CPT

## 2018-06-09 NOTE — ED NOTES
Pt's sister is at bedside. Sister states pt opened the car door to get out while the car was moving. Pt is AOx4, is calm and cooperative. Pt denies having done that. Pt states the car was stopped. Pt denies feeling suicidal at this time.

## 2018-06-09 NOTE — CM/SW NOTE
Received call from [facility] for request on transportation; This writer Approved cab voucher as pt's sister left pt here and took pt's house keys w/wallet, per patient her neighbor has extra set of house keys and will be able to get into her home. Zoila Santiago
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
## 2018-06-15 DIAGNOSIS — I10 UNCONTROLLED HYPERTENSION: ICD-10-CM

## 2018-06-16 RX ORDER — LABETALOL 300 MG/1
TABLET, FILM COATED ORAL
Qty: 60 TABLET | Refills: 0 | OUTPATIENT
Start: 2018-06-16

## 2018-06-16 RX ORDER — HYDRALAZINE HYDROCHLORIDE 50 MG/1
TABLET, FILM COATED ORAL
Qty: 30 TABLET | Refills: 0 | OUTPATIENT
Start: 2018-06-16

## 2018-06-16 RX ORDER — SPIRONOLACTONE 25 MG/1
TABLET ORAL
Qty: 60 TABLET | Refills: 0 | OUTPATIENT
Start: 2018-06-16

## 2018-06-20 ENCOUNTER — TELEPHONE (OUTPATIENT)
Dept: INTERNAL MEDICINE CLINIC | Facility: CLINIC | Age: 78
End: 2018-06-20

## 2018-06-20 NOTE — TELEPHONE ENCOUNTER
Kade Deshpande from [facility] there was a delay in start of care, will see pt today. Order is being faxed to office.

## 2018-06-21 NOTE — TELEPHONE ENCOUNTER
Bernadette Arellano is calling pt was Discharged two days ago  Pt was sent to the hospital per Triage  [facility] EMERGENCY [facility] stts pt need her meds to be refilled   Pt is OUT OF ALL MEDS     PT CAN BE SEEN [facility] TOMORROW IF PCP CAN SQUEEZE her in for an APPT/AFTERNOON     RX Labetalol H

## 2018-06-23 RX ORDER — ATORVASTATIN CALCIUM 40 MG/1
TABLET, FILM COATED ORAL
Qty: 30 TABLET | Refills: 0 | Status: SHIPPED | OUTPATIENT
Start: 2018-06-23 | End: 2018-07-29

## 2018-06-23 RX ORDER — LABETALOL 300 MG/1
300 TABLET, FILM COATED ORAL 2 TIMES DAILY
Qty: 60 TABLET | Refills: 0 | Status: SHIPPED | OUTPATIENT
Start: 2018-06-23 | End: 2018-07-29

## 2018-06-23 RX ORDER — SPIRONOLACTONE 50 MG/1
50 TABLET, FILM COATED ORAL DAILY
Qty: 30 TABLET | Refills: 0 | Status: SHIPPED | OUTPATIENT
Start: 2018-06-23 | End: 2018-07-29

## 2018-06-23 RX ORDER — HYDRALAZINE HYDROCHLORIDE 50 MG/1
50 TABLET, FILM COATED ORAL 3 TIMES DAILY
Qty: 90 TABLET | Refills: 0 | Status: SHIPPED | OUTPATIENT
Start: 2018-06-23 | End: 2018-07-29

## 2018-06-23 RX ORDER — LAMOTRIGINE 150 MG/1
150 TABLET ORAL 2 TIMES DAILY
Qty: 60 TABLET | Refills: 0 | Status: SHIPPED | OUTPATIENT
Start: 2018-06-23 | End: 2018-12-14

## 2018-06-23 NOTE — TELEPHONE ENCOUNTER
All meds pended except for Metoprolol, request not specified if Metoprolol Succinate or Metoprolol Tartrate and med not listed it pt med profile. Also noted all other meds except for Atorvastatin listed as pt reported.

## 2018-06-23 NOTE — TELEPHONE ENCOUNTER
Home health RN called to follow up on refill request below, not sure what meds pt needs so pt placed on phone. Pt asking for partial refill if possible, BP today 165/90. Pt last seen by Dr. Roxy Catalan.   Please advise    Hypertensive Medications  Protocol

## 2018-06-29 ENCOUNTER — TELEPHONE (OUTPATIENT)
Dept: INTERNAL MEDICINE CLINIC | Facility: CLINIC | Age: 78
End: 2018-06-29

## 2018-06-29 NOTE — TELEPHONE ENCOUNTER
[facility] calling to advise Dr. Khanh Mendez has not been able to contact the Pt since 6/23

## 2018-07-17 NOTE — TELEPHONE ENCOUNTER
CSS please assist patient in establishing care with new PCP      Refill Protocol Appointment Criteria  · Appointment scheduled in the past 12 months or in the next 3 months  Recent Outpatient Visits            1 year ago Essential hypertension    Kayleigh

## 2018-07-18 ENCOUNTER — TELEPHONE (OUTPATIENT)
Dept: INTERNAL MEDICINE CLINIC | Facility: CLINIC | Age: 78
End: 2018-07-18

## 2018-07-18 NOTE — TELEPHONE ENCOUNTER
Meryle Murders RN/[facility] called in to make sure Dr. Juan A Torres is aware that Pt declined [facility] services and they will be sending discharge orders for doctor. She says that if Dr. Juan A Torres has any questions to contact her. Please be aware.

## 2018-07-22 RX ORDER — PANTOPRAZOLE SODIUM 40 MG/1
TABLET, DELAYED RELEASE ORAL
Qty: 30 TABLET | Refills: 0 | Status: SHIPPED | OUTPATIENT
Start: 2018-07-22 | End: 2018-09-19

## 2018-07-23 ENCOUNTER — TELEPHONE (OUTPATIENT)
Dept: INTERNAL MEDICINE CLINIC | Facility: CLINIC | Age: 78
End: 2018-07-23

## 2018-07-23 NOTE — TELEPHONE ENCOUNTER
Current Outpatient Prescriptions:   •  PANTOPRAZOLE SODIUM 40 MG Oral Tab EC, TAKE 1 TABLET BY MOUTH ONCE DAILY BEFORE BREAKFAST, Disp: 30 tablet, Rfl: 0

## 2018-07-23 NOTE — TELEPHONE ENCOUNTER
Called [facility] and advised that medication wa sent last night, states that they received it and it is ready for .     Medication Detail      Disp Refills Start End    PANTOPRAZOLE SODIUM 40 MG Oral Tab EC 30 tablet 0 7/22/2018     Sig: TAKE 1 TABLE

## 2018-07-29 RX ORDER — LABETALOL 300 MG/1
TABLET, FILM COATED ORAL
Qty: 60 TABLET | Refills: 0 | Status: SHIPPED | OUTPATIENT
Start: 2018-07-29 | End: 2018-08-26

## 2018-07-29 RX ORDER — SPIRONOLACTONE 50 MG/1
TABLET, FILM COATED ORAL
Qty: 30 TABLET | Refills: 0 | Status: SHIPPED | OUTPATIENT
Start: 2018-07-29 | End: 2018-08-26

## 2018-07-29 RX ORDER — ATORVASTATIN CALCIUM 40 MG/1
TABLET, FILM COATED ORAL
Qty: 30 TABLET | Refills: 0 | Status: SHIPPED | OUTPATIENT
Start: 2018-07-29 | End: 2018-08-26

## 2018-07-29 RX ORDER — HYDRALAZINE HYDROCHLORIDE 50 MG/1
TABLET, FILM COATED ORAL
Qty: 90 TABLET | Refills: 0 | Status: SHIPPED | OUTPATIENT
Start: 2018-07-29 | End: 2018-08-26

## 2018-08-26 RX ORDER — LABETALOL 300 MG/1
TABLET, FILM COATED ORAL
Qty: 60 TABLET | Refills: 0 | Status: SHIPPED | OUTPATIENT
Start: 2018-08-26 | End: 2018-10-10

## 2018-08-26 RX ORDER — HYDRALAZINE HYDROCHLORIDE 50 MG/1
TABLET, FILM COATED ORAL
Qty: 90 TABLET | Refills: 0 | Status: SHIPPED | OUTPATIENT
Start: 2018-08-26 | End: 2018-11-02

## 2018-08-26 RX ORDER — ATORVASTATIN CALCIUM 40 MG/1
TABLET, FILM COATED ORAL
Qty: 30 TABLET | Refills: 0 | Status: SHIPPED | OUTPATIENT
Start: 2018-08-26 | End: 2018-11-02

## 2018-08-26 RX ORDER — SPIRONOLACTONE 50 MG/1
TABLET, FILM COATED ORAL
Qty: 30 TABLET | Refills: 0 | Status: SHIPPED | OUTPATIENT
Start: 2018-08-26 | End: 2018-11-02

## 2018-08-27 RX ORDER — PANTOPRAZOLE SODIUM 40 MG/1
TABLET, DELAYED RELEASE ORAL
Qty: 30 TABLET | Refills: 0 | OUTPATIENT
Start: 2018-08-27

## 2018-08-31 ENCOUNTER — TELEPHONE (OUTPATIENT)
Dept: INTERNAL MEDICINE CLINIC | Facility: CLINIC | Age: 78
End: 2018-08-31

## 2018-08-31 NOTE — TELEPHONE ENCOUNTER
Liudmila thompson [facility] ci because Dr. Robert Dolan has outstanding orders on portal that need to be signed.  Tis patient is the oldest order on the portal.     Please sigfn orders ASAP

## 2018-09-19 RX ORDER — PANTOPRAZOLE SODIUM 40 MG/1
TABLET, DELAYED RELEASE ORAL
Qty: 30 TABLET | Refills: 0 | Status: SHIPPED | OUTPATIENT
Start: 2018-09-19 | End: 2018-12-14

## 2018-09-28 RX ORDER — ATORVASTATIN CALCIUM 40 MG/1
TABLET, FILM COATED ORAL
Qty: 30 TABLET | Refills: 0 | OUTPATIENT
Start: 2018-09-28

## 2018-09-28 RX ORDER — SPIRONOLACTONE 50 MG/1
TABLET, FILM COATED ORAL
Qty: 30 TABLET | Refills: 0 | OUTPATIENT
Start: 2018-09-28

## 2018-09-28 RX ORDER — HYDRALAZINE HYDROCHLORIDE 50 MG/1
TABLET, FILM COATED ORAL
Qty: 90 TABLET | Refills: 0 | OUTPATIENT
Start: 2018-09-28

## 2018-09-28 RX ORDER — LABETALOL 300 MG/1
TABLET, FILM COATED ORAL
Qty: 60 TABLET | Refills: 0 | OUTPATIENT
Start: 2018-09-28

## 2018-10-10 DIAGNOSIS — I10 UNCONTROLLED HYPERTENSION: ICD-10-CM

## 2018-10-10 RX ORDER — SPIRONOLACTONE 25 MG/1
TABLET ORAL
Qty: 60 TABLET | Refills: 0 | OUTPATIENT
Start: 2018-10-10

## 2018-10-10 NOTE — TELEPHONE ENCOUNTER
Several attempt to contact pt to schedule OV per MD request. Phone rings then busy signal.  Pt LOV was with Dr Carla Martinez. No response letter mailed to pt home address. Message sent to pharmacy also to have pt contact MD office. Will inform provider of last refill.

## 2018-10-12 NOTE — TELEPHONE ENCOUNTER
CSS please assist patient in establishing care with new PCP      Hypertensive Medications  Protocol Criteria:  · Appointment scheduled in the past 6 months or in the next 3 months  · BMP or CMP in the past 12 months  · Creatinine result < 2  Recent Outpati

## 2018-10-19 RX ORDER — PANTOPRAZOLE SODIUM 40 MG/1
TABLET, DELAYED RELEASE ORAL
Qty: 30 TABLET | Refills: 0 | OUTPATIENT
Start: 2018-10-19

## 2018-10-29 ENCOUNTER — TELEPHONE (OUTPATIENT)
Dept: OTHER | Age: 78
End: 2018-10-29

## 2018-10-29 NOTE — TELEPHONE ENCOUNTER
Pt called to establish care with Dr Moon Kasper previous Dr Tracie Escamilla pt. Wants medications refilled. Transferred to scheduling to assist in appt.

## 2018-10-31 RX ORDER — LABETALOL 300 MG/1
TABLET, FILM COATED ORAL
Qty: 60 TABLET | Refills: 0 | Status: SHIPPED | OUTPATIENT
Start: 2018-10-31 | End: 2018-11-02

## 2018-11-01 NOTE — TELEPHONE ENCOUNTER
Refill passed per [facility] protocol.   Hypertensive Medications  Protocol Criteria:  · Appointment scheduled in the past 6 months or in the next 3 months  · BMP or CMP in the past 12 months  · Creatinine result < 2  Recent Outpatient Visits

## 2018-11-02 ENCOUNTER — OFFICE VISIT (OUTPATIENT)
Dept: INTERNAL MEDICINE CLINIC | Facility: CLINIC | Age: 78
End: 2018-11-02
Payer: MEDICARE

## 2018-11-02 VITALS
WEIGHT: 148 LBS | DIASTOLIC BLOOD PRESSURE: 90 MMHG | TEMPERATURE: 98 F | HEART RATE: 96 BPM | BODY MASS INDEX: 27.94 KG/M2 | HEIGHT: 61 IN | SYSTOLIC BLOOD PRESSURE: 180 MMHG

## 2018-11-02 DIAGNOSIS — N18.30 TYPE 2 DIABETES MELLITUS WITH STAGE 3 CHRONIC KIDNEY DISEASE, WITHOUT LONG-TERM CURRENT USE OF INSULIN (HCC): ICD-10-CM

## 2018-11-02 DIAGNOSIS — I10 UNCONTROLLED HYPERTENSION: ICD-10-CM

## 2018-11-02 DIAGNOSIS — I25.10 CORONARY ARTERY DISEASE DUE TO LIPID RICH PLAQUE: ICD-10-CM

## 2018-11-02 DIAGNOSIS — N18.30 CHRONIC KIDNEY DISEASE (CKD), STAGE III (MODERATE) (HCC): ICD-10-CM

## 2018-11-02 DIAGNOSIS — M48.04 THORACIC SPINAL STENOSIS: ICD-10-CM

## 2018-11-02 DIAGNOSIS — F17.200 SMOKER: ICD-10-CM

## 2018-11-02 DIAGNOSIS — I10 ESSENTIAL HYPERTENSION: Primary | ICD-10-CM

## 2018-11-02 DIAGNOSIS — E78.5 HYPERLIPIDEMIA, UNSPECIFIED HYPERLIPIDEMIA TYPE: ICD-10-CM

## 2018-11-02 DIAGNOSIS — E11.22 TYPE 2 DIABETES MELLITUS WITH STAGE 3 CHRONIC KIDNEY DISEASE, WITHOUT LONG-TERM CURRENT USE OF INSULIN (HCC): ICD-10-CM

## 2018-11-02 DIAGNOSIS — G89.4 CHRONIC PAIN DISORDER: ICD-10-CM

## 2018-11-02 DIAGNOSIS — Z79.899 POLYPHARMACY: ICD-10-CM

## 2018-11-02 DIAGNOSIS — I25.83 CORONARY ARTERY DISEASE DUE TO LIPID RICH PLAQUE: ICD-10-CM

## 2018-11-02 DIAGNOSIS — F31.9 BIPOLAR 1 DISORDER (HCC): ICD-10-CM

## 2018-11-02 DIAGNOSIS — Z91.19 NON COMPLIANCE WITH MEDICAL TREATMENT: ICD-10-CM

## 2018-11-02 PROCEDURE — G0463 HOSPITAL OUTPT CLINIC VISIT: HCPCS | Performed by: INTERNAL MEDICINE

## 2018-11-02 PROCEDURE — 99215 OFFICE O/P EST HI 40 MIN: CPT | Performed by: INTERNAL MEDICINE

## 2018-11-02 RX ORDER — LAMOTRIGINE 150 MG/1
150 TABLET ORAL 2 TIMES DAILY
Qty: 60 TABLET | Refills: 0 | Status: CANCELLED | OUTPATIENT
Start: 2018-11-02

## 2018-11-02 RX ORDER — HYDRALAZINE HYDROCHLORIDE 50 MG/1
TABLET, FILM COATED ORAL
Qty: 90 TABLET | Refills: 0 | Status: SHIPPED | OUTPATIENT
Start: 2018-11-02 | End: 2018-11-29

## 2018-11-02 RX ORDER — LABETALOL 300 MG/1
300 TABLET, FILM COATED ORAL 2 TIMES DAILY
Qty: 60 TABLET | Refills: 0 | Status: SHIPPED | OUTPATIENT
Start: 2018-11-02 | End: 2018-11-29

## 2018-11-02 RX ORDER — ATORVASTATIN CALCIUM 40 MG/1
TABLET, FILM COATED ORAL
Qty: 30 TABLET | Refills: 0 | Status: SHIPPED | OUTPATIENT
Start: 2018-11-02 | End: 2018-11-29

## 2018-11-02 RX ORDER — SPIRONOLACTONE 50 MG/1
TABLET, FILM COATED ORAL
Qty: 30 TABLET | Refills: 0 | Status: SHIPPED | OUTPATIENT
Start: 2018-11-02 | End: 2018-11-29

## 2018-11-02 RX ORDER — METOPROLOL SUCCINATE 50 MG/1
50 TABLET, EXTENDED RELEASE ORAL DAILY
Refills: 0 | Status: CANCELLED | OUTPATIENT
Start: 2018-11-02

## 2018-11-02 RX ORDER — METOPROLOL SUCCINATE 50 MG/1
50 TABLET, EXTENDED RELEASE ORAL DAILY
COMMUNITY
End: 2018-11-02

## 2018-11-02 NOTE — PROGRESS NOTES
HPI:    Patient ID: Oletha Hammans is a 66year old female.     HPI      Patient is new to me  She was a patient of DR El Saenz and was seen by other internist   Hospitalized for incontrolled hypertension  She has known history of non compliance  With hx o (H) 11/12/2016    A1C 6.5 (H) 09/29/2016         Review of Systems   Constitutional: Negative for activity change, chills, fatigue and fever.         Appears disheveled  In no distress awake alert conversive  Able to tranfer to exaam table with one person a Tab Take 1 tablet (50 mg total) by mouth daily. Disp:  Rfl: 0   mirtazapine (REMERON) 15 MG Oral Tab Take 15 mg by mouth nightly. Disp:  Rfl:    lamoTRIgine (LAMICTAL) 150 MG Oral Tab Take 1 tablet by mouth 2 (two) times daily.  Disp:  Rfl:    PANTOPRAZOL HALLUCINATION    HISTORY:  Past Medical History:   Diagnosis Date   • Anemia    • Back problem    • Bladder cancer ([facility] [address]    • Blunt head injury 2012    Plavix stopped   • Brain aneurysm    • DDD (degenerative disc disease)    • Depression    • High blood p History: Social History    Tobacco Use      Smoking status: Current Every Day Smoker        Packs/day: 1.00        Years: 50.00        Pack years: 50        Types: Cigarettes      Smokeless tobacco: Never Used      Tobacco comment: patient reports smoking 27.96 kg/m²   Compliance with med advise  Refill given    (G89.4) Chronic pain disorder  Plan: PHYSIATRY - INTERNAL        Referral given    Discontinue norco  Per pt nothing is working for her   Prn tylenol for pain    (M48.04) Thoracic spinal stenosis  P Labetalol HCl 300 MG Oral Tab 60 tablet 0     Sig: Take 1 tablet (300 mg total) by mouth 2 (two) times daily. • Metoprolol Succinate ER 50 MG Oral Tablet 24 Hr  0     Sig: Take 1 tablet (50 mg total) by mouth daily.    • lamoTRIgine 150 MG Oral Tab 60 tab

## 2018-11-29 RX ORDER — ATORVASTATIN CALCIUM 40 MG/1
TABLET, FILM COATED ORAL
Qty: 90 TABLET | Refills: 3 | Status: SHIPPED | OUTPATIENT
Start: 2018-11-29 | End: 2019-06-23

## 2018-11-29 RX ORDER — HYDRALAZINE HYDROCHLORIDE 50 MG/1
TABLET, FILM COATED ORAL
Qty: 270 TABLET | Refills: 3 | Status: ON HOLD | OUTPATIENT
Start: 2018-11-29 | End: 2019-07-24

## 2018-11-29 RX ORDER — LABETALOL 300 MG/1
TABLET, FILM COATED ORAL
Qty: 180 TABLET | Refills: 0 | Status: SHIPPED | OUTPATIENT
Start: 2018-11-29 | End: 2019-02-28

## 2018-11-29 RX ORDER — SPIRONOLACTONE 50 MG/1
TABLET, FILM COATED ORAL
Qty: 90 TABLET | Refills: 3 | Status: ON HOLD | OUTPATIENT
Start: 2018-11-29 | End: 2019-07-24

## 2018-12-14 ENCOUNTER — TELEPHONE (OUTPATIENT)
Dept: OTHER | Age: 78
End: 2018-12-14

## 2018-12-14 ENCOUNTER — LAB ENCOUNTER (OUTPATIENT)
Dept: LAB | Age: 78
End: 2018-12-14
Attending: INTERNAL MEDICINE
Payer: MEDICARE

## 2018-12-14 ENCOUNTER — OFFICE VISIT (OUTPATIENT)
Dept: INTERNAL MEDICINE CLINIC | Facility: CLINIC | Age: 78
End: 2018-12-14
Payer: MEDICARE

## 2018-12-14 ENCOUNTER — HOSPITAL ENCOUNTER (EMERGENCY)
Facility: HOSPITAL | Age: 78
Discharge: HOME | End: 2018-12-14
Attending: EMERGENCY MEDICINE | Admitting: INTERNAL MEDICINE
Payer: MEDICARE

## 2018-12-14 ENCOUNTER — TELEPHONE (OUTPATIENT)
Dept: INTERNAL MEDICINE CLINIC | Facility: CLINIC | Age: 78
End: 2018-12-14

## 2018-12-14 VITALS
BODY MASS INDEX: 26.06 KG/M2 | DIASTOLIC BLOOD PRESSURE: 66 MMHG | WEIGHT: 138 LBS | HEART RATE: 64 BPM | SYSTOLIC BLOOD PRESSURE: 179 MMHG | HEIGHT: 61 IN

## 2018-12-14 VITALS
DIASTOLIC BLOOD PRESSURE: 62 MMHG | WEIGHT: 175 LBS | OXYGEN SATURATION: 98 % | HEIGHT: 61 IN | TEMPERATURE: 98 F | HEART RATE: 62 BPM | SYSTOLIC BLOOD PRESSURE: 187 MMHG | RESPIRATION RATE: 18 BRPM | BODY MASS INDEX: 33.04 KG/M2

## 2018-12-14 DIAGNOSIS — I25.83 CORONARY ARTERY DISEASE DUE TO LIPID RICH PLAQUE: ICD-10-CM

## 2018-12-14 DIAGNOSIS — N18.30 CHRONIC KIDNEY DISEASE (CKD), STAGE III (MODERATE) (HCC): ICD-10-CM

## 2018-12-14 DIAGNOSIS — F31.9 BIPOLAR 1 DISORDER (HCC): ICD-10-CM

## 2018-12-14 DIAGNOSIS — I10 UNCONTROLLED HYPERTENSION: ICD-10-CM

## 2018-12-14 DIAGNOSIS — F17.200 SMOKER: ICD-10-CM

## 2018-12-14 DIAGNOSIS — E78.5 HYPERLIPIDEMIA, UNSPECIFIED HYPERLIPIDEMIA TYPE: ICD-10-CM

## 2018-12-14 DIAGNOSIS — I25.10 CORONARY ARTERY DISEASE DUE TO LIPID RICH PLAQUE: ICD-10-CM

## 2018-12-14 DIAGNOSIS — Z74.09 IMPAIRED MOBILITY: ICD-10-CM

## 2018-12-14 DIAGNOSIS — M54.31 SCIATICA OF RIGHT SIDE: ICD-10-CM

## 2018-12-14 DIAGNOSIS — E11.22 TYPE 2 DIABETES MELLITUS WITH STAGE 3 CHRONIC KIDNEY DISEASE, WITHOUT LONG-TERM CURRENT USE OF INSULIN (HCC): ICD-10-CM

## 2018-12-14 DIAGNOSIS — I10 UNCONTROLLED HYPERTENSION: Primary | ICD-10-CM

## 2018-12-14 DIAGNOSIS — M48.04 THORACIC SPINAL STENOSIS: ICD-10-CM

## 2018-12-14 DIAGNOSIS — N18.30 TYPE 2 DIABETES MELLITUS WITH STAGE 3 CHRONIC KIDNEY DISEASE, WITHOUT LONG-TERM CURRENT USE OF INSULIN (HCC): ICD-10-CM

## 2018-12-14 DIAGNOSIS — D50.9 IRON DEFICIENCY ANEMIA, UNSPECIFIED IRON DEFICIENCY ANEMIA TYPE: Primary | ICD-10-CM

## 2018-12-14 LAB
ANTIBODY SCREEN: NEGATIVE
IRON SATN MFR SERPL: 3 % (ref 15–50)
IRON SERPL-MCNC: 12 MCG/DL (ref 28–170)
RH BLOOD TYPE: POSITIVE
TIBC SERPL-MCNC: 470 MCG/DL (ref 228–428)
TRANSFERRIN SERPL-MCNC: 356 MG/DL (ref 192–382)

## 2018-12-14 PROCEDURE — 83540 ASSAY OF IRON: CPT | Performed by: EMERGENCY MEDICINE

## 2018-12-14 PROCEDURE — 84439 ASSAY OF FREE THYROXINE: CPT

## 2018-12-14 PROCEDURE — 99285 EMERGENCY DEPT VISIT HI MDM: CPT

## 2018-12-14 PROCEDURE — 96374 THER/PROPH/DIAG INJ IV PUSH: CPT

## 2018-12-14 PROCEDURE — 80061 LIPID PANEL: CPT

## 2018-12-14 PROCEDURE — 85027 COMPLETE CBC AUTOMATED: CPT | Performed by: EMERGENCY MEDICINE

## 2018-12-14 PROCEDURE — 84443 ASSAY THYROID STIM HORMONE: CPT

## 2018-12-14 PROCEDURE — 96375 TX/PRO/DX INJ NEW DRUG ADDON: CPT

## 2018-12-14 PROCEDURE — 85007 BL SMEAR W/DIFF WBC COUNT: CPT | Performed by: EMERGENCY MEDICINE

## 2018-12-14 PROCEDURE — 86901 BLOOD TYPING SEROLOGIC RH(D): CPT | Performed by: EMERGENCY MEDICINE

## 2018-12-14 PROCEDURE — 85060 BLOOD SMEAR INTERPRETATION: CPT

## 2018-12-14 PROCEDURE — 85027 COMPLETE CBC AUTOMATED: CPT

## 2018-12-14 PROCEDURE — 83036 HEMOGLOBIN GLYCOSYLATED A1C: CPT

## 2018-12-14 PROCEDURE — 99214 OFFICE O/P EST MOD 30 MIN: CPT | Performed by: INTERNAL MEDICINE

## 2018-12-14 PROCEDURE — 36415 COLL VENOUS BLD VENIPUNCTURE: CPT

## 2018-12-14 PROCEDURE — 85060 BLOOD SMEAR INTERPRETATION: CPT | Performed by: EMERGENCY MEDICINE

## 2018-12-14 PROCEDURE — 84466 ASSAY OF TRANSFERRIN: CPT | Performed by: EMERGENCY MEDICINE

## 2018-12-14 PROCEDURE — 86850 RBC ANTIBODY SCREEN: CPT | Performed by: EMERGENCY MEDICINE

## 2018-12-14 PROCEDURE — 82272 OCCULT BLD FECES 1-3 TESTS: CPT

## 2018-12-14 PROCEDURE — 80053 COMPREHEN METABOLIC PANEL: CPT

## 2018-12-14 PROCEDURE — 86922 COMPATIBILITY TEST ANTIGLOB: CPT

## 2018-12-14 PROCEDURE — 85025 COMPLETE CBC W/AUTO DIFF WBC: CPT | Performed by: EMERGENCY MEDICINE

## 2018-12-14 PROCEDURE — 86900 BLOOD TYPING SEROLOGIC ABO: CPT | Performed by: EMERGENCY MEDICINE

## 2018-12-14 PROCEDURE — G0463 HOSPITAL OUTPT CLINIC VISIT: HCPCS | Performed by: INTERNAL MEDICINE

## 2018-12-14 RX ORDER — MORPHINE SULFATE 4 MG/ML
2 INJECTION, SOLUTION INTRAMUSCULAR; INTRAVENOUS ONCE
Status: COMPLETED | OUTPATIENT
Start: 2018-12-14 | End: 2018-12-14

## 2018-12-14 RX ORDER — TRAMADOL HYDROCHLORIDE 50 MG/1
50 TABLET ORAL EVERY 8 HOURS PRN
Qty: 90 TABLET | Refills: 0 | Status: SHIPPED | OUTPATIENT
Start: 2018-12-14 | End: 2019-01-07 | Stop reason: ALTCHOICE

## 2018-12-14 RX ORDER — AMOXICILLIN 250 MG
2 CAPSULE ORAL DAILY
Qty: 60 TABLET | Refills: 0 | Status: SHIPPED | OUTPATIENT
Start: 2018-12-14 | End: 2019-01-13

## 2018-12-14 RX ORDER — FERROUS SULFATE 325(65) MG
325 TABLET ORAL
Qty: 90 TABLET | Refills: 0 | Status: SHIPPED | OUTPATIENT
Start: 2018-12-14 | End: 2019-01-13

## 2018-12-14 RX ORDER — MORPHINE SULFATE 15 MG/1
15 TABLET ORAL EVERY 6 HOURS PRN
Qty: 10 TABLET | Refills: 0 | Status: SHIPPED | OUTPATIENT
Start: 2018-12-14 | End: 2019-01-07 | Stop reason: ALTCHOICE

## 2018-12-14 RX ORDER — DIAZEPAM 10 MG/1
10 TABLET ORAL NIGHTLY PRN
Qty: 30 TABLET | Refills: 0 | Status: SHIPPED | OUTPATIENT
Start: 2018-12-14 | End: 2019-01-07 | Stop reason: ALTCHOICE

## 2018-12-14 NOTE — PROGRESS NOTES
HPI:    Patient ID: Adalberto Downey is a 66year old female.     HPI    Accompanied by her caregiver    Pt is restless  Standing and walking around the exam table   compalintof low back pain  She said she cannot sleep at night due to pain  She stopped all h nervous/anxious. Current Outpatient Medications:  diazepam 10 MG Oral Tab Take 1 tablet (10 mg total) by mouth nightly as needed for Anxiety.  Disp: 30 tablet Rfl: 0   TraMADol HCl 50 MG Oral Tab Take 1 tablet (50 mg total) by mouth every 8 (eigh arteries   • Renal artery stenosis (HCC)    • Sciatica    • Scoliosis    • Shortness of breath     last 2 weeks SOB d/t codeine per patient    • Sleep apnea    • Spinal stenosis    • Tendonitis    • Unspecified essential hypertension    • Ventricular aneur and EOM are normal. Pupils are equal, round, and reactive to light. Right eye exhibits no discharge. Left eye exhibits no discharge. No scleral icterus. Neck: Neck supple. No thyromegaly present.    Cardiovascular: Normal rate, regular rhythm, S1 normal, g/dL 6.8 (LL)   Hematocrit      35.0 - 48.0 % 23.2 (L)   MCV      80.0 - 100.0 fL 59.9 (L)   MCH      27.0 - 32.0 pg 17.5 (L)   MCHC      32.0 - 37.0 g/dl 29.3 (L)   RDW      11.0 - 15.0 % 22.8 (H)   Platelet Count      377 - 400 K/   HDL Cholesterol mg total) by mouth every 8 (eight) hours as needed for Pain.        Imaging & Referrals:  None        SQ#[id number]

## 2018-12-14 NOTE — TELEPHONE ENCOUNTER
Patient contacted, advised of Dr BLUM's notes below, patient agreed to go to ED now. Ambulance called for patient transport.

## 2018-12-14 NOTE — TELEPHONE ENCOUNTER
Hemoglobin 6.8  This is new  Pt is very pale  Having a lot of back pain  BP was high       Call pt and send her to the ER    She more likely has GI bleed    It dialled her number 4 times already   Phone rings then call ends with a beeping sound  Cannot fin

## 2018-12-15 LAB
BASOPHILS # BLD: 0.1 K/UL (ref 0–0.2)
BASOPHILS NFR BLD: 1 %
EOSINOPHIL # BLD: 0.1 K/UL (ref 0–0.7)
EOSINOPHIL NFR BLD: 2 %
ERYTHROCYTE [DISTWIDTH] IN BLOOD BY AUTOMATED COUNT: 23.1 % (ref 11–15)
HCT VFR BLD AUTO: 23.5 % (ref 35–48)
HGB BLD-MCNC: 6.8 G/DL (ref 12–16)
LYMPHOCYTES # BLD: 1.6 K/UL (ref 1–4)
LYMPHOCYTES NFR BLD: 25 %
MCH RBC QN AUTO: 17.1 PG (ref 27–32)
MCHC RBC AUTO-ENTMCNC: 28.9 G/DL (ref 32–37)
MCV RBC AUTO: 59.3 FL (ref 80–100)
MONOCYTES # BLD: 0.2 K/UL (ref 0–1)
MONOCYTES NFR BLD: 3 %
NEUTROPHILS # BLD AUTO: 4.4 K/UL (ref 1.8–7.7)
NEUTROPHILS NFR BLD: 69 %
NRBC BLD-RTO: 1 % (ref ?–1)
PLATELET # BLD AUTO: 285 K/UL (ref 140–400)
PMV BLD AUTO: 8.2 FL (ref 7.4–10.3)
RBC # BLD AUTO: 3.96 M/UL (ref 3.7–5.4)
WBC # BLD AUTO: 6.4 K/UL (ref 4–11)

## 2018-12-15 NOTE — ED INITIAL ASSESSMENT (HPI)
Patient brought to ER for low hemoglobin read in blood work today, patient denies symptoms at this time except for chronic R sciatica pain

## 2018-12-15 NOTE — ED NOTES
Discharge instructions reviewed. Pt verbalized understanding with no further questions. Pt concerned about not being able to fill prescriptions until Monday, Case management called. [facility] ambulance arrived for patient transport.

## 2018-12-15 NOTE — TELEPHONE ENCOUNTER
Was at [facility] on 12/14/18 due to iron deficiency  And was discharged home. CSS=please call patient and assists with FU OV, as of this moment no FU OV schedule.       Clinical Impression:  Iron deficiency anemia, unspecified iron deficiency anemia type

## 2018-12-15 NOTE — CM/SW NOTE
Called to speak with pt regarding ability to get prescriptions filled. Pt states she has a caretaker that will be able to get them filled for her on Monday, but that she has no way to get to a pharmacy before then.  Pt states she used to get her prescriptio

## 2018-12-15 NOTE — ED PROVIDER NOTES
Patient Seen in: [facility] Emergency Department    History   Patient presents with:  Abnormal Result (metabolic, cardiac)      HPI    Patient presents to the ED after screening laboratory testing at her primary care doctor's office today showed s ESOPHAGOGASTRODUODENOSCOPY (EGD) N/A 11/26/2016    Performed by Olga Pastor MD at [facility] ENDOSCOPY   • HYSTERECTOMY     • OTHER SURGICAL HISTORY  2004    Renal Artery Stent   • OTHER SURGICAL HISTORY  2009    Local resection - bladder   • OTHER SURGICAL HIST Vitals   BP 12/14/18 2000 114/81   Pulse 12/14/18 1833 78   Resp 12/14/18 1833 18   Temp 12/14/18 1833 98 °F (36.7 °C)   Temp [facility] --    SpO2 12/14/18 1833 98 %   O2 Device --        Physical Exam   Constitutional: She is oriented to person, place, and time Abnormal            Preliminary result           Please view results for these tests on the individual orders. MD BLOOD SMEAR CONSULT   TYPE AND SCREEN    Narrative: The following orders were created for panel order TYPE AND SCREEN.   Procedure unstable Spondylolisthesis with S1 high riding; L2-3 grade 1 unstable Retrolisthesis; L5-S1 mild-mod central, L3-4 right mild, L2-3 mild central bulging discs; L1-2 right mild HNP; Hypercalciuria; Bipolar 1 disorder ([address]  Non compliance with medical treat 8.6-50 MG Oral Tab  Take 2 tablets by mouth daily. , Print Script, Disp-60 tablet, R-0    morphINE Sulfate IR 15 MG Oral Tab  Take 1 tablet (15 mg total) by mouth every 6 (six) hours as needed for Pain., Print Script, Disp-10 tablet, R-0

## 2018-12-17 ENCOUNTER — TELEPHONE (OUTPATIENT)
Dept: INTERNAL MEDICINE CLINIC | Facility: CLINIC | Age: 78
End: 2018-12-17

## 2018-12-17 RX ORDER — FERROUS SULFATE 325(65) MG
325 TABLET ORAL
Qty: 90 TABLET | Refills: 0 | Status: CANCELLED | OUTPATIENT
Start: 2018-12-17 | End: 2019-01-16

## 2018-12-17 NOTE — TELEPHONE ENCOUNTER
Patient contacted. Request a written rx of morphine. She states the ER doctor gave her RX but it needs to be \"resigned? \"     Please advise if you can reprint RX, patient aware she will need to bring back other printed RX in place of a new one.      (als

## 2018-12-17 NOTE — TELEPHONE ENCOUNTER
Dr. Jericho Christianson Staff-     Just want to confirm that you would like pt to be seen by [facility] providers as she has previously been seen and established care with Dr. Emilia Resendez from [facility] (2013/2016). Is she switching GI providers?      Please advise, thank yo

## 2018-12-17 NOTE — TELEPHONE ENCOUNTER
Pt called in requesting an appt within the next 3 days per the message from [address]. Pt stated she also needs the following  Medications sent to the [facility] on [address]   Pt stated she was also on Iron from the ER visit which I do

## 2018-12-17 NOTE — TELEPHONE ENCOUNTER
GI RN group: please advise on soon appt. PER DR. CAMERON; NEEDS TO SEE GI AS SOON AS POSSIBLE. Patient's availability Noon to 3pm Monday or Friday. F/u call made to patient. Spoke to patient and will await a call from GI.    Explained to her she

## 2018-12-20 ENCOUNTER — APPOINTMENT (OUTPATIENT)
Dept: LAB | Facility: HOSPITAL | Age: 78
End: 2018-12-20
Attending: EMERGENCY MEDICINE
Payer: MEDICARE

## 2018-12-20 PROCEDURE — 86922 COMPATIBILITY TEST ANTIGLOB: CPT

## 2018-12-20 NOTE — TELEPHONE ENCOUNTER
Attempt made to contact patient at [phone]; spoke with someone who stated this nurse had the wrong number. LMTCB at mobile number.

## 2018-12-24 NOTE — TELEPHONE ENCOUNTER
LMTCB  Transfer to triage    Patient has an appointment to see the pain center on 1/7/18    The Iron tablets were sent on 12/14/18 for #90

## 2018-12-26 RX ORDER — LAMOTRIGINE 150 MG/1
TABLET ORAL
Qty: 60 TABLET | Refills: 0 | OUTPATIENT
Start: 2018-12-26

## 2018-12-27 NOTE — TELEPHONE ENCOUNTER
Refill request for morphinefor pain from sciatica. Discharged from hospital 1 week ago following iron transfusion for anemia. hospitalist discharged with morphine and diazepam for 3 days. Message to PCP and oncall as PCP out of office.   Pt advised if

## 2018-12-30 RX ORDER — DIAZEPAM 10 MG/1
10 TABLET ORAL NIGHTLY PRN
Qty: 30 TABLET | Refills: 0 | OUTPATIENT
Start: 2018-12-30

## 2018-12-30 RX ORDER — MORPHINE SULFATE 15 MG/1
15 TABLET ORAL EVERY 6 HOURS PRN
Qty: 10 TABLET | Refills: 0 | OUTPATIENT
Start: 2018-12-30

## 2019-01-07 ENCOUNTER — OFFICE VISIT (OUTPATIENT)
Dept: NEUROLOGY | Facility: CLINIC | Age: 79
End: 2019-01-07
Payer: MEDICARE

## 2019-01-07 ENCOUNTER — TELEPHONE (OUTPATIENT)
Dept: NEUROLOGY | Facility: CLINIC | Age: 79
End: 2019-01-07

## 2019-01-07 VITALS
DIASTOLIC BLOOD PRESSURE: 71 MMHG | SYSTOLIC BLOOD PRESSURE: 141 MMHG | HEIGHT: 61 IN | WEIGHT: 138 LBS | HEART RATE: 60 BPM | RESPIRATION RATE: 16 BRPM | BODY MASS INDEX: 26.06 KG/M2

## 2019-01-07 DIAGNOSIS — M70.61 TROCHANTERIC BURSITIS OF RIGHT HIP: ICD-10-CM

## 2019-01-07 DIAGNOSIS — M79.651 PAIN OF RIGHT LATERAL UPPER THIGH: Primary | ICD-10-CM

## 2019-01-07 DIAGNOSIS — M54.16 LUMBAR RADICULOPATHY: ICD-10-CM

## 2019-01-07 PROCEDURE — 99204 OFFICE O/P NEW MOD 45 MIN: CPT | Performed by: PHYSICAL MEDICINE & REHABILITATION

## 2019-01-07 RX ORDER — PREGABALIN 75 MG/1
75 CAPSULE ORAL 2 TIMES DAILY
Qty: 60 CAPSULE | Refills: 0 | Status: SHIPPED | OUTPATIENT
Start: 2019-01-07 | End: 2019-11-19

## 2019-01-07 NOTE — TELEPHONE ENCOUNTER
L/m advising Pt. insurance was verified and MRI right hip wp cpt code [id number]. is a covered benefit and does not require authorization. Can proceed with scheduling appt.

## 2019-01-07 NOTE — PROGRESS NOTES
Lyrica prescription printed, called into [facility] on [address] In [facility], printed script has been shredded.

## 2019-01-07 NOTE — H&P
[facility] [address], [place] [address], [address], [place]    History and Physical    Lyndy Cancel Patient Status:  No patient class for patient encounter    1940 MRN [id number]   Location [facility] [address] [place] [address], back pain. Previous treatments:  Medications: gabapentin, tramadol, Morphine, Norco 10/325. Injections: S1 transforaminal CARSON, more recently T12-L1 CARSON 6/2018. Physical therapy years ago per notes. No history of lumbosacral surgery.     Current Pain: 10/10 wrist     Family History   Problem Relation Age of Onset   • Cancer Father         Lung   • Lung Disorder Mother         Emphysema   • Cancer Mother         bladder   • Diabetes Mother    • Diabetes Brother    • Cancer Other         Pancreatic     Social H right lateral thigh pain    Palpation: mild right SI joint tenderness to palpation. No lumbar paraspinal ttp. No tenderness to palpation along the right lateral thigh. Provocative tests: right SLR + at 45 degrees for worsening leg pain.  Left SLR negativ exam and result in moderate-severe central canal narrowing and moderate-severe bilateral neural foraminal   narrowing more advanced on the right. L1-L2:   Broad-based disc bulge extending into the left lateral recess. Bilateral facet arthrosis.   Changes

## 2019-01-09 ENCOUNTER — MED REC SCAN ONLY (OUTPATIENT)
Dept: NEUROLOGY | Facility: CLINIC | Age: 79
End: 2019-01-09

## 2019-02-28 RX ORDER — LABETALOL 300 MG/1
TABLET, FILM COATED ORAL
Qty: 180 TABLET | Refills: 0 | Status: SHIPPED | OUTPATIENT
Start: 2019-02-28 | End: 2019-06-16

## 2019-06-17 RX ORDER — LABETALOL 300 MG/1
TABLET, FILM COATED ORAL
Qty: 180 TABLET | Refills: 0 | Status: SHIPPED | OUTPATIENT
Start: 2019-06-17 | End: 2019-07-29 | Stop reason: ALTCHOICE

## 2019-06-17 NOTE — TELEPHONE ENCOUNTER
Please review; protocol failed. 90 day refill given on 06/17/19, appointment needed for further refills. CSS- Please schedule appt for additional refills.     Hypertensive Medications  Protocol Criteria:  · Appointment scheduled in the past 6 months or in

## 2019-06-25 RX ORDER — SPIRONOLACTONE 50 MG/1
TABLET, FILM COATED ORAL
Qty: 90 TABLET | Refills: 1 | Status: ON HOLD | OUTPATIENT
Start: 2019-06-25 | End: 2019-07-19

## 2019-06-25 RX ORDER — ATORVASTATIN CALCIUM 40 MG/1
TABLET, FILM COATED ORAL
Qty: 90 TABLET | Refills: 1 | Status: SHIPPED | OUTPATIENT
Start: 2019-06-25 | End: 2019-11-19

## 2019-07-19 ENCOUNTER — HOSPITAL ENCOUNTER (INPATIENT)
Facility: HOSPITAL | Age: 79
LOS: 5 days | Discharge: NURSING, REHAB OR LONG-TERM CARE | DRG: 291 | End: 2019-07-24
Attending: EMERGENCY MEDICINE | Admitting: HOSPITALIST
Payer: MEDICARE

## 2019-07-19 ENCOUNTER — TELEPHONE (OUTPATIENT)
Dept: INTERNAL MEDICINE CLINIC | Facility: CLINIC | Age: 79
End: 2019-07-19

## 2019-07-19 ENCOUNTER — APPOINTMENT (OUTPATIENT)
Dept: GENERAL RADIOLOGY | Facility: HOSPITAL | Age: 79
DRG: 291 | End: 2019-07-19
Attending: EMERGENCY MEDICINE
Payer: MEDICARE

## 2019-07-19 ENCOUNTER — OFFICE VISIT (OUTPATIENT)
Dept: INTERNAL MEDICINE CLINIC | Facility: CLINIC | Age: 79
End: 2019-07-19
Payer: MEDICARE

## 2019-07-19 ENCOUNTER — APPOINTMENT (OUTPATIENT)
Dept: CT IMAGING | Facility: HOSPITAL | Age: 79
DRG: 291 | End: 2019-07-19
Attending: EMERGENCY MEDICINE
Payer: MEDICARE

## 2019-07-19 VITALS
HEART RATE: 60 BPM | SYSTOLIC BLOOD PRESSURE: 204 MMHG | RESPIRATION RATE: 18 BRPM | DIASTOLIC BLOOD PRESSURE: 84 MMHG | OXYGEN SATURATION: 81 %

## 2019-07-19 DIAGNOSIS — I16.0 HYPERTENSIVE URGENCY: Primary | ICD-10-CM

## 2019-07-19 DIAGNOSIS — D50.8 OTHER IRON DEFICIENCY ANEMIA: ICD-10-CM

## 2019-07-19 DIAGNOSIS — I50.9 ACUTE ON CHRONIC CONGESTIVE HEART FAILURE, UNSPECIFIED HEART FAILURE TYPE (HCC): Primary | ICD-10-CM

## 2019-07-19 DIAGNOSIS — E78.5 HYPERLIPIDEMIA, UNSPECIFIED HYPERLIPIDEMIA TYPE: ICD-10-CM

## 2019-07-19 DIAGNOSIS — E87.6 HYPOKALEMIA: ICD-10-CM

## 2019-07-19 DIAGNOSIS — N18.30 CHRONIC KIDNEY DISEASE (CKD), STAGE III (MODERATE) (HCC): ICD-10-CM

## 2019-07-19 LAB
ANION GAP SERPL CALC-SCNC: 9 MMOL/L (ref 0–18)
ANTIBODY SCREEN: NEGATIVE
BACTERIA UR QL AUTO: NEGATIVE /HPF
BASE EXCESS BLD CALC-SCNC: 3.8 MMOL/L (ref ?–2)
BASOPHILS # BLD AUTO: 0.03 X10(3) UL (ref 0–0.2)
BASOPHILS NFR BLD AUTO: 0.8 %
BILIRUB UR QL: NEGATIVE
BUN BLD-MCNC: 14 MG/DL (ref 7–18)
BUN/CREAT SERPL: 10.6 (ref 10–20)
CALCIUM BLD-MCNC: 9 MG/DL (ref 8.5–10.1)
CHLORIDE SERPL-SCNC: 108 MMOL/L (ref 98–112)
CLARITY UR: CLEAR
CO2 SERPL-SCNC: 29 MMOL/L (ref 21–32)
COLOR UR: YELLOW
CREAT BLD-MCNC: 1.32 MG/DL (ref 0.55–1.02)
DEPRECATED RDW RBC AUTO: 50 FL (ref 35.1–46.3)
EOSINOPHIL # BLD AUTO: 0.33 X10(3) UL (ref 0–0.7)
EOSINOPHIL NFR BLD AUTO: 8.5 %
ERYTHROCYTE [DISTWIDTH] IN BLOOD BY AUTOMATED COUNT: 17.1 % (ref 11–15)
GLUCOSE BLD-MCNC: 103 MG/DL (ref 70–99)
GLUCOSE UR-MCNC: NEGATIVE MG/DL
HCO3 BLDA-SCNC: 27.6 MEQ/L (ref 21–27)
HCT VFR BLD AUTO: 29.3 % (ref 35–48)
HGB BLD-MCNC: 8.8 G/DL (ref 12–16)
HGB UR QL STRIP.AUTO: NEGATIVE
HYALINE CASTS #/AREA URNS AUTO: 8 /LPF
IMM GRANULOCYTES # BLD AUTO: 0.01 X10(3) UL (ref 0–1)
IMM GRANULOCYTES NFR BLD: 0.3 %
KETONES UR-MCNC: NEGATIVE MG/DL
LACTIC ACID (BLOOD GAS): 0.6 MMOL/L (ref 0.5–2.2)
LYMPHOCYTES # BLD AUTO: 0.78 X10(3) UL (ref 1–4)
LYMPHOCYTES NFR BLD AUTO: 20.1 %
MCH RBC QN AUTO: 24.3 PG (ref 26–34)
MCHC RBC AUTO-ENTMCNC: 30 G/DL (ref 31–37)
MCV RBC AUTO: 80.9 FL (ref 80–100)
MODIFIED ALLEN TEST: POSITIVE
MONOCYTES # BLD AUTO: 0.27 X10(3) UL (ref 0.1–1)
MONOCYTES NFR BLD AUTO: 7 %
NEUTROPHILS # BLD AUTO: 2.46 X10 (3) UL (ref 1.5–7.7)
NEUTROPHILS # BLD AUTO: 2.46 X10(3) UL (ref 1.5–7.7)
NEUTROPHILS NFR BLD AUTO: 63.3 %
NITRITE UR QL STRIP.AUTO: NEGATIVE
NT-PROBNP SERPL-MCNC: 2339 PG/ML (ref ?–450)
O2 CT BLD-SCNC: 10.5 VOL% (ref 15–23)
OSMOLALITY SERPL CALC.SUM OF ELEC: 303 MOSM/KG (ref 275–295)
OXYGEN LITERS/MINUTE: 1 L/MIN
PCO2 BLDA: 38 MM HG (ref 35–45)
PH BLDA: 7.47 [PH] (ref 7.35–7.45)
PH UR: 5 [PH] (ref 5–8)
PLATELET # BLD AUTO: 262 10(3)UL (ref 150–450)
PO2 BLDA: 44 MM HG (ref 80–100)
POTASSIUM SERPL-SCNC: 2.5 MMOL/L (ref 3.5–5.1)
POTASSIUM SERPL-SCNC: 2.7 MMOL/L (ref 3.5–5.1)
PROT UR-MCNC: 100 MG/DL
PUNCTURE CHARGE: YES
RBC # BLD AUTO: 3.62 X10(6)UL (ref 3.8–5.3)
RBC #/AREA URNS AUTO: 2 /HPF
RH BLOOD TYPE: POSITIVE
SAO2 % BLDA: 84.6 % (ref 94–100)
SODIUM SERPL-SCNC: 146 MMOL/L (ref 136–145)
SP GR UR STRIP: 1.02 (ref 1–1.03)
TROPONIN I SERPL-MCNC: <0.045 NG/ML (ref ?–0.04)
UROBILINOGEN UR STRIP-ACNC: <2
VIT C UR-MCNC: NEGATIVE MG/DL
WBC # BLD AUTO: 3.9 X10(3) UL (ref 4–11)
WBC #/AREA URNS AUTO: 29 /HPF

## 2019-07-19 PROCEDURE — 99214 OFFICE O/P EST MOD 30 MIN: CPT | Performed by: PHYSICIAN ASSISTANT

## 2019-07-19 PROCEDURE — 71045 X-RAY EXAM CHEST 1 VIEW: CPT | Performed by: EMERGENCY MEDICINE

## 2019-07-19 PROCEDURE — 99223 1ST HOSP IP/OBS HIGH 75: CPT | Performed by: HOSPITALIST

## 2019-07-19 RX ORDER — ACETAMINOPHEN 325 MG/1
650 TABLET ORAL EVERY 6 HOURS PRN
Status: DISCONTINUED | OUTPATIENT
Start: 2019-07-19 | End: 2019-07-24

## 2019-07-19 RX ORDER — ONDANSETRON 2 MG/ML
4 INJECTION INTRAMUSCULAR; INTRAVENOUS EVERY 6 HOURS PRN
Status: DISCONTINUED | OUTPATIENT
Start: 2019-07-19 | End: 2019-07-24

## 2019-07-19 RX ORDER — FUROSEMIDE 10 MG/ML
40 INJECTION INTRAMUSCULAR; INTRAVENOUS 2 TIMES DAILY
Status: DISCONTINUED | OUTPATIENT
Start: 2019-07-19 | End: 2019-07-22

## 2019-07-19 RX ORDER — FUROSEMIDE 10 MG/ML
40 INJECTION INTRAMUSCULAR; INTRAVENOUS ONCE
Status: COMPLETED | OUTPATIENT
Start: 2019-07-19 | End: 2019-07-19

## 2019-07-19 RX ORDER — SPIRONOLACTONE 50 MG/1
50 TABLET, FILM COATED ORAL DAILY
Status: COMPLETED | OUTPATIENT
Start: 2019-07-19 | End: 2019-07-19

## 2019-07-19 RX ORDER — SODIUM CHLORIDE 0.9 % (FLUSH) 0.9 %
3 SYRINGE (ML) INJECTION AS NEEDED
Status: DISCONTINUED | OUTPATIENT
Start: 2019-07-19 | End: 2019-07-24

## 2019-07-19 RX ORDER — ASPIRIN 81 MG/1
81 TABLET ORAL DAILY
Status: DISCONTINUED | OUTPATIENT
Start: 2019-07-19 | End: 2019-07-24

## 2019-07-19 RX ORDER — SPIRONOLACTONE 50 MG/1
50 TABLET, FILM COATED ORAL 2 TIMES DAILY
Status: DISCONTINUED | OUTPATIENT
Start: 2019-07-19 | End: 2019-07-24

## 2019-07-19 RX ORDER — HYDRALAZINE HYDROCHLORIDE 50 MG/1
50 TABLET, FILM COATED ORAL EVERY 8 HOURS SCHEDULED
Status: DISCONTINUED | OUTPATIENT
Start: 2019-07-19 | End: 2019-07-20

## 2019-07-19 RX ORDER — POTASSIUM CHLORIDE 1.5 G/1.77G
40 POWDER, FOR SOLUTION ORAL ONCE
Status: COMPLETED | OUTPATIENT
Start: 2019-07-19 | End: 2019-07-19

## 2019-07-19 RX ORDER — SODIUM CHLORIDE 0.9 % (FLUSH) 0.9 %
10 SYRINGE (ML) INJECTION AS NEEDED
Status: DISCONTINUED | OUTPATIENT
Start: 2019-07-19 | End: 2019-07-24

## 2019-07-19 RX ORDER — POTASSIUM CHLORIDE 14.9 MG/ML
20 INJECTION INTRAVENOUS ONCE
Status: COMPLETED | OUTPATIENT
Start: 2019-07-19 | End: 2019-07-19

## 2019-07-19 RX ORDER — AMLODIPINE BESYLATE 10 MG/1
10 TABLET ORAL DAILY
Status: DISCONTINUED | OUTPATIENT
Start: 2019-07-19 | End: 2019-07-24

## 2019-07-19 RX ORDER — ACETAMINOPHEN 500 MG
500 TABLET ORAL EVERY 6 HOURS PRN
Status: DISCONTINUED | OUTPATIENT
Start: 2019-07-19 | End: 2019-07-24

## 2019-07-19 RX ORDER — NITROGLYCERIN 0.4 MG/1
0.4 TABLET SUBLINGUAL EVERY 5 MIN PRN
Status: DISCONTINUED | OUTPATIENT
Start: 2019-07-19 | End: 2019-07-24

## 2019-07-19 RX ORDER — ATORVASTATIN CALCIUM 40 MG/1
40 TABLET, FILM COATED ORAL NIGHTLY
Status: DISCONTINUED | OUTPATIENT
Start: 2019-07-19 | End: 2019-07-24

## 2019-07-19 RX ORDER — IPRATROPIUM BROMIDE AND ALBUTEROL SULFATE 2.5; .5 MG/3ML; MG/3ML
3 SOLUTION RESPIRATORY (INHALATION) ONCE
Status: COMPLETED | OUTPATIENT
Start: 2019-07-19 | End: 2019-07-19

## 2019-07-19 RX ORDER — POTASSIUM CHLORIDE 20 MEQ/1
40 TABLET, EXTENDED RELEASE ORAL ONCE
Status: COMPLETED | OUTPATIENT
Start: 2019-07-19 | End: 2019-07-19

## 2019-07-19 RX ORDER — DIAZEPAM 5 MG/1
5 TABLET ORAL EVERY 8 HOURS PRN
Status: DISCONTINUED | OUTPATIENT
Start: 2019-07-19 | End: 2019-07-24

## 2019-07-19 RX ORDER — HYDRALAZINE HYDROCHLORIDE 20 MG/ML
20 INJECTION INTRAMUSCULAR; INTRAVENOUS ONCE
Status: COMPLETED | OUTPATIENT
Start: 2019-07-19 | End: 2019-07-19

## 2019-07-19 RX ORDER — PREGABALIN 75 MG/1
75 CAPSULE ORAL 2 TIMES DAILY
Status: DISCONTINUED | OUTPATIENT
Start: 2019-07-19 | End: 2019-07-24

## 2019-07-19 NOTE — H&P
[facility]    PATIENT'S NAME: Favio Berber   ATTENDING PHYSICIAN: Delia Aranda MD   PATIENT ACCOUNT#:   [id number]    LOCATION:  [place] [address]  MEDICAL RECORD #:   [id number]       YOB: 1940  ADMISSION DATE:       07/19/2 She has chronic renal insufficiency stage 2-3, obstructive sleep apnea.     PAST SURGICAL HISTORY:  Left wrist open reduction and internal fixation, renal artery stent, bilateral superficial femoral artery stents, multiple cystoscopies and ureteral stents Soft, nondistended. No tenderness. Positive bowel sounds. EXTREMITIES:  No peripheral edema, clubbing, or cyanosis. Dorsalis pedis pulses palpated bilaterally. SKIN:  Pale color. MUSCULOSKELETAL:  Unremarkable.   NEUROLOGIC:  Motor and sensory intac

## 2019-07-19 NOTE — CONSULTS
[facility] Cardiology Consult Note    Ahsan Bui Patient Status:  Emergency    1940 MRN [id number]   Location [address] Attending Pascale Molina MD   Hosp Day # 0 PCP Frieda Colindres MD     66year old female, consul potassium is controlled however the requirement of spironolactone to maintain a normal potassium is abnormal.  · Consider renal consult  · Continue Aldactone and oral potassium replacement, patient did not tolerate IV potassium due to burning      Jovany Am Removal of kidney stone and stent placement   • TRANSFORAMINAL LUMBAR EPIDURAL STEROID INJECTION SINGLE LEVEL N/A 6/6/2018    Performed by Greer Peralta MD at [address] [place] [address] MAIN OR   • WRIST FRACTURE SURGERY      left wrist     Family History   Problem Relatio

## 2019-07-19 NOTE — PROGRESS NOTES
HPI:    Patient ID: Lena Hutchins is a 66year old female. HPI   Patient presents in the office for medication refill and follow-up.   patient throughout the visit was making comments of how she does not know how she got here and how she does not know aspirin 81 mg effervescent tablet Disp:  Rfl:      Allergies:  Abilify [Aripiprazo*        Comment:drooling  Iodine [Radiology C*    UNKNOWN    Comment:Kidney problems  Silicone                UNKNOWN  Wellbutrin [Bupropi*    HALLUCINATION  History:  Past HISTORY  2004    Renal Artery Stent   • OTHER SURGICAL HISTORY  2009    Local resection - bladder   • OTHER SURGICAL HISTORY  2013    Removal of kidney stone and stent placement   • TRANSFORAMINAL LUMBAR EPIDURAL STEROID INJECTION SINGLE LEVEL N/A 6/6/2018 displays normal reflexes. No cranial nerve deficit. Skin: Skin is warm and dry. No rash noted. She is not diaphoretic. No erythema. No pallor. Psychiatric: She has a normal mood and affect.  Her behavior is normal. Judgment and thought content normal.

## 2019-07-19 NOTE — ED INITIAL ASSESSMENT (HPI)
Pt to ER via EMS with c/o hypoxia. Pt came from PCP office. Pt denies sob or chest pain. Pt states she was there for a routine visit to get her medications refilled. Pt is alert and oriented x3-4. Pt skin warm and dry. Pt is 84% on room air.  Pt arrived on

## 2019-07-19 NOTE — ED NOTES
Dr Brambilavere Single at bedside and gave verbal order to discontinue IV dose of potassium. Dr Hilda Walker notified.

## 2019-07-19 NOTE — ED NOTES
Per Dr Jessica Combs verbal order- keep pulse oximetry between 84-90%. Pt oxygen decreased to 1L nasal cannula. Pt denies dyspnea. Pt with current oxygen saturation of 88%. Will continue to monitor. Continuous pulse oximetry on.

## 2019-07-19 NOTE — TELEPHONE ENCOUNTER
Pt's walker is at [facility]. Pt was taken to ER and walker was left behind.

## 2019-07-19 NOTE — ED PROVIDER NOTES
Patient Seen in: [facility] AND [facility] Emergency Department    History   Patient presents with:  Dyspnea MICHELLE SOB (respiratory)    Stated Complaint: SOB    HPI    The patient is a 75-year-old female with a history of hypertension who presented to her doctor f HISTORY  2004    Renal Artery Stent   • OTHER SURGICAL HISTORY  2009    Local resection - bladder   • OTHER SURGICAL HISTORY  2013    Removal of kidney stone and stent placement   • TRANSFORAMINAL LUMBAR EPIDURAL STEROID INJECTION SINGLE LEVEL N/A 6/6/2018 normal. She exhibits no distension and no mass. There is no tenderness. Musculoskeletal: Normal range of motion. Right lower leg: She exhibits no tenderness and no edema. Left lower leg: She exhibits no tenderness and no edema.    Neurologic ------                     CBC W/ DIFFERENTIAL[[id number]]          Abnormal            Final result                 Please view results for these tests on the individual orders.    URINALYSIS WITH CULTURE REFLEX   TYPE AND SCREEN   RAINBOW DRAW BLUE   GARCIA chronic congestive heart failure ([facility] I50.9 7/19/2019 Unknown

## 2019-07-20 ENCOUNTER — APPOINTMENT (OUTPATIENT)
Dept: CV DIAGNOSTICS | Facility: HOSPITAL | Age: 79
DRG: 291 | End: 2019-07-20
Attending: HOSPITALIST
Payer: MEDICARE

## 2019-07-20 LAB
ANION GAP SERPL CALC-SCNC: 6 MMOL/L (ref 0–18)
BASOPHILS # BLD AUTO: 0.03 X10(3) UL (ref 0–0.2)
BASOPHILS NFR BLD AUTO: 0.8 %
BUN BLD-MCNC: 14 MG/DL (ref 7–18)
BUN/CREAT SERPL: 10.9 (ref 10–20)
CALCIUM BLD-MCNC: 8.6 MG/DL (ref 8.5–10.1)
CHLORIDE SERPL-SCNC: 113 MMOL/L (ref 98–112)
CO2 SERPL-SCNC: 28 MMOL/L (ref 21–32)
CREAT BLD-MCNC: 1.29 MG/DL (ref 0.55–1.02)
DEPRECATED RDW RBC AUTO: 50.2 FL (ref 35.1–46.3)
EOSINOPHIL # BLD AUTO: 0.3 X10(3) UL (ref 0–0.7)
EOSINOPHIL NFR BLD AUTO: 7.6 %
ERYTHROCYTE [DISTWIDTH] IN BLOOD BY AUTOMATED COUNT: 17.2 % (ref 11–15)
GLUCOSE BLD-MCNC: 111 MG/DL (ref 70–99)
HAV IGM SER QL: 2.3 MG/DL (ref 1.6–2.6)
HCT VFR BLD AUTO: 30.3 % (ref 35–48)
HGB BLD-MCNC: 9 G/DL (ref 12–16)
IMM GRANULOCYTES # BLD AUTO: 0.02 X10(3) UL (ref 0–1)
IMM GRANULOCYTES NFR BLD: 0.5 %
LYMPHOCYTES # BLD AUTO: 0.57 X10(3) UL (ref 1–4)
LYMPHOCYTES NFR BLD AUTO: 14.4 %
MCH RBC QN AUTO: 24 PG (ref 26–34)
MCHC RBC AUTO-ENTMCNC: 29.7 G/DL (ref 31–37)
MCV RBC AUTO: 80.8 FL (ref 80–100)
MONOCYTES # BLD AUTO: 0.26 X10(3) UL (ref 0.1–1)
MONOCYTES NFR BLD AUTO: 6.5 %
NEUTROPHILS # BLD AUTO: 2.79 X10 (3) UL (ref 1.5–7.7)
NEUTROPHILS # BLD AUTO: 2.79 X10(3) UL (ref 1.5–7.7)
NEUTROPHILS NFR BLD AUTO: 70.2 %
OSMOLALITY SERPL CALC.SUM OF ELEC: 305 MOSM/KG (ref 275–295)
PLATELET # BLD AUTO: 277 10(3)UL (ref 150–450)
POTASSIUM SERPL-SCNC: 3.3 MMOL/L (ref 3.5–5.1)
RBC # BLD AUTO: 3.75 X10(6)UL (ref 3.8–5.3)
SODIUM SERPL-SCNC: 147 MMOL/L (ref 136–145)
WBC # BLD AUTO: 4 X10(3) UL (ref 4–11)

## 2019-07-20 PROCEDURE — 93306 TTE W/DOPPLER COMPLETE: CPT | Performed by: HOSPITALIST

## 2019-07-20 PROCEDURE — 99233 SBSQ HOSP IP/OBS HIGH 50: CPT | Performed by: HOSPITALIST

## 2019-07-20 RX ORDER — LISINOPRIL 10 MG/1
10 TABLET ORAL DAILY
Status: DISCONTINUED | OUTPATIENT
Start: 2019-07-20 | End: 2019-07-24

## 2019-07-20 RX ORDER — HYDRALAZINE HYDROCHLORIDE 100 MG/1
100 TABLET, FILM COATED ORAL EVERY 8 HOURS SCHEDULED
Status: DISCONTINUED | OUTPATIENT
Start: 2019-07-20 | End: 2019-07-24

## 2019-07-20 RX ORDER — FUROSEMIDE 10 MG/ML
40 INJECTION INTRAMUSCULAR; INTRAVENOUS ONCE
Status: DISCONTINUED | OUTPATIENT
Start: 2019-07-20 | End: 2019-07-24

## 2019-07-20 RX ORDER — HEPARIN SODIUM 5000 [USP'U]/ML
5000 INJECTION, SOLUTION INTRAVENOUS; SUBCUTANEOUS EVERY 8 HOURS SCHEDULED
Status: DISCONTINUED | OUTPATIENT
Start: 2019-07-20 | End: 2019-07-24

## 2019-07-20 RX ORDER — CLONIDINE HYDROCHLORIDE 0.1 MG/1
0.1 TABLET ORAL EVERY 6 HOURS PRN
Status: DISCONTINUED | OUTPATIENT
Start: 2019-07-20 | End: 2019-07-24

## 2019-07-20 RX ORDER — FUROSEMIDE 10 MG/ML
40 INJECTION INTRAMUSCULAR; INTRAVENOUS ONCE
Status: COMPLETED | OUTPATIENT
Start: 2019-07-20 | End: 2019-07-20

## 2019-07-20 NOTE — PROGRESS NOTES
[facility]    Progress Note    Kathy Kilgore Patient Status:  Inpatient    1940 MRN [id number]   Location [facility] 3W/SW Attending Jacque Duran MD   Hosp Day # 1 PCP Shayy Olvera MD        Subjective:   Kathy Kilgore Oral 2 times per day   • pregabalin  75 mg Oral BID   • spironolactone  50 mg Oral BID       Current PRN Inpatient Meds:      Normal Saline Flush, acetaminophen, ondansetron HCl, Atropine Sulfate, nitroGLYCERIN, Normal Saline Flush, acetaminophen, diazepam 11/23/2016 19:14:39 RBBB is new Electronically signed on 07/19/2019 at 17:02 by Elizabeth Reeves MD    Assessment and Plan:   Vika Johnson is a 70yo female w/ hx sig for CAD s/p stents 2016, PAD s/p b/l femoral artery stents, renal artery stenosis s/p kevin Hospitalist        **Certification    PHYSICIAN Certification of Need for Inpatient Hospitalization - Initial Certification    Patient will require inpatient services that will reasonably be expected to span two midnight's based on the clinical documentati

## 2019-07-20 NOTE — RESPIRATORY THERAPY NOTE
Talked to the patient about the BIPAP? CPAP use and the patient said do not have one at home and do not want to use.

## 2019-07-20 NOTE — PLAN OF CARE
Problem: Patient Centered Care  Goal: Patient preferences are identified and integrated in the patient's plan of care  Description  Interventions:  - What would you like us to know as we care for you?  From home alone  - Provide timely, complete, and accu changes in mentation and behavior  - Position to facilitate oxygenation and minimize respiratory effort  - Oxygen supplementation based on oxygen saturation or ABGs  - Provide Smoking Cessation handout, if applicable  - Encourage broncho-pulmonary hygiene

## 2019-07-20 NOTE — PROGRESS NOTES
[facility]    Cardiology Progress Note    Delon Head Patient Status:  Inpatient    1940 MRN [id number]   Location [facility] 3W/SW Attending Ashish Hutson MD   [facility] Day # 1 PCP Christianne Rand MD     2019  Subjective: 07/19/19  1510   TROP <0.045       Allergies:     Abilify [Aripiprazo*        Comment:drooling  Iodine [Radiology C*    UNKNOWN    Comment:Kidney problems  Silicone                UNKNOWN  Wellbutrin [Bupropi*    HALLUCINATION    Medications:    Current Fa ([facility]     Polypharmacy     Well controlled type 2 diabetes mellitus ([facility]     Smoker     Impaired mobility     Chronic kidney disease (CKD), stage III (moderate) (HCC)     Coronary artery disease due to lipid rich plaque     Thoracic spinal stenosis     Hy

## 2019-07-21 ENCOUNTER — APPOINTMENT (OUTPATIENT)
Dept: GENERAL RADIOLOGY | Facility: HOSPITAL | Age: 79
DRG: 291 | End: 2019-07-21
Attending: INTERNAL MEDICINE
Payer: MEDICARE

## 2019-07-21 ENCOUNTER — APPOINTMENT (OUTPATIENT)
Dept: NUCLEAR MEDICINE | Facility: HOSPITAL | Age: 79
DRG: 291 | End: 2019-07-21
Attending: HOSPITALIST
Payer: MEDICARE

## 2019-07-21 LAB
DEPRECATED RDW RBC AUTO: 50.2 FL (ref 35.1–46.3)
ERYTHROCYTE [DISTWIDTH] IN BLOOD BY AUTOMATED COUNT: 17.2 % (ref 11–15)
HCT VFR BLD AUTO: 29.8 % (ref 35–48)
HGB BLD-MCNC: 8.8 G/DL (ref 12–16)
MCH RBC QN AUTO: 24 PG (ref 26–34)
MCHC RBC AUTO-ENTMCNC: 29.5 G/DL (ref 31–37)
MCV RBC AUTO: 81.2 FL (ref 80–100)
PLATELET # BLD AUTO: 268 10(3)UL (ref 150–450)
POTASSIUM SERPL-SCNC: 3.3 MMOL/L (ref 3.5–5.1)
RBC # BLD AUTO: 3.67 X10(6)UL (ref 3.8–5.3)
WBC # BLD AUTO: 5.4 X10(3) UL (ref 4–11)

## 2019-07-21 PROCEDURE — 71046 X-RAY EXAM CHEST 2 VIEWS: CPT | Performed by: INTERNAL MEDICINE

## 2019-07-21 PROCEDURE — 99233 SBSQ HOSP IP/OBS HIGH 50: CPT | Performed by: HOSPITALIST

## 2019-07-21 PROCEDURE — 78582 LUNG VENTILAT&PERFUS IMAGING: CPT | Performed by: HOSPITALIST

## 2019-07-21 NOTE — PROGRESS NOTES
[facility]    Progress Note    Kathy Kilgore Patient Status:  Inpatient    1940 MRN [id number]   Location [facility] 3W/SW Attending Jacque Duran MD   Hosp Day # 2 PCP Shayy Olvera MD        Subjective:   Kathy Kilgore Labetalol HCl  300 mg Oral 2 times per day   • pregabalin  75 mg Oral BID   • spironolactone  50 mg Oral BID       Current PRN Inpatient Meds:      cloNIDine HCl, Normal Saline Flush, acetaminophen, ondansetron HCl, Atropine Sulfate, nitroGLYCERIN, Normal interstitial edema, not significantly changed. 2. Layering bibasilar pleural effusions with associated compressive atelectasis and/or superimposed pneumonia.      Dictated by (CST): Areli Loomis MD on 7/21/2019 at 8:58     Approved by (CST): Silvia Chase happened at the time  - will check tibc    #Chronic right leg pain  - Continue PTA Lyrica    #Hx Bipolar 1 disorder  - Followed by Dr Skylar Marc psychiatry.  Had stopped taking meds    Diet: Cardiac  PT/OT: Deferred  DVT ppx: Heprain  Code status: Full  Risk

## 2019-07-21 NOTE — PROGRESS NOTES
[facility]    Progress Note    Rashi Sampson Patient Status:  Inpatient    1940 MRN [id number]   Location [facility] 3W/SW Attending Elian Mix MD   Hosp Day # 2 PCP Tomy Bruce MD       Subjective:   Sam Hinkle bilateral crackles at bases, poor air effort  Abdomen: Soft, non-tender. No mass or rebound, BS-present x 4. Extremities: Without cyanosis or edema. Peripheral pulses are 2+, equal and bilateral.        Assessment and Plan:        1.  HFpEF  - hypoxia not 07/21/2019    CREATSERUM 1.29 (H) 07/20/2019    BUN 14 07/20/2019     (H) 07/20/2019    K 3.3 (L) 07/21/2019     (H) 07/20/2019    CO2 28.0 07/20/2019     (H) 07/20/2019    CA 8.6 07/20/2019    ALB 4.3 12/14/2018    ALKPHO 68 12/14/2018 admission. Call if any questions thank you. Moon Ellis MD  [facility]. Interventional Cardiology.   [phone]

## 2019-07-22 LAB
ALBUMIN SERPL-MCNC: 3.1 G/DL (ref 3.4–5)
ALP LIVER SERPL-CCNC: 82 U/L (ref 55–142)
ALT SERPL-CCNC: 19 U/L (ref 13–56)
ANION GAP SERPL CALC-SCNC: 7 MMOL/L (ref 0–18)
AST SERPL-CCNC: 14 U/L (ref 15–37)
BASOPHILS # BLD AUTO: 0.05 X10(3) UL (ref 0–0.2)
BASOPHILS NFR BLD AUTO: 0.9 %
BILIRUB DIRECT SERPL-MCNC: <0.1 MG/DL (ref 0–0.2)
BILIRUB SERPL-MCNC: 0.4 MG/DL (ref 0.1–2)
BUN BLD-MCNC: 16 MG/DL (ref 7–18)
BUN/CREAT SERPL: 11.3 (ref 10–20)
CALCIUM BLD-MCNC: 9.2 MG/DL (ref 8.5–10.1)
CHLORIDE SERPL-SCNC: 106 MMOL/L (ref 98–112)
CO2 SERPL-SCNC: 31 MMOL/L (ref 21–32)
CREAT BLD-MCNC: 1.42 MG/DL (ref 0.55–1.02)
DEPRECATED RDW RBC AUTO: 49.7 FL (ref 35.1–46.3)
EOSINOPHIL # BLD AUTO: 0.46 X10(3) UL (ref 0–0.7)
EOSINOPHIL NFR BLD AUTO: 8 %
ERYTHROCYTE [DISTWIDTH] IN BLOOD BY AUTOMATED COUNT: 17.2 % (ref 11–15)
GLUCOSE BLD-MCNC: 155 MG/DL (ref 70–99)
HAV IGM SER QL: 2.1 MG/DL (ref 1.6–2.6)
HCT VFR BLD AUTO: 31.3 % (ref 35–48)
HGB BLD-MCNC: 9.3 G/DL (ref 12–16)
IMM GRANULOCYTES # BLD AUTO: 0.01 X10(3) UL (ref 0–1)
IMM GRANULOCYTES NFR BLD: 0.2 %
LYMPHOCYTES # BLD AUTO: 1.01 X10(3) UL (ref 1–4)
LYMPHOCYTES NFR BLD AUTO: 17.5 %
M PROTEIN MFR SERPL ELPH: 6 G/DL (ref 6.4–8.2)
MCH RBC QN AUTO: 24 PG (ref 26–34)
MCHC RBC AUTO-ENTMCNC: 29.7 G/DL (ref 31–37)
MCV RBC AUTO: 80.9 FL (ref 80–100)
MONOCYTES # BLD AUTO: 0.32 X10(3) UL (ref 0.1–1)
MONOCYTES NFR BLD AUTO: 5.6 %
NEUTROPHILS # BLD AUTO: 3.91 X10 (3) UL (ref 1.5–7.7)
NEUTROPHILS # BLD AUTO: 3.91 X10(3) UL (ref 1.5–7.7)
NEUTROPHILS NFR BLD AUTO: 67.8 %
OSMOLALITY SERPL CALC.SUM OF ELEC: 302 MOSM/KG (ref 275–295)
PLATELET # BLD AUTO: 314 10(3)UL (ref 150–450)
POTASSIUM SERPL-SCNC: 3.3 MMOL/L (ref 3.5–5.1)
RBC # BLD AUTO: 3.87 X10(6)UL (ref 3.8–5.3)
SODIUM SERPL-SCNC: 144 MMOL/L (ref 136–145)
WBC # BLD AUTO: 5.8 X10(3) UL (ref 4–11)

## 2019-07-22 PROCEDURE — 99233 SBSQ HOSP IP/OBS HIGH 50: CPT | Performed by: HOSPITALIST

## 2019-07-22 PROCEDURE — 99222 1ST HOSP IP/OBS MODERATE 55: CPT | Performed by: INTERNAL MEDICINE

## 2019-07-22 RX ORDER — SENNA AND DOCUSATE SODIUM 50; 8.6 MG/1; MG/1
2 TABLET, FILM COATED ORAL DAILY
Status: DISCONTINUED | OUTPATIENT
Start: 2019-07-22 | End: 2019-07-23

## 2019-07-22 RX ORDER — FUROSEMIDE 10 MG/ML
40 INJECTION INTRAMUSCULAR; INTRAVENOUS 3 TIMES DAILY
Status: DISCONTINUED | OUTPATIENT
Start: 2019-07-22 | End: 2019-07-24

## 2019-07-22 NOTE — PLAN OF CARE
Patient is alert and oriented, cooperative and pleasant. Patient calls appropriately, but at times says random non-sensical things when conversing. Patient still sounds wheezy with labored breathing when ambulating.   Resting comfortably in bed, locked in temperature  - Assess for signs of decreased coronary artery perfusion - ex.  Angina  - Evaluate fluid balance, assess for edema, trend weights  Outcome: Progressing     Problem: RESPIRATORY - ADULT  Goal: Achieves optimal ventilation and oxygenation  Descr

## 2019-07-22 NOTE — CM/SW NOTE
SW received MDO for discharge planning and caregiver resources. SW met w/ pt to discuss eventual discharge plans. Pt lives at home alone in [facility]. [address] lives in a 1 level condo w/ an elevator.  Pt reports to be independent w/ most ADL's, but does not d

## 2019-07-22 NOTE — PROGRESS NOTES
[facility] AND [facility]  MHS/AMG Cardiology Progress Note    Jullie Dubin Patient Status:  Inpatient    1940 MRN [id number]   Location [facility] 3W/SW Attending Caitlyn Lacy MD   Hosp Day # 3 PCP Nino Soto MD     66year old female, c grade 1 unstable Spondylolisthesis with S1 high riding 10/2/2014   • L5-S1 mild-mod central, L3-4 right mild, L2-3 mild central bulging discs 12/8/2014   • Nephrolithiasis    • Osteoarthritis    • Other and unspecified hyperlipidemia    • Peripheral vascul 0759  Gross per 24 hour   Intake 679 ml   Output 1500 ml   Net -821 ml       Physical Exam:     General: Alert and oriented x 3. No apparent distress. No respiratory or constitutional distress. HEENT: Normocephalic, anicteric sclera, neck supple.   Neck: N

## 2019-07-22 NOTE — PLAN OF CARE
BP elevated at night but resolved with night time medication. R forearm dressing CDI. Maintained 2L O2NC with saturations mid 90s. Con't IV lasix. Plan to wean off oxygen and to d/c home when medically stable.     Problem: Patient Centered Care  Goal: Donte trend weights  Outcome: Progressing     Problem: RESPIRATORY - ADULT  Goal: Achieves optimal ventilation and oxygenation  Description  INTERVENTIONS:  - Assess for changes in respiratory status  - Assess for changes in mentation and behavior  - Position to

## 2019-07-22 NOTE — CONSULTS
[facility]    Report of Consultation    Vika Johnson Patient Status:  Inpatient    1940 MRN [id number]   Location [facility] 3W/SW Attending Zunilda Hamilton MD   Hosp Day # 3 PCP Yohan Ricks MD     Date of Admission: Scoliosis    • Shortness of breath     last 2 weeks SOB d/t codeine per patient    • Sleep apnea    • Spinal stenosis    • Tendonitis    • Unspecified essential hypertension    • Ventricular aneurysm        Past Surgical History  Past Surgical History:   P mg Oral Q8H [facility]   furosemide (LASIX) injection 40 mg 40 mg Intravenous Once   lisinopril (PRINIVIL,ZESTRIL) tab 10 mg 10 mg Oral Daily   cloNIDine HCl (CATAPRES) tab 0.1 mg 0.1 mg Oral Q6H PRN   Normal Saline Flush 0.9 % injection 3 mL 3 mL Intravenous PRN items are noted in HPI. Physical Exam:   Blood pressure 131/43, pulse 63, temperature 98.5 °F (36.9 °C), temperature source Oral, resp. rate 18, height 5' 1\" (1.549 m), weight 149 lb 14.4 oz (68 kg), SpO2 93 %.     HEENT : at , nc , aleida   Neck : suppl with 50 pack - year h/ smoking      Quit smoking jan/2019      No clinical evidence of acute exacerbation like cough or wheezes    4- pulmonary edema secondary to CHF / no significant free flow  pleural effusion    5- HTN , HL , CAD     6- DVT prophylaxis

## 2019-07-22 NOTE — PROGRESS NOTES
[facility]    Progress Note    Fern Medici Patient Status:  Inpatient    1940 MRN [id number]   Location [facility] 3W/SW Attending Tiffany Schwartz MD   Hosp Day # 3 PCP Sandee Neves MD        Subjective:   Fern Medici mg Intravenous Once   • lisinopril  10 mg Oral Daily   • amLODIPine Besylate  10 mg Oral Daily   • aspirin  81 mg Oral Daily   • atorvastatin  40 mg Oral Nightly   • Labetalol HCl  300 mg Oral 2 times per day   • pregabalin  75 mg Oral BID   • spironolacto BILT  --   --   --   --  0.4   TP  --   --   --   --  6.0*    < > = values in this interval not displayed. No results found for: PT, INR    Culture:  Hospital Encounter on 07/19/19   1.  URINE CULTURE, ROUTINE     Status: None    Collection Time: 07/1 s/p stent. Renal arterial Dopper 6/2018 showed no hemodynamically significant right or left renal artery stenosis. - BP 210s/80s on admission. Hospitalized for BP 250s/140s in 6/2018. Not on med for 3 months, recurrent issue.   - Current regimen: amlodipin

## 2019-07-23 LAB
ANION GAP SERPL CALC-SCNC: 6 MMOL/L (ref 0–18)
BASOPHILS # BLD AUTO: 0.04 X10(3) UL (ref 0–0.2)
BASOPHILS NFR BLD AUTO: 0.9 %
BUN BLD-MCNC: 20 MG/DL (ref 7–18)
BUN/CREAT SERPL: 15.9 (ref 10–20)
CALCIUM BLD-MCNC: 9 MG/DL (ref 8.5–10.1)
CHLORIDE SERPL-SCNC: 105 MMOL/L (ref 98–112)
CO2 SERPL-SCNC: 32 MMOL/L (ref 21–32)
CREAT BLD-MCNC: 1.26 MG/DL (ref 0.55–1.02)
DEPRECATED RDW RBC AUTO: 50 FL (ref 35.1–46.3)
EOSINOPHIL # BLD AUTO: 0.51 X10(3) UL (ref 0–0.7)
EOSINOPHIL NFR BLD AUTO: 11.1 %
ERYTHROCYTE [DISTWIDTH] IN BLOOD BY AUTOMATED COUNT: 17.1 % (ref 11–15)
GLUCOSE BLD-MCNC: 118 MG/DL (ref 70–99)
HAV IGM SER QL: 1.9 MG/DL (ref 1.6–2.6)
HAV IGM SER QL: 2.1 MG/DL (ref 1.6–2.6)
HCT VFR BLD AUTO: 30.2 % (ref 35–48)
HGB BLD-MCNC: 9 G/DL (ref 12–16)
IMM GRANULOCYTES # BLD AUTO: 0.01 X10(3) UL (ref 0–1)
IMM GRANULOCYTES NFR BLD: 0.2 %
LYMPHOCYTES # BLD AUTO: 1.09 X10(3) UL (ref 1–4)
LYMPHOCYTES NFR BLD AUTO: 23.7 %
MCH RBC QN AUTO: 23.9 PG (ref 26–34)
MCHC RBC AUTO-ENTMCNC: 29.8 G/DL (ref 31–37)
MCV RBC AUTO: 80.1 FL (ref 80–100)
MONOCYTES # BLD AUTO: 0.31 X10(3) UL (ref 0.1–1)
MONOCYTES NFR BLD AUTO: 6.8 %
NEUTROPHILS # BLD AUTO: 2.63 X10 (3) UL (ref 1.5–7.7)
NEUTROPHILS # BLD AUTO: 2.63 X10(3) UL (ref 1.5–7.7)
NEUTROPHILS NFR BLD AUTO: 57.3 %
OSMOLALITY SERPL CALC.SUM OF ELEC: 300 MOSM/KG (ref 275–295)
PLATELET # BLD AUTO: 290 10(3)UL (ref 150–450)
POTASSIUM SERPL-SCNC: 3.1 MMOL/L (ref 3.5–5.1)
POTASSIUM SERPL-SCNC: 4 MMOL/L (ref 3.5–5.1)
RBC # BLD AUTO: 3.77 X10(6)UL (ref 3.8–5.3)
SODIUM SERPL-SCNC: 143 MMOL/L (ref 136–145)
WBC # BLD AUTO: 4.6 X10(3) UL (ref 4–11)

## 2019-07-23 PROCEDURE — 99233 SBSQ HOSP IP/OBS HIGH 50: CPT | Performed by: HOSPITALIST

## 2019-07-23 PROCEDURE — 99232 SBSQ HOSP IP/OBS MODERATE 35: CPT | Performed by: INTERNAL MEDICINE

## 2019-07-23 RX ORDER — SENNA AND DOCUSATE SODIUM 50; 8.6 MG/1; MG/1
2 TABLET, FILM COATED ORAL 2 TIMES DAILY
Status: DISCONTINUED | OUTPATIENT
Start: 2019-07-23 | End: 2019-07-24

## 2019-07-23 RX ORDER — POLYETHYLENE GLYCOL 3350 17 G/17G
17 POWDER, FOR SOLUTION ORAL DAILY
Status: DISCONTINUED | OUTPATIENT
Start: 2019-07-23 | End: 2019-07-24

## 2019-07-23 RX ORDER — POTASSIUM CHLORIDE 20 MEQ/1
40 TABLET, EXTENDED RELEASE ORAL EVERY 4 HOURS
Status: COMPLETED | OUTPATIENT
Start: 2019-07-23 | End: 2019-07-23

## 2019-07-23 NOTE — CM/SW NOTE
7/24 - 851am:  Hx bipolor I disorder, will need PAS screen. ELLA faxed clinicals to Katie/PAS screener at [facility] fax: [phone].  ELLA Estrada from [facility] regarding referral status.     ----------------  7/23 - 1250pm:  Pt's sister requested re

## 2019-07-23 NOTE — PROGRESS NOTES
[facility]    Progress Note    Kathy Kilgore Patient Status:  Inpatient    1940 MRN [id number]   Location [facility] 3W/SW Attending Jacque Duran MD   Hosp Day # 4 PCP Shayy Olvera MD        Subjective:   Kathy Kilgore furosemide  40 mg Intravenous Once   • lisinopril  10 mg Oral Daily   • amLODIPine Besylate  10 mg Oral Daily   • aspirin  81 mg Oral Daily   • atorvastatin  40 mg Oral Nightly   • Labetalol HCl  300 mg Oral 2 times per day   • pregabalin  75 mg Oral BID results found for: PT, INR    Culture:  Hospital Encounter on 07/19/19   1. URINE CULTURE, ROUTINE     Status: None    Collection Time: 07/19/19 10:10 PM   Result Value Ref Range    Urine Culture No Growth at 18-24 hrs. N/A       Imaging/EKG:            Ass taking meds    Diet: Cardiac  PT/OT: Deferred  DVT ppx: Heprain  Code status: Full  Risk level: High risk  Dispo: per clinical course to snf soon    Greater than 35 minutes spent, >50% spent counseling re: treatment plan and workup  Junior Ramírez MD

## 2019-07-23 NOTE — PROGRESS NOTES
[facility]    Progress Note      Assessment and Plan:     1. Acute on chronic respiratory failure–the patient has CHF superimposed on COPD. She is improving clinically.     Recommendations: Gentle diuresis, nebulizer therapy, Taper supplem

## 2019-07-23 NOTE — PROGRESS NOTES
[facility]  MHS/AMG Cardiology Progress Note    Ahsan Bui Patient Status:  Inpatient    1940 MRN [id number]   Location [facility] 3W/SW Attending Marie Velez MD   Hosp Day # 4 PCP Frieda Colindres MD     66year old female, c Nephrolithiasis    • Osteoarthritis    • Other and unspecified hyperlipidemia    • Peripheral vascular disease ([address]     stents in left iliac and left femoral artery and both proximal renal arteries   • Renal artery stenosis (HCC)    • Sciatica    • Scolio respiratory or constitutional distress. HEENT: Normocephalic, anicteric sclera, neck supple. Neck: No JVD, carotids 2+, no bruits. Cardiac: Regular rate and rhythm.  S1, S2 normal. No murmur, pericardial rub, S3.  Lungs: Clear without wheezes, rales, rho

## 2019-07-23 NOTE — OCCUPATIONAL THERAPY NOTE
OCCUPATIONAL THERAPY EVALUATION - INPATIENT     Room Number: 316/316-A  Evaluation Date: 7/23/2019  Type of Evaluation: Initial  Presenting Problem: (copd)    Physician Order: IP Consult to Occupational Therapy  Reason for Therapy: ADL/IADL Dysfunction and DISCHARGE RECOMMENDATIONS  OT Discharge Recommendations: Sub-acute rehabilitation  OT Device Recommendations: TBD    PLAN  OT Treatment Plan: Endurance training;Patient/Family education;Patient/Family training;Functional transfer training;ADL training OTHER SURGICAL HISTORY  2009    Local resection - bladder   • OTHER SURGICAL HISTORY  2013    Removal of kidney stone and stent placement   • TRANSFORAMINAL LUMBAR EPIDURAL STEROID INJECTION SINGLE LEVEL N/A 6/6/2018    Performed by Nora Sifuentes MD Scale): 42.03  CMS Modifier (G-Code): CJ    FUNCTIONAL TRANSFER ASSESSMENT  Supine to Sit : Supervision  Sit to Stand: Contact guard assist  Chair Transfer: supervision    Bedroom Mobility: rw and close supervision    BALANCE ASSESSMENT  Static Sitting: in

## 2019-07-23 NOTE — PHYSICAL THERAPY NOTE
PHYSICAL THERAPY EVALUATION - INPATIENT     Room Number: 316/316-A  Evaluation Date: 7/23/2019  Type of Evaluation: Initial   Physician Order: See Comment for Specific Order    Presenting Problem: p/w acute on chronic HF, acute hypoxic respiratory failure Inpatient Basic Mobility Short Form. Research supports that patients with this level of impairment may benefit from rehab stay. Pt lives alone with limited to no social support.  PT recommendation short sub-acute rehab stay at d/c to address deficits and r hypertension    • Ventricular aneurysm        Past Surgical History  Past Surgical History:   Procedure Laterality Date   • BRAIN SURGERY      7 years ago    • CARPAL TUNNEL RELEASE     • COLONOSCOPY N/A 11/26/2016    Performed by Sergio Zuñiga MD at [facility] errors made, assistance required to generate solutions and assistance required to implement solutions    RANGE OF MOTION AND STRENGTH ASSESSMENT  Upper extremity ROM and strength are within functional limits     Lower extremity ROM is within functional auguste mechanics  Energy conservation  Functional activity tolerated  Gait training  Transfer training  pursed lipped breathing    Patient End of Session: Up in chair;Needs met;RN aware of session/findings;Call light within reach; All patient questions and concern

## 2019-07-23 NOTE — TELEPHONE ENCOUNTER
FYI: per pt she's still at the [address] [place] [address] and she'll come and pick it up her walker when she'll out of the hospital., per pt pls keep it for her.

## 2019-07-24 VITALS
HEIGHT: 61 IN | BODY MASS INDEX: 27.92 KG/M2 | HEART RATE: 63 BPM | DIASTOLIC BLOOD PRESSURE: 50 MMHG | SYSTOLIC BLOOD PRESSURE: 131 MMHG | RESPIRATION RATE: 18 BRPM | OXYGEN SATURATION: 90 % | WEIGHT: 147.88 LBS | TEMPERATURE: 98 F

## 2019-07-24 LAB
ABSOLUTE IMMATURE GRANULOCYTES (OFFPRE24): NORMAL
ABSOLUTE IMMATURE GRANULOCYTES (OFFPRE24): NORMAL
ANION GAP SERPL CALC-SCNC: 6 MMOL/L (ref 0–18)
BASO+EOS+MONOS # BLD: NORMAL 10*3/UL
BASO+EOS+MONOS # BLD: NORMAL 10*3/UL
BASO+EOS+MONOS NFR BLD: NORMAL %
BASO+EOS+MONOS NFR BLD: NORMAL %
BASOPHILS # BLD AUTO: 0.04 X10(3) UL (ref 0–0.2)
BASOPHILS # BLD: NORMAL 10*3/UL
BASOPHILS # BLD: NORMAL 10*3/UL
BASOPHILS NFR BLD AUTO: 0.7 %
BASOPHILS NFR BLD: NORMAL %
BASOPHILS NFR BLD: NORMAL %
BUN BLD-MCNC: 28 MG/DL (ref 7–18)
BUN/CREAT SERPL: 19 (ref 10–20)
CALCIUM BLD-MCNC: 9.7 MG/DL (ref 8.5–10.1)
CHLORIDE SERPL-SCNC: 105 MMOL/L (ref 98–112)
CO2 SERPL-SCNC: 31 MMOL/L (ref 21–32)
CREAT BLD-MCNC: 1.47 MG/DL (ref 0.55–1.02)
DEPRECATED RDW RBC AUTO: 49.5 FL (ref 35.1–46.3)
DIFFERENTIAL METHOD BLD: NORMAL
DIFFERENTIAL METHOD BLD: NORMAL
EOSINOPHIL # BLD AUTO: 0.52 X10(3) UL (ref 0–0.7)
EOSINOPHIL # BLD: NORMAL 10*3/UL
EOSINOPHIL # BLD: NORMAL 10*3/UL
EOSINOPHIL NFR BLD AUTO: 9 %
EOSINOPHIL NFR BLD: NORMAL %
EOSINOPHIL NFR BLD: NORMAL %
ERYTHROCYTE [DISTWIDTH] IN BLOOD BY AUTOMATED COUNT: 17.2 % (ref 11–15)
ERYTHROCYTE [DISTWIDTH] IN BLOOD: NORMAL %
ERYTHROCYTE [DISTWIDTH] IN BLOOD: NORMAL %
GLUCOSE BLD-MCNC: 120 MG/DL (ref 70–99)
HCT VFR BLD AUTO: 31.8 % (ref 35–48)
HCT VFR BLD CALC: 31.8 %
HCT VFR BLD CALC: 31.8 %
HGB BLD-MCNC: 9.6 G/DL
HGB BLD-MCNC: 9.6 G/DL
HGB BLD-MCNC: 9.6 G/DL (ref 12–16)
IMM GRANULOCYTES # BLD AUTO: 0.01 X10(3) UL (ref 0–1)
IMM GRANULOCYTES NFR BLD: 0.2 %
IMMATURE GRANULOCYTES (OFFPRE25): NORMAL
IMMATURE GRANULOCYTES (OFFPRE25): NORMAL
LYMPHOCYTES # BLD AUTO: 1.17 X10(3) UL (ref 1–4)
LYMPHOCYTES # BLD: NORMAL 10*3/UL
LYMPHOCYTES # BLD: NORMAL 10*3/UL
LYMPHOCYTES NFR BLD AUTO: 20.3 %
LYMPHOCYTES NFR BLD: NORMAL %
LYMPHOCYTES NFR BLD: NORMAL %
MCH RBC QN AUTO: 24.1 PG
MCH RBC QN AUTO: 24.1 PG
MCH RBC QN AUTO: 24.1 PG (ref 26–34)
MCHC RBC AUTO-ENTMCNC: 30.2 G/DL
MCHC RBC AUTO-ENTMCNC: 30.2 G/DL
MCHC RBC AUTO-ENTMCNC: 30.2 G/DL (ref 31–37)
MCV RBC AUTO: 79.7 FL
MCV RBC AUTO: 79.7 FL
MCV RBC AUTO: 79.7 FL (ref 80–100)
MONOCYTES # BLD AUTO: 0.39 X10(3) UL (ref 0.1–1)
MONOCYTES # BLD: NORMAL 10*3/UL
MONOCYTES # BLD: NORMAL 10*3/UL
MONOCYTES NFR BLD AUTO: 6.8 %
MONOCYTES NFR BLD: NORMAL %
MONOCYTES NFR BLD: NORMAL %
MPV (OFFPRE2): NORMAL
MPV (OFFPRE2): NORMAL
NEUTROPHILS # BLD AUTO: 3.62 X10 (3) UL (ref 1.5–7.7)
NEUTROPHILS # BLD AUTO: 3.62 X10(3) UL (ref 1.5–7.7)
NEUTROPHILS # BLD: NORMAL 10*3/UL
NEUTROPHILS # BLD: NORMAL 10*3/UL
NEUTROPHILS NFR BLD AUTO: 63 %
NEUTROPHILS NFR BLD: NORMAL %
NEUTROPHILS NFR BLD: NORMAL %
NRBC BLD MANUAL-RTO: NORMAL %
NRBC BLD MANUAL-RTO: NORMAL %
OSMOLALITY SERPL CALC.SUM OF ELEC: 301 MOSM/KG (ref 275–295)
PLAT MORPH BLD: NORMAL
PLAT MORPH BLD: NORMAL
PLATELET # BLD AUTO: 330 10(3)UL (ref 150–450)
PLATELET # BLD: 330 10*3/UL
PLATELET # BLD: 330 10*3/UL
POTASSIUM SERPL-SCNC: 4.6 MMOL/L (ref 3.5–5.1)
RBC # BLD AUTO: 3.99 X10(6)UL (ref 3.8–5.3)
RBC # BLD: 3.99 10*6/UL
RBC # BLD: 3.99 10*6/UL
RBC MORPH BLD: NORMAL
RBC MORPH BLD: NORMAL
SODIUM SERPL-SCNC: 142 MMOL/L (ref 136–145)
WBC # BLD AUTO: 5.8 X10(3) UL (ref 4–11)
WBC # BLD: 5.8 10*3/UL
WBC # BLD: 5.8 10*3/UL
WBC MORPH BLD: NORMAL
WBC MORPH BLD: NORMAL

## 2019-07-24 PROCEDURE — 99232 SBSQ HOSP IP/OBS MODERATE 35: CPT | Performed by: INTERNAL MEDICINE

## 2019-07-24 PROCEDURE — 99239 HOSP IP/OBS DSCHRG MGMT >30: CPT | Performed by: HOSPITALIST

## 2019-07-24 RX ORDER — TORSEMIDE 20 MG/1
20 TABLET ORAL
Status: DISCONTINUED | OUTPATIENT
Start: 2019-07-25 | End: 2019-07-24

## 2019-07-24 RX ORDER — FUROSEMIDE 10 MG/ML
40 INJECTION INTRAMUSCULAR; INTRAVENOUS ONCE
Status: DISCONTINUED | OUTPATIENT
Start: 2019-07-24 | End: 2019-07-24

## 2019-07-24 RX ORDER — SPIRONOLACTONE 50 MG/1
50 TABLET, FILM COATED ORAL 2 TIMES DAILY
Refills: 0 | Status: SHIPPED | COMMUNITY
Start: 2019-07-24 | End: 2019-08-05

## 2019-07-24 RX ORDER — HYDRALAZINE HYDROCHLORIDE 100 MG/1
100 TABLET, FILM COATED ORAL EVERY 8 HOURS SCHEDULED
Refills: 0 | Status: SHIPPED | COMMUNITY
Start: 2019-07-24 | End: 2019-08-05 | Stop reason: ALTCHOICE

## 2019-07-24 RX ORDER — LISINOPRIL 10 MG/1
10 TABLET ORAL DAILY
Refills: 0 | Status: SHIPPED | COMMUNITY
Start: 2019-07-25 | End: 2019-08-08

## 2019-07-24 RX ORDER — TORSEMIDE 20 MG/1
20 TABLET ORAL DAILY
Refills: 0 | Status: SHIPPED | COMMUNITY
Start: 2019-07-24 | End: 2019-08-08

## 2019-07-24 NOTE — CM/SW NOTE
SILVINA from [facility] stated PAS screen has been completed via level 1 desk review. SW confirmed that pt is medically stable to discharge today.  Lissetmisalemuel Benjamin from [facility] stated they will be ready for pt at [facility] spoke w/ pt's sister, Cali Tano who stated she

## 2019-07-24 NOTE — CARDIAC REHAB
CARDIAC REHAB HEART FAILURE EDUCATION    Handouts provided and reviewed: CHF Booklet. Activity: Chair for all meals: Reviewed       Ambulation: Reviewed       Tolerated Activity: Well         Disease Process: Disease process reviewed.     Reviewed the f

## 2019-07-24 NOTE — DISCHARGE SUMMARY
[place] [facility] - [facility]    Discharge Summary    Vika Johnson Patient Status:  Inpatient    1940 MRN [id number]   Location [facility] 3W/SW Attending Arzella Saint, MD   [address] Day # 5 PCP Yohan Ricks MD     Date of Admission:  respiratory effort  CV: Heart with regular rate and rhythm  Abd: Abdomen soft, nontender, nondistended, bowel sounds present  Neuro: No acute focal deficits  MSK: Full range of motion in extremities  Skin: Warm and dry  Psych: Normal affect  Ext: no c/c/e hypertension  #Treatment nonadherence  - Hx right renal artery stenosis s/p stent. Renal arterial Dopper 6/2018 showed no hemodynamically significant right or left renal artery stenosis. - BP 210s/80s on admission. Hospitalized for BP 250s/140s in 6/2018. aspirin 81 MG Tbec      Take 81 mg by mouth daily.  aspirin 81 mg effervescent tablet   Refills:  0     atorvastatin 40 MG Tabs  Commonly known as:  LIPITOR      TAKE 1 TABLET BY MOUTH EVERY NIGHT   Quantity:  90 tablet  Refills:  1     Labetalol HCl 300

## 2019-07-24 NOTE — PHYSICAL THERAPY NOTE
PHYSICAL THERAPY TREATMENT NOTE - INPATIENT     Room Number: 865/922-V       Presenting Problem: p/w acute on chronic HF, acute hypoxic respiratory failure    Problem List  Principal Problem:    Acute on chronic congestive heart failure, unspecified heart education;Gait training;Transfer training;Balance training;Strengthening    SUBJECTIVE  \"I have to go to the bathroom. \"    OBJECTIVE  Precautions: Cardiac    WEIGHT BEARING RESTRICTION  Weight Bearing Restriction: None    PAIN ASSESSMENT   Rating: (not r questions and concerns addressed; Other (Comment)(reclined to comfort)    CURRENT GOALS   Goals to be met by: 8/6/19  Patient Goal Patient's self-stated goal is: to be able to go back home, walk community distance to grocery store   Goal #1 Patient is able

## 2019-07-24 NOTE — PROGRESS NOTES
[facility]    Cardiology Progress Note    Deepthi Campos Patient Status:  Inpatient    1940 MRN [id number]   Location [facility] 3W/SW Attending Vinicius Villa MD   Hosp Day # 5 PCP Bjorn iMller MD     Primary Cardiologi 50 to 55%, no significant valve disease, mild pulmonary hypertension (urinary artery pressure 40 mmHg)      Assessment and Plan:    · HFpEF, restrictive physiology - degree of volume overload on exam is not as impressive as her degree of hypoxia.  VQ negat

## 2019-07-24 NOTE — PROGRESS NOTES
[facility]    Progress Note      Assessment and Plan:     1. Acute on chronic respiratory failure–the patient has CHF superimposed on COPD. She is improving clinically.   The patient is now off oxygen and is okay to be discharged from my

## 2019-07-24 NOTE — PLAN OF CARE
Patient is alert and oriented, cooperative and pleasant. Patient is looking forward to going to [facility] and is getting picked up by her sister. Patient repeatedly states she lives in poverty; called to get an application for food stamps.   Discharge in color and temperature  - Assess for signs of decreased coronary artery perfusion - ex.  Angina  - Evaluate fluid balance, assess for edema, trend weights  Outcome: Adequate for Discharge     Problem: RESPIRATORY - ADULT  Goal: Achieves optimal ventilation a

## 2019-07-25 ENCOUNTER — PATIENT OUTREACH (OUTPATIENT)
Dept: CASE MANAGEMENT | Age: 79
End: 2019-07-25

## 2019-07-25 NOTE — CM/SW NOTE
Spoke with Dr. Glynis Meckel, Hospitalist, and she verified the ordering of patient's meds via telephone through [employer] at [phone] when they open at 0800. [facility] will call patient to notify her that meds have been ordered.     Meds being ordered are:    [employer]

## 2019-07-25 NOTE — CM/SW NOTE
Patient was discharged from CV Room 316 yesterday evening to her sister Alm Leventhal. Patient was being discharged to [facility], and they were expecting her to arrive sometime after 1700, when her bed was available.   Patient never arrived at [facility], as it w 7/19/19 from [facility], [address] [facility] stating that patient had originally come into the office for medication refills, so she may have been out of all her other meds, and then she was sent to ER and admitted. So no meds were ever refilled.   EDCM exp

## 2019-07-25 NOTE — PROGRESS NOTES
NCM attempted to contact the patient for hospital follow up. Patient's phone is not working at this time. Information in notes states that patient is staying with her sister Pat(on [address]) at this time.  [facility] called Devyn Roche and Guerline Ohara [phone] [facility] contact i

## 2019-07-25 NOTE — CM/SW NOTE
Called patient and informed her that all her medications, except Lyrica per Dr. Caleb Berg, have been called into [facility] and are ready for . Patient verbalized understanding and appreciation.

## 2019-07-25 NOTE — CM/SW NOTE
Hugo Martino [address] [employer], [place] at [phone] and ordered previously named medications. Will call patient shortly to notify her that meds have been ordered.

## 2019-07-25 NOTE — TELEPHONE ENCOUNTER
Controlled medication pending for review. If approved needs to be called in or faxed by on-site staff.     Requested Prescriptions     Pending Prescriptions Disp Refills   • DIAZEPAM 10 MG Oral Tab [Pharmacy Med Name: DIAZEPAM 10MG TABLETS] 60 tablet 0

## 2019-07-26 RX ORDER — DIAZEPAM 10 MG/1
TABLET ORAL
Qty: 60 TABLET | Refills: 0
Start: 2019-07-26

## 2019-07-26 NOTE — PROGRESS NOTES
[phone] Pat's phone number for post hospital follow up, NCM contact information provided.

## 2019-07-26 NOTE — PAYOR COMM NOTE
--------------  ADMISSION REVIEW     Johny iXong MA [facility]  Subscriber #:  [id number]  Authorization Number: [id number]    Admit date: 7/19/19  Admit time: 2010       Admitting Physician: Renetta Scott MD  Attending Physician:  No att. providers found  P • Sciatica    • Scoliosis    • Shortness of breath     last 2 weeks SOB d/t codeine per patient    • Sleep apnea    • Spinal stenosis    • Tendonitis    • Unspecified essential hypertension    • Ventricular aneurysm        Past Surgical History:   Procedur Eyes: Pupils are equal, round, and reactive to light. Conjunctivae and EOM are normal.   Neck: Normal range of motion. Neck supple. No JVD present. Cardiovascular: Normal rate, regular rhythm, normal heart sounds and intact distal pulses.    No murmur hea RDW 17.1 (*)     Lymphocyte Absolute 0.78 (*)     All other components within normal limits   TROPONIN I - Normal   CBC WITH DIFFERENTIAL WITH PLATELET    Narrative:      The following orders were created for panel order CBC WITH DIFFERENTIAL WITH PLATELET 7/19/2019  5:16 pm    Follow-up:  No follow-up provider specified. We recommend that you schedule follow up care with a primary care provider within the next three months to obtain basic health screening including reassessment of your blood pressure.     Lisseth Finch HISTORY OF PRESENT ILLNESS:  Patient is a 60-year-old  female with known past medical history of coronary artery disease, peripheral arterial disease, and chronic hypertension with possible hyperaldosteronism.   Today, she went to her primary care ALLERGIES:  Abilify causes side effects. Iodine causes kidney problems. Also, in record, MRI contrast allergy. Wellbutrin causes hallucinations. FAMILY HISTORY:  Mother had emphysema, diabetes, and bladder cancer. Father had mesothelioma.     SOCIAL 4.   Chronic renal insufficiency stage 3.  5.   Generalized osteoarthritis. PLAN:  Patient will be admitted to telemetry floor. IV Lasix. A 2D echocardiogram with Doppler. Cardiac diet. Restart spironolactone, and recheck her potassium.   She receive She currently has no symptoms, she is fairly active at home, walks with a walker however does all of her housework which includes cooking on her own.   She has not noticed any shortness of breath or chest pain with her usual activities.     Physical Exam: M • L5-S1 grade 1 unstable Spondylolisthesis with S1 high riding 10/2/2014   • L5-S1 mild-mod central, L3-4 right mild, L2-3 mild central bulging discs 12/8/2014   • Nephrolithiasis     • Osteoarthritis     • Other and unspecified hyperlipidemia     • Periph General: Alert and oriented x 3. No apparent distress. No respiratory or constitutional distress. HEENT: Normocephalic, anicteric sclera, neck supple. Neck: No JVD, carotids 2+, no bruits. Cardiac: Regular rate and rhythm.  S1, S2 normal. No murmur, dmitri Patient denied any significant cough or sputum  Feels better with oxygen  Denies any dyspnea at rest   Denied any chest pain  Denied any snoring or witnessed apnea  No abdominal pain or nausea vomiting or diarrhea  No lower extremity edema or pain or calf   Removal of kidney stone and stent placement   • TRANSFORAMINAL LUMBAR EPIDURAL STEROID INJECTION SINGLE LEVEL N/A 6/6/2018     Performed by Leesa Shirley MD at [address]   • WRIST FRACTURE SURGERY         left wrist         Family History spironolactone (ALDACTONE) tab 50 mg Oral BID   diazepam (VALIUM) tab 5 mg 5 mg Oral Q8H PRN        Prescriptions Prior to Admission      Medications Prior to Admission:  ATORVASTATIN 40 MG Oral Tab TAKE 1 TABLET BY MOUTH EVERY NIGHT   pregabalin 75 MG Ora   CA 9.2 07/22/2019     ALB 3.1 (L) 07/22/2019     ALKPHO 82 07/22/2019     TP 6.0 (L) 07/22/2019     AST 14 (L) 07/22/2019     ALT 19 07/22/2019     T4F 0.84 12/14/2018     TSH 0.99 12/14/2018     MG 2.1 07/22/2019     TROP <0.045 07/19/2019     B12 >1,50 Chart Review: Note Routing History     Routing history could not be found for this note. This is because the note has never been routed or because communication record creation was suppressed.      PLEASE FAX DAYS CERTIFIED AND NEXT REVIEW DATE

## 2019-07-29 ENCOUNTER — OFFICE VISIT (OUTPATIENT)
Dept: CARDIOLOGY CLINIC | Facility: HOSPITAL | Age: 79
End: 2019-07-29
Attending: INTERNAL MEDICINE
Payer: MEDICARE

## 2019-07-29 VITALS
WEIGHT: 150 LBS | BODY MASS INDEX: 28 KG/M2 | OXYGEN SATURATION: 98 % | SYSTOLIC BLOOD PRESSURE: 115 MMHG | DIASTOLIC BLOOD PRESSURE: 43 MMHG | HEART RATE: 60 BPM

## 2019-07-29 DIAGNOSIS — I50.9 HEART FAILURE, UNSPECIFIED (HCC): ICD-10-CM

## 2019-07-29 DIAGNOSIS — I50.33 ACUTE ON CHRONIC HEART FAILURE WITH PRESERVED EJECTION FRACTION (HCC): Primary | ICD-10-CM

## 2019-07-29 LAB
ANION GAP SERPL CALC-SCNC: 8 MMOL/L (ref 0–18)
BUN BLD-MCNC: 32 MG/DL (ref 7–18)
BUN/CREAT SERPL: 19.4 (ref 10–20)
CALCIUM BLD-MCNC: 10 MG/DL (ref 8.5–10.1)
CHLORIDE SERPL-SCNC: 109 MMOL/L (ref 98–112)
CHOLEST SERPL-MCNC: NORMAL MG/DL
CHOLEST/HDLC SERPL: NORMAL {RATIO}
CO2 SERPL-SCNC: 25 MMOL/L (ref 21–32)
CREAT BLD-MCNC: 1.65 MG/DL (ref 0.55–1.02)
GLUCOSE BLD-MCNC: 142 MG/DL (ref 70–99)
HDLC SERPL-MCNC: NORMAL MG/DL
LDLC SERPL CALC-MCNC: NORMAL MG/DL
LENGTH OF FAST TIME PATIENT: NORMAL H
NONHDLC SERPL-MCNC: NORMAL MG/DL
OSMOLALITY SERPL CALC.SUM OF ELEC: 303 MOSM/KG (ref 275–295)
PATIENT FASTING: NO
POTASSIUM SERPL-SCNC: 4.3 MMOL/L (ref 3.5–5.1)
SODIUM SERPL-SCNC: 142 MMOL/L (ref 136–145)
TRIGL SERPL-MCNC: NORMAL MG/DL
VLDLC SERPL CALC-MCNC: NORMAL MG/DL

## 2019-07-29 PROCEDURE — 99212 OFFICE O/P EST SF 10 MIN: CPT | Performed by: CLINICAL NURSE SPECIALIST

## 2019-07-29 PROCEDURE — 36415 COLL VENOUS BLD VENIPUNCTURE: CPT | Performed by: CLINICAL NURSE SPECIALIST

## 2019-07-29 PROCEDURE — 99214 OFFICE O/P EST MOD 30 MIN: CPT | Performed by: CLINICAL NURSE SPECIALIST

## 2019-07-29 PROCEDURE — 80048 BASIC METABOLIC PNL TOTAL CA: CPT | Performed by: CLINICAL NURSE SPECIALIST

## 2019-07-29 NOTE — PAYOR COMM NOTE
--------------  DISCHARGE REVIEW    Anam Mercer MA [facility] [place]  Subscriber #:  [id number]  Authorization Number: [id number]    Admit date: 7/19/19  Admit time:  2010  Discharge Date: 7/24/2019  5:35 PM     Admitting Physician: Leesa Tsai MD  Attending Phys ([facility]     Non compliance with medical treatment     Balance problem     Impaired activities of daily living     Uncontrolled hypertension     KOBY (acute kidney injury) ([facility]     Polypharmacy     Well controlled type 2 diabetes mellitus ([facility]     Smoker and IV Lasix. She will be admitted to the hospital for further management. Hospital Course:     #Acute hypoxemic respiratory failure  - ABG 7.47/38/44/27 on 4L NC on floor.  - Patient has > 50 pack-year smoking history.  Quit 1/2019.  - Patient declined known as:  APRESOLINE  What changed:    · medication strength  · See the new instructions. Take 1 tablet (100 mg total) by mouth every 8 (eight) hours.    Refills:  0     spironolactone 50 MG Tabs  Commonly known as:  ALDACTONE  What changed:    · how

## 2019-07-29 NOTE — PATIENT INSTRUCTIONS
Continue holding Labetalol     If you have not started taking Spironolactone, please do not start it yet. We may start this at the next clinic visit. Continue tracking daily weights. Call with weight gain of 3 lbs overnight or concerning symptoms.    35

## 2019-07-29 NOTE — PROGRESS NOTES
[address] Patient Status:  Outpatient    1940 MRN [id number]   Location MD Dr Precious Gallo Slice  Dr Alethea Bowling is a 66year old female who presents to i 07/22/2019 06:05 AM    BILT 0.4 07/22/2019 06:05 AM    TP 6.0 (L) 07/22/2019 06:05 AM    AST 14 (L) 07/22/2019 06:05 AM    ALT 19 07/22/2019 06:05 AM    T4F 0.84 12/14/2018 02:57 PM    TSH 0.99 12/14/2018 02:57 PM    MG 2.1 07/23/2019 04:58 PM    TROP <0.0 2-4 weeks     HTN  - Better controlled on current regime  - Is not taking labetalol as prescribed at discharge.  BP in clinic 110's, okay to continue holding.  - Lisinopril, amlodipine    CAD   - s/p Bare-metal stent 2016 to the LAD for non-ST elevation MI,

## 2019-07-30 NOTE — TELEPHONE ENCOUNTER
I left a detailed message on pt answering phone. Per Dr. Hilda Queen Refill should be done by psychiatrist).     DIAZEPAM 10MG TABLETS

## 2019-08-05 ENCOUNTER — OFFICE VISIT (OUTPATIENT)
Dept: CARDIOLOGY CLINIC | Facility: HOSPITAL | Age: 79
End: 2019-08-05
Attending: CLINICAL NURSE SPECIALIST
Payer: MEDICARE

## 2019-08-05 VITALS
SYSTOLIC BLOOD PRESSURE: 139 MMHG | HEART RATE: 51 BPM | BODY MASS INDEX: 27 KG/M2 | WEIGHT: 144 LBS | OXYGEN SATURATION: 97 % | DIASTOLIC BLOOD PRESSURE: 44 MMHG

## 2019-08-05 DIAGNOSIS — I50.32 CHRONIC HEART FAILURE WITH PRESERVED EJECTION FRACTION (HCC): Primary | ICD-10-CM

## 2019-08-05 DIAGNOSIS — I50.9 HEART FAILURE, UNSPECIFIED (HCC): ICD-10-CM

## 2019-08-05 PROBLEM — I16.9 HYPERTENSIVE CRISIS: Status: RESOLVED | Noted: 2018-06-01 | Resolved: 2019-08-05

## 2019-08-05 LAB
ALBUMIN SERPL-MCNC: NORMAL G/DL
ALBUMIN/GLOB SERPL: NORMAL {RATIO}
ALP SERPL-CCNC: NORMAL U/L
ALT SERPL-CCNC: NORMAL U/L
ANION GAP SERPL CALC-SCNC: 9 MMOL/L
ANION GAP SERPL CALC-SCNC: 9 MMOL/L (ref 0–18)
AST SERPL-CCNC: NORMAL U/L
BILIRUB SERPL-MCNC: NORMAL MG/DL
BUN BLD-MCNC: 30 MG/DL (ref 7–18)
BUN SERPL-MCNC: 30 MG/DL
BUN/CREAT SERPL: 20.3 (ref 10–20)
BUN/CREAT SERPL: NORMAL
CALCIUM BLD-MCNC: 9.5 MG/DL (ref 8.5–10.1)
CALCIUM SERPL-MCNC: 9.5 MG/DL
CHLORIDE SERPL-SCNC: 111 MMOL/L
CHLORIDE SERPL-SCNC: 111 MMOL/L (ref 98–112)
CO2 SERPL-SCNC: 24 MMOL/L
CO2 SERPL-SCNC: 24 MMOL/L (ref 21–32)
CREAT BLD-MCNC: 1.48 MG/DL (ref 0.55–1.02)
CREAT SERPL-MCNC: 1.48 MG/DL
GLOBULIN SER-MCNC: NORMAL G/DL
GLUCOSE BLD-MCNC: 106 MG/DL (ref 70–99)
GLUCOSE SERPL-MCNC: 106 MG/DL
HAV IGM SER QL: 2.1 MG/DL (ref 1.6–2.6)
LENGTH OF FAST TIME PATIENT: NO H
OSMOLALITY SERPL CALC.SUM OF ELEC: 305 MOSM/KG (ref 275–295)
PATIENT FASTING: NO
POTASSIUM SERPL-SCNC: 3.7 MMOL/L
POTASSIUM SERPL-SCNC: 3.7 MMOL/L (ref 3.5–5.1)
PROT SERPL-MCNC: NORMAL G/DL
SODIUM SERPL-SCNC: 144 MMOL/L
SODIUM SERPL-SCNC: 144 MMOL/L (ref 136–145)

## 2019-08-05 PROCEDURE — 99212 OFFICE O/P EST SF 10 MIN: CPT | Performed by: CLINICAL NURSE SPECIALIST

## 2019-08-05 PROCEDURE — 80048 BASIC METABOLIC PNL TOTAL CA: CPT | Performed by: CLINICAL NURSE SPECIALIST

## 2019-08-05 PROCEDURE — 36415 COLL VENOUS BLD VENIPUNCTURE: CPT | Performed by: CLINICAL NURSE SPECIALIST

## 2019-08-05 PROCEDURE — 83735 ASSAY OF MAGNESIUM: CPT | Performed by: CLINICAL NURSE SPECIALIST

## 2019-08-05 PROCEDURE — 99214 OFFICE O/P EST MOD 30 MIN: CPT | Performed by: CLINICAL NURSE SPECIALIST

## 2019-08-05 RX ORDER — LABETALOL 300 MG/1
300 TABLET, FILM COATED ORAL 2 TIMES DAILY
Refills: 0 | COMMUNITY
Start: 2019-08-05 | End: 2019-11-19

## 2019-08-05 NOTE — PATIENT INSTRUCTIONS
Continue current medications    Eat high potassium foods - handout given in clinic. Continue tracking daily weights. Call with weight gain of 3 lbs overnight or concerning symptoms.    [phone]    32-52 oz fluid restriction    Less than 2000 mg sodi

## 2019-08-05 NOTE — PROGRESS NOTES
[address] Patient Status:  Outpatient    1940 MRN [id number]   Location MD Dr Starla Sharpe Dr is a 78year old female who presents to i 01:29 PM     (H) 08/05/2019 01:29 PM    CA 9.5 08/05/2019 01:29 PM    ALB 3.1 (L) 07/22/2019 06:05 AM    ALKPHO 82 07/22/2019 06:05 AM    BILT 0.4 07/22/2019 06:05 AM    TP 6.0 (L) 07/22/2019 06:05 AM    AST 14 (L) 07/22/2019 06:05 AM    ALT 19 07/2 instructed to hold labetolol last clinic visit and continue hydralazine. Patient's sister did the opposite. BP/HR stable today in clinic.  Okay to continue labetolol and continue holding hydralazine.   - Lisinopril, amlodipine, labetolol     CAD   - s/p Bar

## 2019-08-08 ENCOUNTER — TELEPHONE (OUTPATIENT)
Dept: PULMONOLOGY | Facility: CLINIC | Age: 79
End: 2019-08-08

## 2019-08-08 RX ORDER — TORSEMIDE 20 MG/1
20 TABLET ORAL DAILY
Qty: 90 TABLET | Refills: 0 | Status: SHIPPED | OUTPATIENT
Start: 2019-08-08 | End: 2019-11-19

## 2019-08-08 RX ORDER — LISINOPRIL 10 MG/1
10 TABLET ORAL DAILY
Qty: 90 TABLET | Refills: 0 | Status: SHIPPED | OUTPATIENT
Start: 2019-08-08 | End: 2019-11-19

## 2019-08-08 NOTE — TELEPHONE ENCOUNTER
Spoke with pt, attempted to schedule appt. Pt requesting this RN to speak to sister Luísbartolo Abelelizabeth) to schedule appt, sister not available at this time. Informed pt to have sister call our office back to schedule appt. Pt verbalized understanding.

## 2019-08-08 NOTE — TELEPHONE ENCOUNTER
Sister requesting hospital follow up appt within a week. Sister states she can only bring pt in on Mondays.  Please call [phone]

## 2019-08-08 NOTE — TELEPHONE ENCOUNTER
Your Appointments    Monday August 26, 2019  2:30 PM CDT  [address] [facility] [address] with [facility] THE [facility] RM [address] ([address]) [address]  [phone]

## 2019-08-09 NOTE — TELEPHONE ENCOUNTER
Spoke with sister Junior Han. Appointment scheduled for pt on 8/12/19 at 12:15PM. Verified date, time, place, and where to park. Sister verbalized understanding.

## 2019-08-12 ENCOUNTER — OFFICE VISIT (OUTPATIENT)
Dept: PULMONOLOGY | Facility: CLINIC | Age: 79
End: 2019-08-12
Payer: MEDICARE

## 2019-08-12 ENCOUNTER — HOSPITAL ENCOUNTER (OUTPATIENT)
Dept: GENERAL RADIOLOGY | Facility: HOSPITAL | Age: 79
Discharge: HOME | End: 2019-08-12
Attending: INTERNAL MEDICINE
Payer: MEDICARE

## 2019-08-12 VITALS
WEIGHT: 144 LBS | HEART RATE: 49 BPM | DIASTOLIC BLOOD PRESSURE: 58 MMHG | SYSTOLIC BLOOD PRESSURE: 145 MMHG | BODY MASS INDEX: 27 KG/M2 | RESPIRATION RATE: 16 BRPM | OXYGEN SATURATION: 99 %

## 2019-08-12 DIAGNOSIS — Z87.891 FORMER SMOKER: ICD-10-CM

## 2019-08-12 DIAGNOSIS — I50.9 CHRONIC CONGESTIVE HEART FAILURE, UNSPECIFIED HEART FAILURE TYPE (HCC): ICD-10-CM

## 2019-08-12 DIAGNOSIS — J44.9 CHRONIC OBSTRUCTIVE PULMONARY DISEASE, UNSPECIFIED COPD TYPE (HCC): ICD-10-CM

## 2019-08-12 DIAGNOSIS — J44.9 CHRONIC OBSTRUCTIVE PULMONARY DISEASE, UNSPECIFIED COPD TYPE (HCC): Primary | ICD-10-CM

## 2019-08-12 PROCEDURE — 99214 OFFICE O/P EST MOD 30 MIN: CPT | Performed by: INTERNAL MEDICINE

## 2019-08-12 PROCEDURE — 71046 X-RAY EXAM CHEST 2 VIEWS: CPT | Performed by: INTERNAL MEDICINE

## 2019-08-12 PROCEDURE — 1111F DSCHRG MED/CURRENT MED MERGE: CPT | Performed by: INTERNAL MEDICINE

## 2019-08-12 RX ORDER — ALBUTEROL SULFATE 90 UG/1
2 AEROSOL, METERED RESPIRATORY (INHALATION) EVERY 6 HOURS PRN
Qty: 1 INHALER | Refills: 5 | Status: SHIPPED | OUTPATIENT
Start: 2019-08-12

## 2019-08-12 NOTE — PROGRESS NOTES
HPI:    Patient ID: Filemon Beach is a 78year old female.     HPI    Review of Systems         Current Outpatient Medications:  Albuterol Sulfate  (90 Base) MCG/ACT Inhalation Aero Soln Inhale 2 puffs into the lungs every 6 (six) hours as needed cardiology and CHF clinic  Clinically doing well and asymptomatic now  Follow-up chest x-ray to assure clearance of small basilar effusion    Order for today   CXR   Albuterol prn   F/u in 6 months         Meds This Visit:  Requested Prescriptions     Sign

## 2019-08-13 ENCOUNTER — OFFICE VISIT (OUTPATIENT)
Dept: INTERNAL MEDICINE CLINIC | Facility: CLINIC | Age: 79
End: 2019-08-13
Payer: MEDICARE

## 2019-08-13 VITALS
HEART RATE: 48 BPM | SYSTOLIC BLOOD PRESSURE: 157 MMHG | WEIGHT: 143 LBS | BODY MASS INDEX: 27 KG/M2 | DIASTOLIC BLOOD PRESSURE: 68 MMHG | TEMPERATURE: 99 F | HEIGHT: 61 IN

## 2019-08-13 DIAGNOSIS — E11.22 TYPE 2 DIABETES MELLITUS WITH STAGE 3 CHRONIC KIDNEY DISEASE, WITHOUT LONG-TERM CURRENT USE OF INSULIN (HCC): ICD-10-CM

## 2019-08-13 DIAGNOSIS — Z74.09 IMPAIRED MOBILITY: ICD-10-CM

## 2019-08-13 DIAGNOSIS — M48.061 SPINAL STENOSIS OF LUMBAR REGION, UNSPECIFIED WHETHER NEUROGENIC CLAUDICATION PRESENT: ICD-10-CM

## 2019-08-13 DIAGNOSIS — F17.200 SMOKER: ICD-10-CM

## 2019-08-13 DIAGNOSIS — I25.83 CORONARY ARTERY DISEASE DUE TO LIPID RICH PLAQUE: ICD-10-CM

## 2019-08-13 DIAGNOSIS — N18.30 TYPE 2 DIABETES MELLITUS WITH STAGE 3 CHRONIC KIDNEY DISEASE, WITHOUT LONG-TERM CURRENT USE OF INSULIN (HCC): ICD-10-CM

## 2019-08-13 DIAGNOSIS — I10 ESSENTIAL HYPERTENSION: Primary | ICD-10-CM

## 2019-08-13 DIAGNOSIS — M48.00 SPINAL STENOSIS, UNSPECIFIED SPINAL REGION: ICD-10-CM

## 2019-08-13 DIAGNOSIS — I25.10 CORONARY ARTERY DISEASE DUE TO LIPID RICH PLAQUE: ICD-10-CM

## 2019-08-13 DIAGNOSIS — I50.32 CHRONIC HEART FAILURE WITH PRESERVED EJECTION FRACTION (HCC): ICD-10-CM

## 2019-08-13 DIAGNOSIS — N18.30 CHRONIC KIDNEY DISEASE (CKD), STAGE III (MODERATE) (HCC): ICD-10-CM

## 2019-08-13 PROCEDURE — 99214 OFFICE O/P EST MOD 30 MIN: CPT | Performed by: INTERNAL MEDICINE

## 2019-08-13 PROCEDURE — 1111F DSCHRG MED/CURRENT MED MERGE: CPT | Performed by: INTERNAL MEDICINE

## 2019-08-13 RX ORDER — HYDRALAZINE HYDROCHLORIDE 100 MG/1
50 TABLET, FILM COATED ORAL 3 TIMES DAILY
COMMUNITY

## 2019-08-14 PROBLEM — D50.9 IRON DEFICIENCY ANEMIA: Status: ACTIVE | Noted: 2019-08-14

## 2019-08-14 PROBLEM — I25.10 CORONARY ARTERY DISEASE DUE TO LIPID RICH PLAQUE: Status: ACTIVE | Noted: 2019-08-14

## 2019-08-14 PROBLEM — E11.9 TYPE 2 DIABETES MELLITUS (CMD): Status: ACTIVE | Noted: 2019-08-14

## 2019-08-14 PROBLEM — I25.83 CORONARY ARTERY DISEASE DUE TO LIPID RICH PLAQUE: Status: ACTIVE | Noted: 2019-08-14

## 2019-08-14 PROBLEM — I73.9 PERIPHERAL VASCULAR DISEASE (CMD): Status: ACTIVE | Noted: 2019-08-14

## 2019-08-14 PROBLEM — R82.994 HYPERCALCIURIA: Status: ACTIVE | Noted: 2019-08-14

## 2019-08-14 PROBLEM — F31.9 BIPOLAR 1 DISORDER (CMD): Status: ACTIVE | Noted: 2019-08-14

## 2019-08-14 PROBLEM — N17.9 AKI (ACUTE KIDNEY INJURY) (CMD): Status: ACTIVE | Noted: 2019-08-14

## 2019-08-14 PROBLEM — F17.200 SMOKER: Status: ACTIVE | Noted: 2019-08-14

## 2019-08-14 PROBLEM — E78.00 HYPERCHOLESTEROLEMIA: Status: ACTIVE | Noted: 2019-08-14

## 2019-08-14 PROBLEM — M48.04 THORACIC SPINAL STENOSIS: Status: ACTIVE | Noted: 2019-08-14

## 2019-08-14 PROBLEM — N18.30 CHRONIC KIDNEY DISEASE (CKD), STAGE III (MODERATE) (CMD): Status: ACTIVE | Noted: 2019-08-14

## 2019-08-14 PROBLEM — M54.31 SCIATICA OF RIGHT SIDE: Status: ACTIVE | Noted: 2019-08-14

## 2019-08-14 PROBLEM — E87.6 HYPOKALEMIA: Status: ACTIVE | Noted: 2019-08-14

## 2019-08-14 PROBLEM — I67.1 CEREBRAL ANEURYSM: Status: ACTIVE | Noted: 2019-08-14

## 2019-08-14 PROBLEM — I10 HYPERTENSION: Status: ACTIVE | Noted: 2019-08-14

## 2019-08-14 RX ORDER — ASPIRIN 81 MG/1
81 TABLET ORAL DAILY
COMMUNITY

## 2019-08-14 RX ORDER — TORSEMIDE 20 MG/1
20 TABLET ORAL DAILY
COMMUNITY
Start: 2019-08-08 | End: 2019-11-06

## 2019-08-14 RX ORDER — AMLODIPINE BESYLATE 10 MG/1
10 TABLET ORAL DAILY
COMMUNITY
Start: 2018-06-08

## 2019-08-14 RX ORDER — ALBUTEROL SULFATE 90 UG/1
2 AEROSOL, METERED RESPIRATORY (INHALATION) EVERY 6 HOURS PRN
COMMUNITY
Start: 2019-08-12

## 2019-08-14 RX ORDER — HYDRALAZINE HYDROCHLORIDE 100 MG/1
100 TABLET, FILM COATED ORAL 3 TIMES DAILY
COMMUNITY

## 2019-08-14 RX ORDER — LABETALOL 300 MG/1
300 TABLET, FILM COATED ORAL 2 TIMES DAILY
COMMUNITY
Start: 2019-08-05 | End: 2019-08-15

## 2019-08-14 RX ORDER — ATORVASTATIN CALCIUM 40 MG/1
40 TABLET, FILM COATED ORAL DAILY
COMMUNITY
Start: 2019-06-25

## 2019-08-14 RX ORDER — PREGABALIN 75 MG/1
75 CAPSULE ORAL 2 TIMES DAILY
COMMUNITY
Start: 2019-01-07

## 2019-08-14 RX ORDER — LISINOPRIL 10 MG/1
10 TABLET ORAL DAILY
COMMUNITY
Start: 2019-08-08 | End: 2019-11-06

## 2019-08-14 RX ORDER — ACETAMINOPHEN 500 MG
500 TABLET ORAL EVERY 6 HOURS PRN
COMMUNITY

## 2019-08-15 ENCOUNTER — APPOINTMENT (OUTPATIENT)
Dept: CT IMAGING | Facility: HOSPITAL | Age: 79
End: 2019-08-15
Attending: EMERGENCY MEDICINE
Payer: MEDICARE

## 2019-08-15 ENCOUNTER — OFFICE VISIT (OUTPATIENT)
Dept: CARDIOLOGY | Age: 79
End: 2019-08-15

## 2019-08-15 ENCOUNTER — NURSE TRIAGE (OUTPATIENT)
Dept: OTHER | Age: 79
End: 2019-08-15

## 2019-08-15 ENCOUNTER — HOSPITAL ENCOUNTER (EMERGENCY)
Facility: HOSPITAL | Age: 79
Discharge: HOME | End: 2019-08-15
Attending: EMERGENCY MEDICINE
Payer: MEDICARE

## 2019-08-15 VITALS
OXYGEN SATURATION: 97 % | SYSTOLIC BLOOD PRESSURE: 158 MMHG | TEMPERATURE: 99 F | RESPIRATION RATE: 18 BRPM | HEIGHT: 60 IN | WEIGHT: 140 LBS | DIASTOLIC BLOOD PRESSURE: 56 MMHG | HEART RATE: 58 BPM | BODY MASS INDEX: 27.48 KG/M2

## 2019-08-15 VITALS
OXYGEN SATURATION: 98 % | WEIGHT: 142 LBS | SYSTOLIC BLOOD PRESSURE: 110 MMHG | HEART RATE: 54 BPM | BODY MASS INDEX: 27.88 KG/M2 | HEIGHT: 60 IN | DIASTOLIC BLOOD PRESSURE: 46 MMHG

## 2019-08-15 DIAGNOSIS — N30.00 ACUTE CYSTITIS WITHOUT HEMATURIA: Primary | ICD-10-CM

## 2019-08-15 DIAGNOSIS — E86.0 DEHYDRATION: ICD-10-CM

## 2019-08-15 DIAGNOSIS — N28.9 RENAL INSUFFICIENCY: ICD-10-CM

## 2019-08-15 DIAGNOSIS — I50.9 CONGESTIVE HEART FAILURE, UNSPECIFIED HF CHRONICITY, UNSPECIFIED HEART FAILURE TYPE (CMD): Primary | ICD-10-CM

## 2019-08-15 LAB
ANION GAP SERPL CALC-SCNC: 11 MMOL/L (ref 0–18)
BASOPHILS # BLD AUTO: 0.04 X10(3) UL (ref 0–0.2)
BASOPHILS NFR BLD AUTO: 0.8 %
BILIRUB UR QL: NEGATIVE
BUN BLD-MCNC: 27 MG/DL (ref 7–18)
BUN/CREAT SERPL: 15.7 (ref 10–20)
CALCIUM BLD-MCNC: 9.5 MG/DL (ref 8.5–10.1)
CHLORIDE SERPL-SCNC: 106 MMOL/L (ref 98–112)
CLARITY UR: CLEAR
CO2 SERPL-SCNC: 25 MMOL/L (ref 21–32)
COLOR UR: YELLOW
CREAT BLD-MCNC: 1.72 MG/DL (ref 0.55–1.02)
DEPRECATED RDW RBC AUTO: 47.8 FL (ref 35.1–46.3)
EOSINOPHIL # BLD AUTO: 0.2 X10(3) UL (ref 0–0.7)
EOSINOPHIL NFR BLD AUTO: 4.2 %
ERYTHROCYTE [DISTWIDTH] IN BLOOD BY AUTOMATED COUNT: 17.2 % (ref 11–15)
GLUCOSE BLD-MCNC: 128 MG/DL (ref 70–99)
GLUCOSE UR-MCNC: NEGATIVE MG/DL
HCT VFR BLD AUTO: 29.5 % (ref 35–48)
HGB BLD-MCNC: 9.2 G/DL (ref 12–16)
HGB UR QL STRIP.AUTO: NEGATIVE
HYALINE CASTS #/AREA URNS AUTO: 14 /LPF
IMM GRANULOCYTES # BLD AUTO: 0.01 X10(3) UL (ref 0–1)
IMM GRANULOCYTES NFR BLD: 0.2 %
KETONES UR-MCNC: NEGATIVE MG/DL
LYMPHOCYTES # BLD AUTO: 0.85 X10(3) UL (ref 1–4)
LYMPHOCYTES NFR BLD AUTO: 18 %
MCH RBC QN AUTO: 24.2 PG (ref 26–34)
MCHC RBC AUTO-ENTMCNC: 31.2 G/DL (ref 31–37)
MCV RBC AUTO: 77.6 FL (ref 80–100)
MONOCYTES # BLD AUTO: 0.39 X10(3) UL (ref 0.1–1)
MONOCYTES NFR BLD AUTO: 8.3 %
NEUTROPHILS # BLD AUTO: 3.23 X10 (3) UL (ref 1.5–7.7)
NEUTROPHILS # BLD AUTO: 3.23 X10(3) UL (ref 1.5–7.7)
NEUTROPHILS NFR BLD AUTO: 68.5 %
NITRITE UR QL STRIP.AUTO: NEGATIVE
OSMOLALITY SERPL CALC.SUM OF ELEC: 301 MOSM/KG (ref 275–295)
PH UR: 5 [PH] (ref 5–8)
PLATELET # BLD AUTO: 321 10(3)UL (ref 150–450)
POTASSIUM SERPL-SCNC: 3.2 MMOL/L (ref 3.5–5.1)
PROT UR-MCNC: NEGATIVE MG/DL
RBC # BLD AUTO: 3.8 X10(6)UL (ref 3.8–5.3)
RBC #/AREA URNS AUTO: 2 /HPF
SODIUM SERPL-SCNC: 142 MMOL/L (ref 136–145)
SP GR UR STRIP: 1.01 (ref 1–1.03)
UROBILINOGEN UR STRIP-ACNC: <2
VIT C UR-MCNC: NEGATIVE MG/DL
WBC # BLD AUTO: 4.7 X10(3) UL (ref 4–11)
WBC #/AREA URNS AUTO: 21 /HPF

## 2019-08-15 PROCEDURE — 99204 OFFICE O/P NEW MOD 45 MIN: CPT | Performed by: NURSE PRACTITIONER

## 2019-08-15 PROCEDURE — 85025 COMPLETE CBC W/AUTO DIFF WBC: CPT | Performed by: EMERGENCY MEDICINE

## 2019-08-15 PROCEDURE — 80048 BASIC METABOLIC PNL TOTAL CA: CPT | Performed by: EMERGENCY MEDICINE

## 2019-08-15 PROCEDURE — 87086 URINE CULTURE/COLONY COUNT: CPT | Performed by: EMERGENCY MEDICINE

## 2019-08-15 PROCEDURE — 81001 URINALYSIS AUTO W/SCOPE: CPT | Performed by: EMERGENCY MEDICINE

## 2019-08-15 PROCEDURE — 96375 TX/PRO/DX INJ NEW DRUG ADDON: CPT

## 2019-08-15 PROCEDURE — 74176 CT ABD & PELVIS W/O CONTRAST: CPT | Performed by: EMERGENCY MEDICINE

## 2019-08-15 PROCEDURE — 96365 THER/PROPH/DIAG IV INF INIT: CPT

## 2019-08-15 PROCEDURE — 99284 EMERGENCY DEPT VISIT MOD MDM: CPT

## 2019-08-15 PROCEDURE — 96361 HYDRATE IV INFUSION ADD-ON: CPT

## 2019-08-15 RX ORDER — HYDROCODONE BITARTRATE AND ACETAMINOPHEN 5; 325 MG/1; MG/1
1 TABLET ORAL EVERY 6 HOURS PRN
Qty: 10 TABLET | Refills: 0 | Status: SHIPPED | OUTPATIENT
Start: 2019-08-15 | End: 2019-08-22

## 2019-08-15 RX ORDER — POTASSIUM CHLORIDE 20 MEQ/1
40 TABLET, EXTENDED RELEASE ORAL ONCE
Status: COMPLETED | OUTPATIENT
Start: 2019-08-15 | End: 2019-08-15

## 2019-08-15 RX ORDER — MORPHINE SULFATE 4 MG/ML
2 INJECTION, SOLUTION INTRAMUSCULAR; INTRAVENOUS ONCE
Status: COMPLETED | OUTPATIENT
Start: 2019-08-15 | End: 2019-08-15

## 2019-08-15 RX ORDER — CEPHALEXIN 500 MG/1
500 CAPSULE ORAL 2 TIMES DAILY
Qty: 14 CAPSULE | Refills: 0 | Status: SHIPPED | OUTPATIENT
Start: 2019-08-15 | End: 2019-08-22

## 2019-08-15 SDOH — HEALTH STABILITY: MENTAL HEALTH: HOW OFTEN DO YOU HAVE A DRINK CONTAINING ALCOHOL?: NEVER

## 2019-08-15 NOTE — ED NOTES
Pt states has been unable to void since 0600 this morning. States has had urgency to void with no results x 3. H/o bladder cancer. Denies bladder distention, pain, n/v, fevers.

## 2019-08-15 NOTE — ED PROVIDER NOTES
Patient Seen in: [facility] AND [facility] Emergency Department    History   Patient presents with:  Urinary Symptoms (urologic)    Stated Complaint:     HPI    Patient presents the emergency department complaining of decreased urination since this morning.   She HYSTERECTOMY     • OTHER SURGICAL HISTORY  2004    Renal Artery Stent   • OTHER SURGICAL HISTORY  2009    Local resection - bladder   • OTHER SURGICAL HISTORY  2013    Removal of kidney stone and stent placement   • TRANSFORAMINAL LUMBAR EPIDURAL STEROID I place, and time. Skin: Skin is warm and dry. No rash noted. Nursing note and vitals reviewed.             ED Course     Labs Reviewed   BASIC METABOLIC PANEL (8) - Abnormal; Notable for the following components:       Result Value    Glucose 128 (*) 2.  Bilateral nonobstructing intrarenal calculi versus prominent intrarenal vascular calcifications. No ureteral stones or hydroureteronephrosis. 3.  Bilateral adrenal nodules likely representing adenomas. 4.  Cardiomegaly. Coronary atherosclerosis.  5.

## 2019-08-15 NOTE — ED INITIAL ASSESSMENT (HPI)
Pt reports urgency to void x 3 today but \"no urine is coming out.  Not even drops\" pt reports hx of bladder ca

## 2019-08-15 NOTE — TELEPHONE ENCOUNTER
Action Requested: Summary for Provider     []  Critical Lab, Recommendations Needed  [] Need Additional Advice  []   FYI    []   Need Orders  [] Need Medications Sent to Pharmacy  []  Other     SUMMARY:sent to ER  C/c unable to urinate since 6 AM-Pt agreed

## 2019-08-17 NOTE — TELEPHONE ENCOUNTER
Patient seen in [address] [place] [address] ER on 08/15    CSS please help patient schedule ER f/u with PCP. Thank you.

## 2019-08-17 NOTE — TELEPHONE ENCOUNTER
I am out of the office and will be back 8/22/19  Please direct  Acute calls to on call MD  As far as pain meds she is requestiong   Which one?

## 2019-08-17 NOTE — TELEPHONE ENCOUNTER
Please see refill TE for 08/17    Rosalia Bachelor  Em Rn Triage 1 hour ago (10:24 AM)      Pt contacted and stated she has a bladder infection and was given abx and pain medication.  She declines schedule ER f/u appt, but stated that she needed a refill on p

## 2019-08-17 NOTE — TELEPHONE ENCOUNTER
Patient is requesting refill of Hydrocodone. Please reference TE for refill of Norco on 08/17.  Request for medication routed to provider per protocol in that TE.

## 2019-08-22 ENCOUNTER — TELEPHONE (OUTPATIENT)
Dept: CASE MANAGEMENT | Age: 79
End: 2019-08-22

## 2019-08-22 NOTE — TELEPHONE ENCOUNTER
Patient is eligible for a 2019 Annual Medicare Health Assessment. Left message to call back [phone].

## 2019-08-22 NOTE — TELEPHONE ENCOUNTER
Please review; protocol failed.'    Reported as historical    Recent Visits  Date Type Provider Dept   08/13/19 Office Visit Sabrina Hardy MD [facility]   07/19/19 Office Visit Geraldine Ellis PA-C [facility]   12/14/18 Office Visit [facility]

## 2019-08-23 ENCOUNTER — OFFICE VISIT (OUTPATIENT)
Dept: INTERNAL MEDICINE CLINIC | Facility: CLINIC | Age: 79
End: 2019-08-23
Payer: MEDICARE

## 2019-08-23 ENCOUNTER — TELEPHONE (OUTPATIENT)
Dept: INTERNAL MEDICINE CLINIC | Facility: CLINIC | Age: 79
End: 2019-08-23

## 2019-08-23 VITALS
HEIGHT: 61 IN | TEMPERATURE: 98 F | SYSTOLIC BLOOD PRESSURE: 130 MMHG | OXYGEN SATURATION: 95 % | BODY MASS INDEX: 27.38 KG/M2 | WEIGHT: 145 LBS | DIASTOLIC BLOOD PRESSURE: 65 MMHG | HEART RATE: 53 BPM | RESPIRATION RATE: 17 BRPM

## 2019-08-23 DIAGNOSIS — C67.9 MALIGNANT NEOPLASM OF URINARY BLADDER, UNSPECIFIED SITE (HCC): ICD-10-CM

## 2019-08-23 DIAGNOSIS — R34 DECREASED URINE OUTPUT: Primary | ICD-10-CM

## 2019-08-23 DIAGNOSIS — M54.31 SCIATICA OF RIGHT SIDE: ICD-10-CM

## 2019-08-23 DIAGNOSIS — K59.00 CONSTIPATION, UNSPECIFIED CONSTIPATION TYPE: ICD-10-CM

## 2019-08-23 LAB
URINE BILIRUBIN: NEGATIVE
URINE BLOOD: NEGATIVE
URINE CLARITY: CLEAR
URINE COLOR: YELLOW
URINE GLUCOSE: NEGATIVE MG/DL
URINE KETONES: NEGATIVE MG/DL
URINE LEUKOCYTE ESTERASE: NEGATIVE
URINE NITRITE: NEGATIVE
URINE PH: 6
URINE PROTEIN: NEGATIVE MG /DL
URINE SPECIFIC GRAVITY: 1.01
URINE UROBILINOGEN: 0.2 MG/DL

## 2019-08-23 PROCEDURE — 99214 OFFICE O/P EST MOD 30 MIN: CPT | Performed by: PHYSICIAN ASSISTANT

## 2019-08-23 RX ORDER — AMLODIPINE BESYLATE 10 MG/1
TABLET ORAL
Qty: 30 TABLET | Refills: 0 | Status: SHIPPED | OUTPATIENT
Start: 2019-08-23 | End: 2019-09-19

## 2019-08-23 RX ORDER — HYDROCODONE BITARTRATE AND ACETAMINOPHEN 5; 325 MG/1; MG/1
1 TABLET ORAL EVERY 6 HOURS PRN
Qty: 10 TABLET | Refills: 0 | OUTPATIENT
Start: 2019-08-23 | End: 2019-08-30

## 2019-08-23 NOTE — PROGRESS NOTES
HPI:    Patient ID: Kuldip Elam is a 78year old female. HPI   Patient presents complaining of decreased urinary output. Was seen in the hospital for the same symptoms on 8/15. Was treated for UTI.   States that the symptoms have returned where sh Disp:  Rfl:    aspirin 81 MG Oral Tab EC Take 81 mg by mouth daily.  aspirin 81 mg effervescent tablet Disp:  Rfl:      Allergies:  Abilify [Aripiprazo*        Comment:drooling  Iodine [Radiology C*    UNKNOWN    Comment:Kidney problems  Silicone Performed by Juani Amaya MD at [address] [facility] ENDOSCOPY   • HYSTERECTOMY     • OTHER SURGICAL HISTORY  2004    Renal Artery Stent   • OTHER SURGICAL HISTORY  2009    Local resection - bladder   • OTHER SURGICAL HISTORY  2013    Removal of kidney stone and stent plac Decreased urine output  -UA in office was normal.  Advised patient to follow-up with urology as she does have a history of bladder cancer as well. - UROLOGY - INTERNAL    2.  Malignant neoplasm of urinary bladder, unspecified site [facility])  -Referral given in

## 2019-08-23 NOTE — TELEPHONE ENCOUNTER
Spoke with patient--was seen in ER 8/15/19 for cystitis--finished Cephalexin yesterday as prescribed by ER. Patient reports \"the same symptoms, all over again last night. I woke up at 1:30 a.m. And I couldn't pee.  I took a Diazepam and went back to bed

## 2019-08-24 NOTE — PROGRESS NOTES
HPI:    Patient ID: Luther De La Cruz is a 78year old female.     HPI     Follow-up as patient was in the hospital with congestive heart failure and hypokalemia  Offhand I reviewed cardiology notes  Pt is feeling well today  Back to baseline  She chronc low Please make hospital follow up with Dr Starla Burns (cardiologist) [phone]  Please make hospital follow up with Dr Yenni Mehta (pulmonologist) [phone]     Return to clinic 8/26/2019    Date of Admission: 7/19/2019                     Date of Discharge Psychiatric/Behavioral: Negative for agitation and behavioral problems. Current Outpatient Medications:  hydrALAZINE HCl 100 MG Oral Tab Take 100 mg by mouth 2 (two) times daily.  Disp:  Rfl:    Albuterol Sulfate  (90 Base) MCG/ACT Inhala Osteoarthritis    • Other and unspecified hyperlipidemia    • Peripheral vascular disease ([facility]     stents in left iliac and left femoral artery and both proximal renal arteries   • Renal artery stenosis (HCC)    • Sciatica    • Scoliosis    • Shortness of ear normal.   Left Ear: External ear normal.   Nose: Nose normal.   Mouth/Throat: Oropharynx is clear and moist. No oropharyngeal exudate. Eyes: Pupils are equal, round, and reactive to light.  Conjunctivae and EOM are normal. Right eye exhibits no discha preserved ejection fraction (HCC)  Plan: stable    (Z74.09) Impaired mobility  Plan: fall precauiton    (N18.3) Chronic kidney disease (CKD), stage III (moderate) ([facility])  Plan: monitor  stable    (E11.22,  N18.3) Type 2 diabetes mellitus with stage 3 chroni

## 2019-08-30 ENCOUNTER — MA CHART PREP (OUTPATIENT)
Dept: FAMILY MEDICINE CLINIC | Facility: CLINIC | Age: 79
End: 2019-08-30

## 2019-08-30 PROBLEM — I70.0 ATHEROSCLEROSIS OF AORTA (HCC): Status: ACTIVE | Noted: 2019-08-30

## 2019-08-30 PROBLEM — I77.1 TORTUOUS AORTA (HCC): Status: ACTIVE | Noted: 2019-08-30

## 2019-09-19 RX ORDER — AMLODIPINE BESYLATE 10 MG/1
TABLET ORAL
Qty: 90 TABLET | Refills: 1 | Status: ON HOLD | OUTPATIENT
Start: 2019-09-19 | End: 2019-11-23

## 2019-09-19 NOTE — TELEPHONE ENCOUNTER
No Seven. RN=call patient and verify if she still taking Labetolol, was put on hold by CHF specialist.    Refill passed per [facility] protocol.     Hypertensive Medications  Protocol Criteria:  · Appointment scheduled in the past 6 months or in the n

## 2019-09-26 RX ORDER — LABETALOL 300 MG/1
TABLET, FILM COATED ORAL
Qty: 180 TABLET | Refills: 1 | Status: SHIPPED | OUTPATIENT
Start: 2019-09-26 | End: 2019-11-19

## 2019-10-21 ENCOUNTER — APPOINTMENT (OUTPATIENT)
Dept: CARDIOLOGY | Age: 79
End: 2019-10-21

## 2019-11-13 ENCOUNTER — TELEPHONE (OUTPATIENT)
Dept: CASE MANAGEMENT | Age: 79
End: 2019-11-13

## 2019-11-13 NOTE — TELEPHONE ENCOUNTER
Pt is eligible for 2019 Medicare Annual Health Assessment visit. Left message to call back [phone]. Rayshawn Caraballo

## 2019-11-14 RX ORDER — LISINOPRIL 10 MG/1
TABLET ORAL
Qty: 90 TABLET | Refills: 0 | OUTPATIENT
Start: 2019-11-14

## 2019-11-14 RX ORDER — TORSEMIDE 20 MG/1
TABLET ORAL
Qty: 90 TABLET | Refills: 0 | OUTPATIENT
Start: 2019-11-14

## 2019-11-19 ENCOUNTER — HOSPITAL ENCOUNTER (OUTPATIENT)
Facility: HOSPITAL | Age: 79
Setting detail: OBSERVATION
Discharge: HOME | End: 2019-11-23
Attending: EMERGENCY MEDICINE | Admitting: HOSPITALIST
Payer: MEDICARE

## 2019-11-19 ENCOUNTER — LAB ENCOUNTER (OUTPATIENT)
Dept: LAB | Age: 79
End: 2019-11-19
Attending: PHYSICIAN ASSISTANT
Payer: MEDICARE

## 2019-11-19 ENCOUNTER — OFFICE VISIT (OUTPATIENT)
Dept: INTERNAL MEDICINE CLINIC | Facility: CLINIC | Age: 79
End: 2019-11-19
Payer: MEDICARE

## 2019-11-19 ENCOUNTER — TELEPHONE (OUTPATIENT)
Dept: INTERNAL MEDICINE CLINIC | Facility: CLINIC | Age: 79
End: 2019-11-19

## 2019-11-19 VITALS
HEIGHT: 61 IN | BODY MASS INDEX: 27.66 KG/M2 | SYSTOLIC BLOOD PRESSURE: 158 MMHG | OXYGEN SATURATION: 99 % | WEIGHT: 146.5 LBS | DIASTOLIC BLOOD PRESSURE: 76 MMHG | HEART RATE: 80 BPM

## 2019-11-19 DIAGNOSIS — I10 ESSENTIAL HYPERTENSION: ICD-10-CM

## 2019-11-19 DIAGNOSIS — D50.8 OTHER IRON DEFICIENCY ANEMIA: ICD-10-CM

## 2019-11-19 DIAGNOSIS — M54.31 SCIATICA OF RIGHT SIDE: ICD-10-CM

## 2019-11-19 DIAGNOSIS — M43.16 SPONDYLOLISTHESIS OF LUMBAR REGION: ICD-10-CM

## 2019-11-19 DIAGNOSIS — N17.9 AKI (ACUTE KIDNEY INJURY) (HCC): ICD-10-CM

## 2019-11-19 DIAGNOSIS — E87.6 HYPOKALEMIA: ICD-10-CM

## 2019-11-19 DIAGNOSIS — E11.9 WELL CONTROLLED TYPE 2 DIABETES MELLITUS (HCC): ICD-10-CM

## 2019-11-19 DIAGNOSIS — D64.9 NORMOCYTIC ANEMIA: Primary | ICD-10-CM

## 2019-11-19 DIAGNOSIS — E11.9 WELL CONTROLLED TYPE 2 DIABETES MELLITUS (HCC): Primary | ICD-10-CM

## 2019-11-19 PROBLEM — D50.9 MICROCYTIC ANEMIA: Status: ACTIVE | Noted: 2019-11-19

## 2019-11-19 PROCEDURE — 30233H1 TRANSFUSION OF NONAUTOLOGOUS WHOLE BLOOD INTO PERIPHERAL VEIN, PERCUTANEOUS APPROACH: ICD-10-PCS | Performed by: INTERNAL MEDICINE

## 2019-11-19 PROCEDURE — 80053 COMPREHEN METABOLIC PANEL: CPT

## 2019-11-19 PROCEDURE — 99220 INITIAL OBSERVATION CARE,LEVL III: CPT | Performed by: HOSPITALIST

## 2019-11-19 PROCEDURE — 36415 COLL VENOUS BLD VENIPUNCTURE: CPT

## 2019-11-19 PROCEDURE — 99213 OFFICE O/P EST LOW 20 MIN: CPT | Performed by: PHYSICIAN ASSISTANT

## 2019-11-19 PROCEDURE — 85025 COMPLETE CBC W/AUTO DIFF WBC: CPT

## 2019-11-19 RX ORDER — ACETAMINOPHEN 500 MG
500 TABLET ORAL EVERY 6 HOURS PRN
Status: DISCONTINUED | OUTPATIENT
Start: 2019-11-19 | End: 2019-11-23

## 2019-11-19 RX ORDER — FLUOXETINE HYDROCHLORIDE 20 MG/1
20 CAPSULE ORAL DAILY
Status: DISCONTINUED | OUTPATIENT
Start: 2019-11-19 | End: 2019-11-23

## 2019-11-19 RX ORDER — ASPIRIN 81 MG/1
81 TABLET ORAL DAILY
Status: DISCONTINUED | OUTPATIENT
Start: 2019-11-19 | End: 2019-11-23

## 2019-11-19 RX ORDER — LISINOPRIL 10 MG/1
10 TABLET ORAL DAILY
Status: DISCONTINUED | OUTPATIENT
Start: 2019-11-19 | End: 2019-11-22

## 2019-11-19 RX ORDER — DIAZEPAM 10 MG/1
10 TABLET ORAL NIGHTLY
COMMUNITY
End: 2020-01-01

## 2019-11-19 RX ORDER — ACETAMINOPHEN 325 MG/1
650 TABLET ORAL ONCE
Status: CANCELLED | OUTPATIENT
Start: 2019-11-19 | End: 2019-11-19

## 2019-11-19 RX ORDER — DIPHENHYDRAMINE HCL 25 MG
25 CAPSULE ORAL ONCE
Status: CANCELLED | OUTPATIENT
Start: 2019-11-19 | End: 2019-11-19

## 2019-11-19 RX ORDER — HEPARIN SODIUM 5000 [USP'U]/ML
5000 INJECTION, SOLUTION INTRAVENOUS; SUBCUTANEOUS EVERY 12 HOURS SCHEDULED
Status: DISCONTINUED | OUTPATIENT
Start: 2019-11-19 | End: 2019-11-23

## 2019-11-19 RX ORDER — ACETAMINOPHEN 325 MG/1
650 TABLET ORAL EVERY 6 HOURS PRN
Status: DISCONTINUED | OUTPATIENT
Start: 2019-11-19 | End: 2019-11-23

## 2019-11-19 RX ORDER — FLUOXETINE HYDROCHLORIDE 20 MG/1
20 CAPSULE ORAL DAILY
COMMUNITY

## 2019-11-19 RX ORDER — OLANZAPINE 5 MG/1
5 TABLET, ORALLY DISINTEGRATING ORAL NIGHTLY
Status: DISCONTINUED | OUTPATIENT
Start: 2019-11-19 | End: 2019-11-23

## 2019-11-19 RX ORDER — POTASSIUM CHLORIDE 20 MEQ/1
40 TABLET, EXTENDED RELEASE ORAL ONCE
Status: COMPLETED | OUTPATIENT
Start: 2019-11-19 | End: 2019-11-19

## 2019-11-19 RX ORDER — DOCUSATE SODIUM 100 MG/1
100 CAPSULE, LIQUID FILLED ORAL DAILY
Status: DISCONTINUED | OUTPATIENT
Start: 2019-11-20 | End: 2019-11-23

## 2019-11-19 RX ORDER — TORSEMIDE 20 MG/1
20 TABLET ORAL DAILY
Qty: 90 TABLET | Refills: 0 | Status: ON HOLD | OUTPATIENT
Start: 2019-11-19 | End: 2019-11-23

## 2019-11-19 RX ORDER — ATORVASTATIN CALCIUM 40 MG/1
40 TABLET, FILM COATED ORAL DAILY
Status: DISCONTINUED | OUTPATIENT
Start: 2019-11-20 | End: 2019-11-23

## 2019-11-19 RX ORDER — SODIUM CHLORIDE 0.9 % (FLUSH) 0.9 %
3 SYRINGE (ML) INJECTION AS NEEDED
Status: DISCONTINUED | OUTPATIENT
Start: 2019-11-19 | End: 2019-11-23

## 2019-11-19 RX ORDER — ATORVASTATIN CALCIUM 40 MG/1
TABLET, FILM COATED ORAL
Qty: 90 TABLET | Refills: 1 | Status: SHIPPED | OUTPATIENT
Start: 2019-11-19 | End: 2020-05-30

## 2019-11-19 RX ORDER — LABETALOL 300 MG/1
TABLET, FILM COATED ORAL
Qty: 180 TABLET | Refills: 1 | Status: SHIPPED | OUTPATIENT
Start: 2019-11-19 | End: 2020-05-30

## 2019-11-19 RX ORDER — SODIUM CHLORIDE 9 MG/ML
INJECTION, SOLUTION INTRAVENOUS ONCE
Status: CANCELLED | OUTPATIENT
Start: 2019-11-19 | End: 2019-11-19

## 2019-11-19 RX ORDER — ONDANSETRON 2 MG/ML
4 INJECTION INTRAMUSCULAR; INTRAVENOUS EVERY 6 HOURS PRN
Status: DISCONTINUED | OUTPATIENT
Start: 2019-11-19 | End: 2019-11-23

## 2019-11-19 RX ORDER — OLANZAPINE 5 MG/1
5 TABLET, ORALLY DISINTEGRATING ORAL NIGHTLY
COMMUNITY

## 2019-11-19 RX ORDER — HYDRALAZINE HYDROCHLORIDE 50 MG/1
50 TABLET, FILM COATED ORAL 3 TIMES DAILY
Status: DISCONTINUED | OUTPATIENT
Start: 2019-11-19 | End: 2019-11-23

## 2019-11-19 RX ORDER — MELATONIN
325 2 TIMES DAILY WITH MEALS
Status: DISCONTINUED | OUTPATIENT
Start: 2019-11-19 | End: 2019-11-23

## 2019-11-19 RX ORDER — TEMAZEPAM 15 MG/1
15 CAPSULE ORAL NIGHTLY PRN
Status: DISCONTINUED | OUTPATIENT
Start: 2019-11-19 | End: 2019-11-19

## 2019-11-19 RX ORDER — DIAZEPAM 10 MG/1
10 TABLET ORAL NIGHTLY
Status: DISCONTINUED | OUTPATIENT
Start: 2019-11-19 | End: 2019-11-23

## 2019-11-19 RX ORDER — LISINOPRIL 10 MG/1
10 TABLET ORAL DAILY
Qty: 90 TABLET | Refills: 1 | Status: SHIPPED | OUTPATIENT
Start: 2019-11-19 | End: 2020-01-01

## 2019-11-19 RX ORDER — TORSEMIDE 20 MG/1
20 TABLET ORAL DAILY
Status: DISCONTINUED | OUTPATIENT
Start: 2019-11-19 | End: 2019-11-22

## 2019-11-19 RX ORDER — ALBUTEROL SULFATE 90 UG/1
2 AEROSOL, METERED RESPIRATORY (INHALATION) EVERY 6 HOURS PRN
Status: DISCONTINUED | OUTPATIENT
Start: 2019-11-19 | End: 2019-11-23

## 2019-11-19 NOTE — PROGRESS NOTES
HPI:    Patient ID: Sonido Carmen is a 78year old female. HPI   Patient presents for a follow-up of hypertension and her back pain. Patient also like to go over all her medication. History of acute kidney injury. States to be doing well.   Did veronique aspirin 81 MG Oral Tab EC Take 81 mg by mouth daily.  aspirin 81 mg effervescent tablet       Allergies:  Abilify [Aripiprazo*        Comment:drooling  Iodine [Radiology C*    UNKNOWN    Comment:Kidney problems  Silicone                UNKNOWN  Wellbutrin [ at [address] [facility] ENDOSCOPY   • HYSTERECTOMY     • OTHER SURGICAL HISTORY  2004    Renal Artery Stent   • OTHER SURGICAL HISTORY  2009    Local resection - bladder   • OTHER SURGICAL HISTORY  2013    Removal of kidney stone and stent placement   • TRANSFORAMINAL LUMB PANEL (14); Future  - URINALYSIS, ROUTINE; Future    2. KOBY (acute kidney injury) ([facility]  - COMP METABOLIC PANEL (14); Future  - URINALYSIS, ROUTINE; Future    3. Other iron deficiency anemia  -CBC was ordered due to history of anemia.   CBC did return with

## 2019-11-19 NOTE — TELEPHONE ENCOUNTER
Also called son's phone number and left a voicemail. Advising that he contact mom and take her to the ER.

## 2019-11-19 NOTE — TELEPHONE ENCOUNTER
Called sister. Patient has a new phone number [phone]. Spoke to patient. Patient aware of results. Patient requesting that an ambulance be called. Called 911 and ambulance was dispatched and on their way. Patient aware.

## 2019-11-19 NOTE — TELEPHONE ENCOUNTER
Evita from reference lab called with a critical potassium level of 2.6.     Notes recorded by Jann Vernon PA-C on 11/19/2019 at 5:21 PM CST  Patient's hemoglobin is significantly low as well as her potassium level is low.  Patient needs to be contacted a

## 2019-11-19 NOTE — TELEPHONE ENCOUNTER
I tried calling patient twice and left a voicemail. Also tried contacting patient's sister his numbers on file. No answer.   Please try to get a hold of the patient and advised her that she needs to go to the ER to have her CBC rechecked and receive more

## 2019-11-19 NOTE — TELEPHONE ENCOUNTER
Received call from [facility] in lab stating Hemoglobin lab is showing a value of 6.6.    [facility] message sent to Carol Lala PA-C.

## 2019-11-20 PROCEDURE — 99220 INITIAL OBSERVATION CARE,LEVL III: CPT | Performed by: HOSPITALIST

## 2019-11-20 RX ORDER — HYDROMORPHONE HYDROCHLORIDE 1 MG/ML
0.5 INJECTION, SOLUTION INTRAMUSCULAR; INTRAVENOUS; SUBCUTANEOUS EVERY 2 HOUR PRN
Status: DISCONTINUED | OUTPATIENT
Start: 2019-11-20 | End: 2019-11-23

## 2019-11-20 RX ORDER — POTASSIUM CHLORIDE 20 MEQ/1
40 TABLET, EXTENDED RELEASE ORAL EVERY 4 HOURS
Status: COMPLETED | OUTPATIENT
Start: 2019-11-20 | End: 2019-11-20

## 2019-11-20 RX ORDER — MAGNESIUM OXIDE 400 MG (241.3 MG MAGNESIUM) TABLET
400 TABLET ONCE
Status: COMPLETED | OUTPATIENT
Start: 2019-11-20 | End: 2019-11-20

## 2019-11-20 RX ORDER — CYANOCOBALAMIN 1000 UG/ML
1000 INJECTION INTRAMUSCULAR; SUBCUTANEOUS ONCE
Status: COMPLETED | OUTPATIENT
Start: 2019-11-20 | End: 2019-11-20

## 2019-11-20 NOTE — H&P
[facility]    PATIENT'S NAME: Abisai Hobbspodesean WELLS   ATTENDING PHYSICIAN: Earline Motta MD   PATIENT ACCOUNT#:   [id number]    LOCATION:  [place] [address]  MEDICAL RECORD #:   [id number]       YOB: 1940  ADMISSION DATE:       11/19 low-dose aspirin currently. ALLERGIES:  Abilify causes side effects. Iodine causes kidney problems. Also mentioned she is allergic to MRI contrast.  Wellbutrin causes hallucinations.       FAMILY HISTORY:  Mother had emphysema, diabetes, and bladder related to noncompliance with iron supplementation. Considering her history of coronary artery disease and her diastolic heart failure, she will need a blood transfusion. 2.   Profound hypokalemia, possibly related to her diuretic.     3.   Chronic katie

## 2019-11-20 NOTE — CHRONIC PAIN
[facility] HOSP - [facility]  Report of Consultation    Cathy Wolfe Patient Status:  Observation    1940 MRN [id number]   Location [facility] Attending Mustapha Lugo MD   Hosp Day # 0 PCP Paolo Hampton MD     Date of Admission:  1 Shortness of breath     last 2 weeks SOB d/t codeine per patient    • Sleep apnea    • Spinal stenosis    • Tendonitis    • Unspecified essential hypertension    • Ventricular aneurysm      Past Surgical History:   Procedure Laterality Date   • BRAIN SURGE PRN  •  Heparin Sodium (Porcine) 5000 UNIT/ML injection 5,000 Units, 5,000 Units, Subcutaneous, 2 times per day  •  acetaminophen (TYLENOL) tab 650 mg, 650 mg, Oral, Q6H PRN  •  ondansetron HCl (ZOFRAN) injection 4 mg, 4 mg, Intravenous, Q6H PRN  •  ferrou reviewed    Impression:  Patient Active Problem List:     Peripheral vascular disease ([address]     Hypercholesterolemia     Cerebral aneurysm, nonruptured     right L2 radiculitis and right L5-S1 radiculitis     L5-S1 grade 1 unstable Spondylolisthesis with S further testing. Risks and benefits of all options were discussed at length to patients satisfaction during a comprehensive interactive discussion. All questions were answered during extended questions and answer session.  Patient agreeable to discussion pl

## 2019-11-20 NOTE — ED INITIAL ASSESSMENT (HPI)
Pt BIBEMS d/t abnormal labs - K+ and Hgb. Told by MD to come to ED. Pt unsure what labs numbers are.    PMH HTN, HL, bipolar  AOx3, RR even/nonlabored

## 2019-11-20 NOTE — HOME CARE LIAISON
Received referral from Mirtha Willson for residential home health. Met with patient at the bedside. Patient is agreeable to Residential Home Health services upon discharge. Patient given brochure and liaison card. All questions and concerns were addressed.  Will c

## 2019-11-20 NOTE — CM/SW NOTE
ELLA intern received MDO for advanced directives. ELLA intern met with pt to discuss possible dc needs. Pt lives in a 1 level condo, the building has an elevator so she does not use any stairs.  Pt has no familial support; she does not stay in contact with

## 2019-11-20 NOTE — ED PROVIDER NOTES
Patient Seen in: [facility] AND [facility] Emergency Department    History   Patient presents with:  Abnormal Labs    Stated Complaint: abnormal labs     HPI    58-year-old female with past medical history of CAD status post PCI, hypertension, dyslipidemia, renal HISTORY  2004    Renal Artery Stent   • OTHER SURGICAL HISTORY  2009    Local resection - bladder   • OTHER SURGICAL HISTORY  2013    Removal of kidney stone and stent placement   • TRANSFORAMINAL LUMBAR EPIDURAL STEROID INJECTION SINGLE LEVEL N/A 6/6/2018 Years: 50.00        Pack years: 48        Types: Cigarettes        Quit date: 2019        Years since quittin.8      Smokeless tobacco: Never Used      Tobacco comment: patient reports smoking 5 cigarettes a day since     Alcohol use:  No order CBC WITH DIFFERENTIAL WITH PLATELET.   Procedure                               Abnormality         Status                     ---------                               -----------         ------                     CBC W/ DIFFERENTIAL[[id number]]

## 2019-11-20 NOTE — CONSULTS
[facility] HOSP - [facility]    Report of Consultation    Sylvia Munoz Patient Status:  Observation    1940 MRN [id number]   Location [facility] Attending Eliseo Hendricks MD   Hosp Day # 0 PCP Vignesh Blackman MD     Date of Admission: Ventricular aneurysm        Past Surgical History  Past Surgical History:   Procedure Laterality Date   • BRAIN SURGERY      7 years ago    • CARPAL TUNNEL RELEASE     • COLONOSCOPY N/A 11/26/2016    Performed by Jany Joshua MD at [facility] ENDOSCOPY   • [facility] sulfate EC tab 325 mg, 325 mg, Oral, BID with meals  docusate sodium (COLACE) cap 100 mg, 100 mg, Oral, Daily  acetaminophen (TYLENOL EXTRA STRENGTH) tab 500 mg, 500 mg, Oral, Q6H PRN  Albuterol Sulfate  (90 Base) MCG/ACT inhaler 2 puff, 2 puff, Inh [Radiology C*    UNKNOWN    Comment:Kidney problems  Silicone                UNKNOWN  Wellbutrin [Bupropi*    HALLUCINATION    Review of Systems:   GENERAL HEALTH: feels well otherwise, denies fever or weight loss  SKIN: denies any unusual skin lesions or colonoscopy in 2016 noted gastritis, duodenitis, diverticulosis, and hemorrhoids. Patient believes VCE was done but do not see record of this.  Iron studies c/w NEGRITO    -Will plan to repeat EGD and colonoscopy given progressed NEGRITO  -Clear liquid diet, Golyte

## 2019-11-20 NOTE — PROGRESS NOTES
[facility]    Progress Note    Taylor White Patient Status:  Observation    1940 MRN [id number]   Location [facility]5W Attending Juwan Baeza MD   Hosp Day # 0 PCP Shayy Olvera MD       Subjective:   Nhung Del Rio T mg Oral Daily   • diazepam  10 mg Oral Nightly   • FLUoxetine HCl  20 mg Oral Daily   • hydrALAZINE HCl  50 mg Oral TID   • Labetalol HCl  300 mg Oral 2 times per day   • lisinopril  10 mg Oral Daily   • OLANZapine  5 mg Oral Nightly   • torsemide  20 mg O Plan:   1. Microcytic anemia, no evidence of active blood loss.   -transfused 1 unit PRBC  -venofer given today  -cont PO iron  -check B12  -GI evaluation    2.        Profound hypokalemia, possibly related to her diuretic.    -has used spironolactone

## 2019-11-21 PROCEDURE — 99226 SUBSEQUENT OBSERVATION CARE: CPT | Performed by: HOSPITALIST

## 2019-11-21 RX ORDER — HYDROCODONE BITARTRATE AND ACETAMINOPHEN 5; 325 MG/1; MG/1
1 TABLET ORAL EVERY 6 HOURS PRN
Status: DISCONTINUED | OUTPATIENT
Start: 2019-11-21 | End: 2019-11-23

## 2019-11-21 NOTE — PROGRESS NOTES
Patient was calm and spoke with  about her support and jose in God.  was present with active listening and prayer.  Patient shared with  her pain level and her concern for her son and granddaughter      11/21/19 [address] [place] [address] Drive

## 2019-11-21 NOTE — PROGRESS NOTES
[facility] AND [facility]  Gastroenterology Progress Note    Adamani Like Patient Status:  Observation    1940 MRN [id number]   Location [facility]5W Attending Dari Lino MD   Hosp Day # 0 PCP Sharri Wetzel MD     Subjective:  Theodosia Bumpers anemia     Chronic heart failure with preserved ejection fraction (HCC)     Atherosclerosis of aorta (HCC)     Tortuous aorta (HCC)     Normocytic anemia     Microcytic anemia      Assessment/Plan:  Ms Bro Garcia is a 77 y/o female with hx of CAD s/p PCI, HTN,

## 2019-11-22 ENCOUNTER — ANESTHESIA EVENT (OUTPATIENT)
Dept: ENDOSCOPY | Facility: HOSPITAL | Age: 79
End: 2019-11-22
Payer: MEDICARE

## 2019-11-22 ENCOUNTER — ANESTHESIA (OUTPATIENT)
Dept: ENDOSCOPY | Facility: HOSPITAL | Age: 79
End: 2019-11-22
Payer: MEDICARE

## 2019-11-22 PROCEDURE — 0DB68ZX EXCISION OF STOMACH, VIA NATURAL OR ARTIFICIAL OPENING ENDOSCOPIC, DIAGNOSTIC: ICD-10-PCS | Performed by: INTERNAL MEDICINE

## 2019-11-22 PROCEDURE — 0DJD8ZZ INSPECTION OF LOWER INTESTINAL TRACT, VIA NATURAL OR ARTIFICIAL OPENING ENDOSCOPIC: ICD-10-PCS | Performed by: INTERNAL MEDICINE

## 2019-11-22 PROCEDURE — 99226 SUBSEQUENT OBSERVATION CARE: CPT | Performed by: HOSPITALIST

## 2019-11-22 PROCEDURE — 0DB58ZX EXCISION OF ESOPHAGUS, VIA NATURAL OR ARTIFICIAL OPENING ENDOSCOPIC, DIAGNOSTIC: ICD-10-PCS | Performed by: INTERNAL MEDICINE

## 2019-11-22 RX ORDER — CHOLECALCIFEROL (VITAMIN D3) 125 MCG
500 CAPSULE ORAL DAILY
Status: DISCONTINUED | OUTPATIENT
Start: 2019-11-22 | End: 2019-11-23

## 2019-11-22 RX ORDER — PANTOPRAZOLE SODIUM 40 MG/1
40 TABLET, DELAYED RELEASE ORAL
Status: DISCONTINUED | OUTPATIENT
Start: 2019-11-22 | End: 2019-11-23

## 2019-11-22 RX ORDER — SODIUM CHLORIDE, SODIUM LACTATE, POTASSIUM CHLORIDE, CALCIUM CHLORIDE 600; 310; 30; 20 MG/100ML; MG/100ML; MG/100ML; MG/100ML
INJECTION, SOLUTION INTRAVENOUS CONTINUOUS PRN
Status: DISCONTINUED | OUTPATIENT
Start: 2019-11-22 | End: 2019-11-22 | Stop reason: SURG

## 2019-11-22 RX ORDER — LIDOCAINE HYDROCHLORIDE 10 MG/ML
INJECTION, SOLUTION EPIDURAL; INFILTRATION; INTRACAUDAL; PERINEURAL AS NEEDED
Status: DISCONTINUED | OUTPATIENT
Start: 2019-11-22 | End: 2019-11-22 | Stop reason: SURG

## 2019-11-22 RX ORDER — SODIUM CHLORIDE, SODIUM LACTATE, POTASSIUM CHLORIDE, CALCIUM CHLORIDE 600; 310; 30; 20 MG/100ML; MG/100ML; MG/100ML; MG/100ML
INJECTION, SOLUTION INTRAVENOUS CONTINUOUS
Status: DISCONTINUED | OUTPATIENT
Start: 2019-11-22 | End: 2019-11-23

## 2019-11-22 RX ORDER — SODIUM CHLORIDE 9 MG/ML
INJECTION, SOLUTION INTRAVENOUS CONTINUOUS
Status: DISCONTINUED | OUTPATIENT
Start: 2019-11-22 | End: 2019-11-23

## 2019-11-22 RX ORDER — NALOXONE HYDROCHLORIDE 0.4 MG/ML
80 INJECTION, SOLUTION INTRAMUSCULAR; INTRAVENOUS; SUBCUTANEOUS AS NEEDED
Status: ACTIVE | OUTPATIENT
Start: 2019-11-22 | End: 2019-11-22

## 2019-11-22 RX ADMIN — SODIUM CHLORIDE, SODIUM LACTATE, POTASSIUM CHLORIDE, CALCIUM CHLORIDE: 600; 310; 30; 20 INJECTION, SOLUTION INTRAVENOUS at 11:33:00

## 2019-11-22 RX ADMIN — SODIUM CHLORIDE, SODIUM LACTATE, POTASSIUM CHLORIDE, CALCIUM CHLORIDE: 600; 310; 30; 20 INJECTION, SOLUTION INTRAVENOUS at 10:53:00

## 2019-11-22 RX ADMIN — LIDOCAINE HYDROCHLORIDE 100 MG: 10 INJECTION, SOLUTION EPIDURAL; INFILTRATION; INTRACAUDAL; PERINEURAL at 10:56:00

## 2019-11-22 NOTE — PROGRESS NOTES
[facility]    Progress Note    Chantell Brittle Patient Status:  Observation    1940 MRN [id number]   Location [facility]5W Attending Robby Mcintosh MD   Hosp Day # 0 PCP Christianne Rand MD       Subjective:   Tally Smoker T Daily   • hydrALAZINE HCl  50 mg Oral TID   • Labetalol HCl  300 mg Oral 2 times per day   • OLANZapine  5 mg Oral Nightly       Current PRN Inpatient Meds:      Atropine Sulfate, Naloxone HCl, HYDROcodone-acetaminophen, HYDROmorphone HCl, Normal Saline Fl 11/19/19. Imaging/EKG:              Assessment and Plan:   1.        Microcytic anemia, no evidence of active blood loss.   -transfused 1 unit PRBC, hgb stable  -venofer given  -cont PO iron  -checked B12 -- low, replace IM  -GI evaluation appreciated, E

## 2019-11-22 NOTE — H&P
PRE-PROCEDURE UPDATE    HPI: Faisal Mitchell is a 78year old female. 8/2/1940. Patient presents for a colonoscopy and gastroscopy.  Anemia, iron deficient, no overt bleeding, see consult note for details    ALLERGIES:   Abilify [Aripiprazo*        Comm placement   • TRANSFORAMINAL LUMBAR EPIDURAL STEROID INJECTION SINGLE LEVEL N/A 6/6/2018    Performed by Charles Nuñez MD at [address] [place] [address] MAIN OR   • WRIST FRACTURE SURGERY      left wrist       PHYSICAL EXAM:  /83 (BP Location: Left arm)   Pulse 70   T

## 2019-11-22 NOTE — OPERATIVE REPORT
ESOPHAGOGASTRODUODENOSCOPY AND COLONOSCOPY REPORT    Patient Name:  Yoanna Wright Record #: [id number]  YOB: 1940  Date of Procedure: 11/22/2019    Referring physician: Merilynn Apley, MD    Surgeon:  Anupama Herrera MD    Pre-op quality of the bowel prep was poor with stool throughout the colon. Exam was extremely limited but given her difficulty taking the bowel prep did advance to the cecum and identified the appendiceal orifice.  There were no large lesions noted or blood in the

## 2019-11-22 NOTE — PLAN OF CARE
Shaan Graves is feeling \"great except for my sciatica\" this morning.  NPO for EGD/colonoscopy  Problem: HEMATOLOGIC - ADULT  Goal: Maintains hematologic stability  Description  INTERVENTIONS  - Assess for signs and symptoms of bleeding or hemorrhage  - Yesica Goal: for blood pressure to be better    Interventions:   - monitor vs  - See additional Care Plan goals for specific interventions   Outcome: Progressing

## 2019-11-22 NOTE — PLAN OF CARE
Hgb and K improving. No c/o pain. Plan for EGD/LGI today. CLD/golytely till 0500 then NPO.     Problem: HEMATOLOGIC - ADULT  Goal: Maintains hematologic stability  Description  INTERVENTIONS  - Assess for signs and symptoms of bleeding or hemorrhage  - Yesica Goal: for blood pressure to be better    Interventions:   - monitor vs  - See additional Care Plan goals for specific interventions   Outcome: Progressing

## 2019-11-22 NOTE — ANESTHESIA POSTPROCEDURE EVALUATION
Patient: Filemon Beach    Procedure Summary     Date:  11/22/19 Room / Location:  [facility] ENDOSCOPY 05 / [facility] ENDOSCOPY    Anesthesia Start:  0895 Anesthesia Stop:      Procedures:       ESOPHAGOGASTRODUODENOSCOPY (EGD) (N/A )      COLONOSCOPY (N/A ) Diagnos

## 2019-11-22 NOTE — PROGRESS NOTES
[facility]    Progress Note    Diego Sweet Patient Status:  Observation    1940 MRN [id number]   Location [facility]5W Attending Lisandra Mishra MD   Hosp Day # 0 PCP Sonja Sullivan MD       Subjective:   Jimbo SO Nightly   • FLUoxetine HCl  20 mg Oral Daily   • hydrALAZINE HCl  50 mg Oral TID   • Labetalol HCl  300 mg Oral 2 times per day   • lisinopril  10 mg Oral Daily   • OLANZapine  5 mg Oral Nightly   • torsemide  20 mg Oral Daily       Current PRN Inpatient M results found for this visit on 11/19/19. Imaging/EKG:              Assessment and Plan:   1.        Microcytic anemia, no evidence of active blood loss.   -transfused 1 unit PRBC yesterday, hgb stable  -venofer given  -cont PO iron  -checked B12 -- low,

## 2019-11-22 NOTE — ANESTHESIA PREPROCEDURE EVALUATION
Anesthesia PreOp Note    HPI:     Natalie Wasserman is a 78year old female who presents for preoperative consultation requested by: Perla Dixon MD    Date of Surgery: 11/19/2019 - 11/22/2019    Procedure(s):  ESOPHAGOGASTRODUODENOSCOPY (EGD)  Edi Miller L5-S1 mild-mod central, L3-4 right mild, L2-3 mild central bulging discs         Date Noted: 12/08/2014      L1-2 right mild HNP         Date Noted: 12/08/2014      right L2 radiculitis and right L5-S1 radiculitis         Date Noted: 10/02/2014      L5-S • OTHER SURGICAL HISTORY  2004    Renal Artery Stent   • OTHER SURGICAL HISTORY  2009    Local resection - bladder   • OTHER SURGICAL HISTORY  2013    Removal of kidney stone and stent placement   • TRANSFORAMINAL LUMBAR EPIDURAL STEROID INJECTION SINGLE L 0.9% NaCl infusion, , Intravenous, Continuous, Joe Singh MD, Last Rate: 75 mL/hr at 11/22/19 0842  HYDROcodone-acetaminophen (NORCO) 5-325 MG per tab 1 tablet, 1 tablet, Oral, Q6H PRN, Joe Singh MD, 1 tablet at 11/21/19 1113  HYDROmorphon OLANZapine (ZYPREXA ZYDIS) disintegrating tab 5 mg, 5 mg, Oral, Nightly, Ariane Pena MD, 5 mg at 11/21/19 2115    No current [facility]-ordered outpatient medications on file.         Abilify [Aripiprazo*        Comment:drooling  Iodine [Radiology Michlele Conway Attends meetings of clubs or organizations: Not on file        Relationship status: Not on file      Intimate partner violence:        Fear of current or ex partner: Not on file        Emotionally abused: Not on file        Physically abused: Not on Temp: 98.4 °F (36.9 °C) 98.7 °F (37.1 °C) 98.6 °F (37 °C)    TempSrc: Oral Oral Oral    SpO2:  100% 94% 94%   Weight:  146 lb 1.6 oz     Height:            Anesthesia Evaluation     Patient summary reviewed and Nursing notes reviewed    Airway   Mallampati

## 2019-11-23 VITALS
WEIGHT: 151.31 LBS | HEIGHT: 60 IN | RESPIRATION RATE: 18 BRPM | BODY MASS INDEX: 29.7 KG/M2 | TEMPERATURE: 98 F | HEART RATE: 66 BPM | SYSTOLIC BLOOD PRESSURE: 143 MMHG | DIASTOLIC BLOOD PRESSURE: 52 MMHG | OXYGEN SATURATION: 95 %

## 2019-11-23 PROCEDURE — 99217 OBSERVATION CARE DISCHARGE: CPT | Performed by: HOSPITALIST

## 2019-11-23 RX ORDER — AMLODIPINE BESYLATE 10 MG/1
15 TABLET ORAL
Status: SHIPPED | COMMUNITY
Start: 2019-11-23 | End: 2019-12-02

## 2019-11-23 RX ORDER — HYDROCODONE BITARTRATE AND ACETAMINOPHEN 5; 325 MG/1; MG/1
1 TABLET ORAL EVERY 6 HOURS PRN
Qty: 30 TABLET | Refills: 0 | Status: SHIPPED | OUTPATIENT
Start: 2019-11-23

## 2019-11-23 RX ORDER — MELATONIN
325 2 TIMES DAILY WITH MEALS
Qty: 60 TABLET | Refills: 0 | Status: SHIPPED | OUTPATIENT
Start: 2019-11-23 | End: 2019-12-02

## 2019-11-23 RX ORDER — PANTOPRAZOLE SODIUM 40 MG/1
40 TABLET, DELAYED RELEASE ORAL
Qty: 30 TABLET | Refills: 0 | Status: SHIPPED | OUTPATIENT
Start: 2019-11-24 | End: 2019-12-02

## 2019-11-23 NOTE — PLAN OF CARE
Problem: HEMATOLOGIC - ADULT  Goal: Maintains hematologic stability  Description  INTERVENTIONS  - Assess for signs and symptoms of bleeding or hemorrhage  - Monitor labs and vital signs for trends  - Administer supportive blood products/factors, fluids Care Plan goals for specific interventions   Outcome: Progressing     Pt resting in bed comfortably, no complaints of pain at this time. O2 sat 89 on ra, placed on 1L NC for night time.  Call light within reach and calls appropriately, will continue to [facility]

## 2019-11-23 NOTE — DISCHARGE SUMMARY
[facility]    Discharge Summary    Sylvia Munoz Patient Status:  Observation    1940 MRN [id number]   Location [facility]5W Attending Eliseo Hendricks MD   Hosp Day # 0 PCP Vignesh Blackman MD     Date of Admission:  consult appreciated  -outpt f/u      6. Memory impairment / possible dementia  -TSH ok  -B12 - low, will replace PO    CULTURE:   No results found for this visit on 11/19/19.     IMAGING STUDIES: SOME MAY NEED FOLLOW UP WITH PCP         LABS :     Lab Instructions Prescription details   cyanocobalamin 500 MCG Tabs  Start taking on:  November 24, 2019      Take 1 tablet (500 mcg total) by mouth daily.    Quantity:  90 tablet  Refills:  0     ferrous sulfate 325 (65 FE) MG Tbec      Take 1 tablet (325 mg t Take 50 mg by mouth 3 (three) times daily.    Refills:  0     Labetalol HCl 300 MG Tabs  Commonly known as:  NORMODYNE      TAKE 1 TABLET(300 MG) BY MOUTH TWICE DAILY   Quantity:  180 tablet  Refills:  1     lisinopril 10 MG Tabs      Take 1 tablet (10 mg t

## 2019-11-23 NOTE — PHYSICAL THERAPY NOTE
PHYSICAL THERAPY EVALUATION - INPATIENT     Room Number: 556/071-O  Evaluation Date: 11/23/2019  Type of Evaluation: Initial   Physician Order: PT Eval and Treat    Presenting Problem: anemia with need for transfusion /increase chronic pain right LE limit cues provided. Therapy did reinforced proper sequencing and spine sparing mobility . Pt with CGA for ambulation with RW used with slow gait speed and decrease gait quality tolerating 35 ft .  Pt declined any more ambulation with PT due to pain and weakness Recommendations: 24 hour care/supervision;Sub-acute rehabilitation;Home;Home with home health PT([place] vs ability to progress to home)    PLAN  PT Treatment Plan: Bed mobility; Body mechanics; Endurance; Energy conservation;Patient education; Family education;Ga impairment / possible dementia  -TSH ok  -B12 - low, will replace PO     CULTURE:   Recent Results   No results found for this visit on 11/19/19.        IMAGING STUDIES: SOME MAY NEED FOLLOW UP WITH PCP            Problem List  Principal Problem:    Normoc 6/6/2018    Performed by Camilo Milan MD at [facility] MAIN OR   • WRIST FRACTURE SURGERY      left wrist       HOME SITUATION  Type of Home: Condo(elevator building )   Home Layout: One level                Lives With: Alone  Drives: No  Patient Owned Equ After activity BP   125/56  HR  66 O2 sats 95 %           AM-PAC '6-Clicks' INPATIENT SHORT FORM - BASIC MOBILITY  How much difficulty does the patient currently have. ..  -   Turning over in bed (including adjusting bedclothes, sheets and blankets)?: A L as noted by improved safety and fxn tolerances with fxn mobility in prep for d/c     Goal #5   Current Status    Goal #6    Goal #6  Current Status

## 2019-11-23 NOTE — PLAN OF CARE
Problem: HEMATOLOGIC - ADULT  Goal: Maintains hematologic stability  Description  INTERVENTIONS  - Assess for signs and symptoms of bleeding or hemorrhage  - Monitor labs and vital signs for trends  - Administer supportive blood products/factors, fluids Care Plan goals for specific interventions   Outcome: Progressing   Pt feeling better this am.  Vitals stable. Worked with physical therapy. Creatinine improving, will stop iv fluids. Pt to be d/c home later today via [employer].   Encouraged pt to increase

## 2019-11-23 NOTE — CM/SW NOTE
MDO for dc. Confirmed with Sergey Richey, pt is requesting a medicar and is aware of and agreeable to fees. [facility] notified of dc. / to remain available for support and/or discharge planning.      Pj Dillon RN

## 2019-11-25 ENCOUNTER — TELEPHONE (OUTPATIENT)
Dept: INTERNAL MEDICINE CLINIC | Facility: CLINIC | Age: 79
End: 2019-11-25

## 2019-11-25 ENCOUNTER — PATIENT OUTREACH (OUTPATIENT)
Dept: CASE MANAGEMENT | Age: 79
End: 2019-11-25

## 2019-11-25 DIAGNOSIS — Z02.9 ENCOUNTERS FOR ADMINISTRATIVE PURPOSE: ICD-10-CM

## 2019-11-25 NOTE — PROGRESS NOTES
Initial Post Discharge Follow Up   Discharge Date: 11/23/19  Contact Date: 11/25/2019    Consent Verification:  Assessment Completed With: Patient  HIPAA Verified?   Yes    Discharge Dx:   Normocytic anemia       General:   • How have you been since your tablet 0   • Vitamin B-12 500 MCG Oral Tab Take 1 tablet (500 mcg total) by mouth daily. 90 tablet 0   • FLUoxetine HCl 20 MG Oral Cap Take 20 mg by mouth daily. • diazepam 10 MG Oral Tab Take 10 mg by mouth nightly.  And prn      • atorvastatin 40 MG O ordered at D/C? Yes   What? wheelchair  Have you received your (DME)? yes       Needs post D/C:   Now that you are home, are there any needs or concerns you need addressed before your next visit with your PCP?  (DME, meds, disease concerns, Etc):  No     F pain. NC scheduled patient for a HFU on 12/2/2019. She denied having any questions or concerns at this time.        [x]  Discharge Summary, Discharge medications reviewed/discussed/and reconciled against outpatient medications with patient,  and orders rev

## 2019-11-25 NOTE — TELEPHONE ENCOUNTER
Siomara, would like to inform the doctor that there is a delay in start of care. Per Beverly Speaker they will try again tomorrow to try to reach the patient.

## 2019-11-26 ENCOUNTER — TELEPHONE (OUTPATIENT)
Dept: INTERNAL MEDICINE CLINIC | Facility: CLINIC | Age: 79
End: 2019-11-26

## 2019-11-26 NOTE — TELEPHONE ENCOUNTER
Attempted to reach [facility], no answer message left in confidential Nickolasna Aliciaer Dr. Pablo Waller received message in regards to delay of care for patient.

## 2019-11-26 NOTE — TELEPHONE ENCOUNTER
Marcelo Thompson, would like to inform the doctor that they have not been able to get a hold of the patient. They will be a delay the start of care until tomorrow 11-27-19.  They have left messages for the patient on the numbers that they have and have not received a

## 2019-11-26 NOTE — PROGRESS NOTES
11/26/2019  Sheyla Mantilla  [address] [facility]    Dear Evelyn Kang,       Here are the findings from your recent EGD (Upper  Endoscopy) : Biopsies of the esophagus showed acid reflux changes.  No inflammation or infection in the

## 2019-12-02 ENCOUNTER — PATIENT OUTREACH (OUTPATIENT)
Dept: CASE MANAGEMENT | Age: 79
End: 2019-12-02

## 2019-12-02 ENCOUNTER — TELEPHONE (OUTPATIENT)
Dept: INTERNAL MEDICINE CLINIC | Facility: CLINIC | Age: 79
End: 2019-12-02

## 2019-12-02 ENCOUNTER — OFFICE VISIT (OUTPATIENT)
Dept: INTERNAL MEDICINE CLINIC | Facility: CLINIC | Age: 79
End: 2019-12-02
Payer: MEDICARE

## 2019-12-02 VITALS
HEIGHT: 61 IN | BODY MASS INDEX: 27.5 KG/M2 | WEIGHT: 145.63 LBS | HEART RATE: 87 BPM | SYSTOLIC BLOOD PRESSURE: 148 MMHG | OXYGEN SATURATION: 96 % | DIASTOLIC BLOOD PRESSURE: 76 MMHG

## 2019-12-02 DIAGNOSIS — D64.9 ANEMIA, UNSPECIFIED TYPE: Primary | ICD-10-CM

## 2019-12-02 DIAGNOSIS — N18.30 CHRONIC KIDNEY DISEASE (CKD), STAGE III (MODERATE) (HCC): ICD-10-CM

## 2019-12-02 DIAGNOSIS — D50.9 MICROCYTIC ANEMIA: ICD-10-CM

## 2019-12-02 DIAGNOSIS — E87.6 HYPOKALEMIA: ICD-10-CM

## 2019-12-02 PROCEDURE — 1111F DSCHRG MED/CURRENT MED MERGE: CPT | Performed by: PHYSICIAN ASSISTANT

## 2019-12-02 PROCEDURE — 99495 TRANSJ CARE MGMT MOD F2F 14D: CPT | Performed by: PHYSICIAN ASSISTANT

## 2019-12-02 RX ORDER — PANTOPRAZOLE SODIUM 40 MG/1
40 TABLET, DELAYED RELEASE ORAL
Qty: 30 TABLET | Refills: 1 | Status: SHIPPED | OUTPATIENT
Start: 2019-12-02 | End: 2020-03-27

## 2019-12-02 RX ORDER — AMLODIPINE BESYLATE 10 MG/1
15 TABLET ORAL DAILY
Qty: 45 TABLET | Refills: 2 | Status: SHIPPED | OUTPATIENT
Start: 2019-12-02 | End: 2020-04-24

## 2019-12-02 RX ORDER — POTASSIUM CHLORIDE 750 MG/1
10 TABLET, FILM COATED, EXTENDED RELEASE ORAL DAILY
Qty: 30 TABLET | Refills: 1 | OUTPATIENT
Start: 2019-12-02 | End: 2020-01-17

## 2019-12-02 RX ORDER — MELATONIN
325 2 TIMES DAILY WITH MEALS
Qty: 60 TABLET | Refills: 1 | Status: SHIPPED | OUTPATIENT
Start: 2019-12-02 | End: 2020-01-17

## 2019-12-02 NOTE — PROGRESS NOTES
HPI:    Sylvia Munoz is a 78year old female here today for hospital follow up.    She was discharged from Inpatient hospital, [facility]  to Home   Admission Date: 11/19/19   Discharge Date: 11/23/19  Hospital Discharge Diagnoses (since 11/2/2 she was unable to  her medication for the past week.     Allergies:  She is allergic to abilify [aripiprazole]; iodine [radiology contrast iodinated dyes]; silicone; and wellbutrin [bupropion hcl, smoking].     Current Meds:  HYDROcodone-acetaminophe stenosis, Tendonitis, Unspecified essential hypertension, and Ventricular aneurysm.     She  has a past surgical history that includes carpal tunnel release; other surgical history (2004); other surgical history (2009); other surgical history (2013); hyster Conjunctivae and EOM are normal.   Neck: Normal range of motion. Neck supple. No thyromegaly present. Cardiovascular: Normal rate, regular rhythm and normal heart sounds.     Pulmonary/Chest: Effort normal and breath sounds normal. No respiratory distress Visit:  Requested Prescriptions     Signed Prescriptions Disp Refills   • ferrous sulfate 325 (65 FE) MG Oral Tab EC 60 tablet 1     Sig: Take 1 tablet (325 mg total) by mouth 2 (two) times daily with meals.    • Pantoprazole Sodium 40 MG Oral Tab EC 30 tab

## 2019-12-02 NOTE — TELEPHONE ENCOUNTER
[facility] called in stating that PT evaluation was done on 12/1. The physical therapy plan of care will be 2x per week for 3 weeks (total of 6 visits). Please advise.

## 2019-12-03 NOTE — TELEPHONE ENCOUNTER
Contacted [facility] informed of verbal approval from [facility], verbalized understanding. No further action required.

## 2019-12-13 ENCOUNTER — TELEPHONE (OUTPATIENT)
Dept: INTERNAL MEDICINE CLINIC | Facility: CLINIC | Age: 79
End: 2019-12-13

## 2019-12-13 NOTE — TELEPHONE ENCOUNTER
Alivia from  [facility] calling letting Dr know she tried to see patient, states she is not answering her door. States she can hear tv and patient is not letting her in. Also went yesterday and did not let her in.  She will try again next week if she's refu

## 2019-12-13 NOTE — TELEPHONE ENCOUNTER
I tried to call patient  Phone rang twice then call dropped  RN please follow up on pt make sure she is OK

## 2019-12-14 NOTE — TELEPHONE ENCOUNTER
Still no answer then goes to a busy signal. LMTCB at sister's number. Spoke to Nazanin Chaudhary friend as well stated that has not been able to get a hold of patient as well. Indicated that will call [facility] neighbor to check on her and will call us back.  Ph

## 2019-12-14 NOTE — TELEPHONE ENCOUNTER
Gerhard Marcial friend called back indicated that spoke to [place] patient's neighbor and he did speak to patient yesterday. Patient is doing ok, just currently does not have a phone.  Indicated that when home health RN came out she told [place] that she needed to get

## 2019-12-14 NOTE — TELEPHONE ENCOUNTER
LMTCB- transfer to triage    Called [phone]  [phone] to do a wellness check on the patient, since hospitalized back in November for critical hemoglobin. Venita Moon indicated that will check on patient and have patient call office tomorrow morning.

## 2019-12-16 ENCOUNTER — TELEPHONE (OUTPATIENT)
Dept: INTERNAL MEDICINE CLINIC | Facility: CLINIC | Age: 79
End: 2019-12-16

## 2019-12-16 NOTE — TELEPHONE ENCOUNTER
Left message on voicemail for JOSE Bunch  to continue home health. If any questions to call the office back.

## 2019-12-20 ENCOUNTER — TELEPHONE (OUTPATIENT)
Dept: INTERNAL MEDICINE CLINIC | Facility: CLINIC | Age: 79
End: 2019-12-20

## 2019-12-20 NOTE — TELEPHONE ENCOUNTER
Familia Garcia with [facility] calling to let Dr Marva Olivas know she saw patient today, will see her again in 2 weeks, currently on getting her homemaker back. ([facility])

## 2020-01-01 ENCOUNTER — TELEPHONE (OUTPATIENT)
Dept: INTERNAL MEDICINE CLINIC | Facility: CLINIC | Age: 80
End: 2020-01-01

## 2020-01-01 ENCOUNTER — VIRTUAL PHONE E/M (OUTPATIENT)
Dept: INTERNAL MEDICINE CLINIC | Facility: CLINIC | Age: 80
End: 2020-01-01
Payer: MEDICARE

## 2020-01-01 DIAGNOSIS — G47.00 INSOMNIA, UNSPECIFIED TYPE: Primary | ICD-10-CM

## 2020-01-01 DIAGNOSIS — M54.31 SCIATICA OF RIGHT SIDE: ICD-10-CM

## 2020-01-01 PROCEDURE — 99441 PHONE E/M BY PHYS 5-10 MIN: CPT | Performed by: PHYSICIAN ASSISTANT

## 2020-01-01 RX ORDER — PANTOPRAZOLE SODIUM 40 MG/1
TABLET, DELAYED RELEASE ORAL
Qty: 30 TABLET | Refills: 1 | Status: SHIPPED | OUTPATIENT
Start: 2020-01-01 | End: 2021-01-01

## 2020-01-01 RX ORDER — DIAZEPAM 10 MG/1
20 TABLET ORAL NIGHTLY
Qty: 30 TABLET | Refills: 0 | Status: SHIPPED | OUTPATIENT
Start: 2020-01-01 | End: 2021-01-01

## 2020-01-01 RX ORDER — ATORVASTATIN CALCIUM 40 MG/1
TABLET, FILM COATED ORAL
Qty: 90 TABLET | Refills: 1 | Status: SHIPPED | OUTPATIENT
Start: 2020-01-01 | End: 2021-01-01

## 2020-01-16 NOTE — TELEPHONE ENCOUNTER
90 day supply - Request for Authorization        Current Outpatient Medications:     •  ferrous sulfate 325 (65 FE) MG Oral Tab EC, Take 1 tablet (325 mg total) by mouth 2 (two) times daily with meals. , Disp: 60 tablet, Rfl: 1    •  Potassium Chloride ER 1

## 2020-01-17 NOTE — TELEPHONE ENCOUNTER
Review pended refill request as it does not fall under a protocol. Pharmacy request for 90-day supply. Last Rx: ferrous 12/2/19 #60 x 1. Potassium 12/2/19 #30 x1.   Requested Prescriptions     Pending Prescriptions Disp Refills   • ferrous sulfate 325 (6

## 2020-01-21 RX ORDER — POTASSIUM CHLORIDE 750 MG/1
10 TABLET, FILM COATED, EXTENDED RELEASE ORAL DAILY
Qty: 30 TABLET | Refills: 0 | Status: SHIPPED | OUTPATIENT
Start: 2020-01-21 | End: 2021-01-01

## 2020-01-21 RX ORDER — MELATONIN
325 2 TIMES DAILY WITH MEALS
Qty: 180 TABLET | Refills: 0 | Status: SHIPPED | OUTPATIENT
Start: 2020-01-21 | End: 2020-04-14

## 2020-01-22 ENCOUNTER — TELEPHONE (OUTPATIENT)
Dept: INTERNAL MEDICINE CLINIC | Facility: CLINIC | Age: 80
End: 2020-01-22

## 2020-01-22 NOTE — TELEPHONE ENCOUNTER
Pharmacy is requesting a script clarification on the following medication. Please advise    •  ferrous sulfate 325 (65 FE) MG Oral Tab EC, Take 1 tablet (325 mg total) by mouth 2 (two) times daily with meals. , Disp: 180 tablet, Rfl: 0    Script clarificati

## 2020-01-23 NOTE — TELEPHONE ENCOUNTER
Spoke to [facility] pharmacy. Technician states patient has been receiving Ferrous Sulfate 325mg (not the delayed release).   Advised to continue giving her what she has always been receiving in the past.

## 2020-01-24 ENCOUNTER — PATIENT OUTREACH (OUTPATIENT)
Dept: CASE MANAGEMENT | Age: 80
End: 2020-01-24

## 2020-01-29 ENCOUNTER — PATIENT OUTREACH (OUTPATIENT)
Dept: CASE MANAGEMENT | Age: 80
End: 2020-01-29

## 2020-01-29 DIAGNOSIS — M51.36 LUMBAR DEGENERATIVE DISC DISEASE: ICD-10-CM

## 2020-01-29 DIAGNOSIS — E78.00 HYPERCHOLESTEROLEMIA: ICD-10-CM

## 2020-01-29 DIAGNOSIS — M54.31 SCIATICA OF RIGHT SIDE: ICD-10-CM

## 2020-01-29 DIAGNOSIS — F31.9 BIPOLAR 1 DISORDER (HCC): ICD-10-CM

## 2020-01-29 DIAGNOSIS — I10 UNCONTROLLED HYPERTENSION: ICD-10-CM

## 2020-01-29 NOTE — PROGRESS NOTES
I want to know a little about you. • What do you do for fun? Nothing right now  • Any hobbies? What Hobbies? Garden, knit, sewing  • What do you do for work?  Retired and disabled     Social support:  • Do you have someone you can turn to when you are very you want to work on incorporating more movement or exercise into your daily routine? Once pain resolves    Goals:  • What would you say is your biggest concerns about your health?  Depression and sciatica    • What steps do you think you could take to work side effects.     Your appointments     Date & Time Appointment Department [facility])    Feb 21, 2020  2:00 PM CST Follow Up Visit with Deep Bolivar MD [facility], [facility], [address] [facility] [address] ([address] [facility])            [address]

## 2020-01-31 PROCEDURE — 99490 CHRNC CARE MGMT STAFF 1ST 20: CPT

## 2020-01-31 PROCEDURE — G2058 CCM ADD 20MIN: HCPCS

## 2020-02-20 ENCOUNTER — PATIENT OUTREACH (OUTPATIENT)
Dept: CASE MANAGEMENT | Age: 80
End: 2020-02-20

## 2020-02-20 ENCOUNTER — TELEPHONE (OUTPATIENT)
Dept: INTERNAL MEDICINE CLINIC | Facility: CLINIC | Age: 80
End: 2020-02-20

## 2020-02-20 DIAGNOSIS — R26.89 BALANCE PROBLEM: ICD-10-CM

## 2020-02-20 DIAGNOSIS — F32.A DEPRESSIVE DISORDER: ICD-10-CM

## 2020-02-20 DIAGNOSIS — Z78.9 IMPAIRED MOBILITY AND ACTIVITIES OF DAILY LIVING: ICD-10-CM

## 2020-02-20 DIAGNOSIS — M54.31 SCIATICA OF RIGHT SIDE: ICD-10-CM

## 2020-02-20 DIAGNOSIS — F31.9 BIPOLAR 1 DISORDER (HCC): ICD-10-CM

## 2020-02-20 DIAGNOSIS — Z74.09 IMPAIRED MOBILITY AND ACTIVITIES OF DAILY LIVING: ICD-10-CM

## 2020-02-20 DIAGNOSIS — Z74.09 IMPAIRED MOBILITY: ICD-10-CM

## 2020-02-20 DIAGNOSIS — M54.16 LUMBAR RADICULOPATHY: ICD-10-CM

## 2020-02-20 DIAGNOSIS — H53.9 VISUAL DISTURBANCE: Primary | ICD-10-CM

## 2020-02-20 DIAGNOSIS — M51.36 LUMBAR DEGENERATIVE DISC DISEASE: ICD-10-CM

## 2020-02-20 NOTE — TELEPHONE ENCOUNTER
Pt is requesting a referral for a DM eye exam. Pt would like to see a Opthalmology at [facility] so she may take Healthy driven bus.

## 2020-02-21 PROBLEM — G89.29 CHRONIC PAIN: Status: ACTIVE | Noted: 2020-02-21

## 2020-02-21 NOTE — PROGRESS NOTES
2/21/2020  Spoke to Shaan Graves for CCM.       Updates to patient care team/ comments: UTD  Patient reported changes in medications: None  Med Adherence  Comment: Taking as directed    Health Maintenance: Pt has Medicare Px scheduled for March 3, 2020  refer working towards improving preventative health and seeking help for transportation    • Patient Reported New Barriers And Concerns: No new barriers but would like to establish transportation service provider and applying for services.                    - Pl

## 2020-02-26 RX ORDER — CHOLECALCIFEROL (VITAMIN D3) 125 MCG
CAPSULE ORAL
Qty: 90 TABLET | Refills: 1 | Status: SHIPPED | OUTPATIENT
Start: 2020-02-26 | End: 2020-09-09

## 2020-02-29 PROCEDURE — G2058 CCM ADD 20MIN: HCPCS

## 2020-02-29 PROCEDURE — 99490 CHRNC CARE MGMT STAFF 1ST 20: CPT

## 2020-03-04 ENCOUNTER — TELEPHONE (OUTPATIENT)
Dept: OTHER | Age: 80
End: 2020-03-04

## 2020-03-04 ENCOUNTER — OFFICE VISIT (OUTPATIENT)
Dept: INTERNAL MEDICINE CLINIC | Facility: CLINIC | Age: 80
End: 2020-03-04
Payer: MEDICARE

## 2020-03-04 ENCOUNTER — LAB ENCOUNTER (OUTPATIENT)
Dept: LAB | Age: 80
End: 2020-03-04
Attending: PHYSICIAN ASSISTANT
Payer: MEDICARE

## 2020-03-04 VITALS
OXYGEN SATURATION: 94 % | HEIGHT: 61 IN | HEART RATE: 52 BPM | DIASTOLIC BLOOD PRESSURE: 72 MMHG | SYSTOLIC BLOOD PRESSURE: 140 MMHG | WEIGHT: 151 LBS | BODY MASS INDEX: 28.51 KG/M2 | TEMPERATURE: 98 F

## 2020-03-04 DIAGNOSIS — E11.22 TYPE 2 DIABETES MELLITUS WITH STAGE 3 CHRONIC KIDNEY DISEASE, WITHOUT LONG-TERM CURRENT USE OF INSULIN (HCC): ICD-10-CM

## 2020-03-04 DIAGNOSIS — E78.5 HYPERLIPIDEMIA, UNSPECIFIED HYPERLIPIDEMIA TYPE: ICD-10-CM

## 2020-03-04 DIAGNOSIS — N18.30 CHRONIC KIDNEY DISEASE (CKD), STAGE III (MODERATE) (HCC): ICD-10-CM

## 2020-03-04 DIAGNOSIS — E87.6 HYPOKALEMIA: ICD-10-CM

## 2020-03-04 DIAGNOSIS — M54.31 SCIATICA OF RIGHT SIDE: ICD-10-CM

## 2020-03-04 DIAGNOSIS — I10 ESSENTIAL HYPERTENSION: Primary | ICD-10-CM

## 2020-03-04 DIAGNOSIS — N18.30 TYPE 2 DIABETES MELLITUS WITH STAGE 3 CHRONIC KIDNEY DISEASE, WITHOUT LONG-TERM CURRENT USE OF INSULIN (HCC): ICD-10-CM

## 2020-03-04 DIAGNOSIS — D64.9 ANEMIA, UNSPECIFIED TYPE: ICD-10-CM

## 2020-03-04 DIAGNOSIS — D50.9 MICROCYTIC ANEMIA: ICD-10-CM

## 2020-03-04 LAB
ALBUMIN SERPL-MCNC: 3.6 G/DL (ref 3.4–5)
ALBUMIN/GLOB SERPL: 1.2 {RATIO} (ref 1–2)
ALP LIVER SERPL-CCNC: 75 U/L (ref 55–142)
ALT SERPL-CCNC: 23 U/L (ref 13–56)
ANION GAP SERPL CALC-SCNC: 8 MMOL/L (ref 0–18)
AST SERPL-CCNC: 11 U/L (ref 15–37)
BASOPHILS # BLD AUTO: 0.04 X10(3) UL (ref 0–0.2)
BASOPHILS NFR BLD AUTO: 0.8 %
BILIRUB SERPL-MCNC: 0.6 MG/DL (ref 0.1–2)
BUN BLD-MCNC: 9 MG/DL (ref 7–18)
BUN/CREAT SERPL: 7.8 (ref 10–20)
CALCIUM BLD-MCNC: 9 MG/DL (ref 8.5–10.1)
CHLORIDE SERPL-SCNC: 104 MMOL/L (ref 98–112)
CHOLEST SMN-MCNC: 182 MG/DL (ref ?–200)
CO2 SERPL-SCNC: 30 MMOL/L (ref 21–32)
CREAT BLD-MCNC: 1.15 MG/DL (ref 0.55–1.02)
DEPRECATED RDW RBC AUTO: 45.7 FL (ref 35.1–46.3)
EOSINOPHIL # BLD AUTO: 0.45 X10(3) UL (ref 0–0.7)
EOSINOPHIL NFR BLD AUTO: 9.3 %
ERYTHROCYTE [DISTWIDTH] IN BLOOD BY AUTOMATED COUNT: 14.6 % (ref 11–15)
GLOBULIN PLAS-MCNC: 2.9 G/DL (ref 2.8–4.4)
GLUCOSE BLD-MCNC: 108 MG/DL (ref 70–99)
HCT VFR BLD AUTO: 36.2 % (ref 35–48)
HDLC SERPL-MCNC: 53 MG/DL (ref 40–59)
HGB BLD-MCNC: 12.4 G/DL (ref 12–16)
IMM GRANULOCYTES # BLD AUTO: 0.02 X10(3) UL (ref 0–1)
IMM GRANULOCYTES NFR BLD: 0.4 %
LDLC SERPL CALC-MCNC: 100 MG/DL (ref ?–100)
LYMPHOCYTES # BLD AUTO: 0.97 X10(3) UL (ref 1–4)
LYMPHOCYTES NFR BLD AUTO: 20.1 %
M PROTEIN MFR SERPL ELPH: 6.5 G/DL (ref 6.4–8.2)
MCH RBC QN AUTO: 29.5 PG (ref 26–34)
MCHC RBC AUTO-ENTMCNC: 34.3 G/DL (ref 31–37)
MCV RBC AUTO: 86 FL (ref 80–100)
MONOCYTES # BLD AUTO: 0.36 X10(3) UL (ref 0.1–1)
MONOCYTES NFR BLD AUTO: 7.5 %
NEUTROPHILS # BLD AUTO: 2.99 X10 (3) UL (ref 1.5–7.7)
NEUTROPHILS # BLD AUTO: 2.99 X10(3) UL (ref 1.5–7.7)
NEUTROPHILS NFR BLD AUTO: 61.9 %
NONHDLC SERPL-MCNC: 129 MG/DL (ref ?–130)
OSMOLALITY SERPL CALC.SUM OF ELEC: 293 MOSM/KG (ref 275–295)
PATIENT FASTING Y/N/NP: YES
PATIENT FASTING Y/N/NP: YES
PLATELET # BLD AUTO: 230 10(3)UL (ref 150–450)
POTASSIUM SERPL-SCNC: 2.2 MMOL/L (ref 3.5–5.1)
RBC # BLD AUTO: 4.21 X10(6)UL (ref 3.8–5.3)
SODIUM SERPL-SCNC: 142 MMOL/L (ref 136–145)
TRIGL SERPL-MCNC: 144 MG/DL (ref 30–149)
VLDLC SERPL CALC-MCNC: 29 MG/DL (ref 0–30)
WBC # BLD AUTO: 4.8 X10(3) UL (ref 4–11)

## 2020-03-04 PROCEDURE — 85025 COMPLETE CBC W/AUTO DIFF WBC: CPT

## 2020-03-04 PROCEDURE — 80053 COMPREHEN METABOLIC PANEL: CPT

## 2020-03-04 PROCEDURE — 36415 COLL VENOUS BLD VENIPUNCTURE: CPT

## 2020-03-04 PROCEDURE — 99213 OFFICE O/P EST LOW 20 MIN: CPT | Performed by: PHYSICIAN ASSISTANT

## 2020-03-04 PROCEDURE — 80061 LIPID PANEL: CPT

## 2020-03-04 RX ORDER — LISINOPRIL 5 MG/1
5 TABLET ORAL DAILY
Qty: 30 TABLET | Refills: 0 | Status: SHIPPED | OUTPATIENT
Start: 2020-03-04 | End: 2020-03-27

## 2020-03-04 NOTE — TELEPHONE ENCOUNTER
Received critical lab result of potassium 2.2. [facility] message sent to ANTHONY Etienne who is aware and has already addressed it. For any further concerns please contact Dr. Mike Jackson who is on call.

## 2020-03-04 NOTE — PROGRESS NOTES
HPI:    Patient ID: Ashely Russo is a 78year old female. HPI    Review of Systems   Constitutional: Negative. HENT: Negative. Eyes: Negative. Respiratory: Negative. Cardiovascular: Negative. Gastrointestinal: Negative.     Risa Waggoner acetaminophen (TYLENOL EXTRA STRENGTH) 500 MG Oral Tab Take 500 mg by mouth every 6 (six) hours as needed for Pain. • aspirin 81 MG Oral Tab EC Take 81 mg by mouth daily.  aspirin 81 mg effervescent tablet       Allergies:  Abilify [Aripiprazo*        C years ago    • [address]     • COLONOSCOPY N/A 11/22/2019    Performed by Drake Luis MD at [facility] ENDOSCOPY   • COLONOSCOPY N/A 11/26/2016    Performed by Tram Tejada MD at [facility] ENDOSCOPY   • ESOPHAGOGASTRODUODENOSCOPY (EGD) N/A 11/22/2019 wheezes. Abdominal: Soft. There is no tenderness. Lymphadenopathy:     She has no cervical adenopathy. Neurological: She is alert and oriented to person, place, and time. Skin: Skin is warm and dry. Psychiatric: She has a normal mood and affect.

## 2020-03-06 ENCOUNTER — TELEPHONE (OUTPATIENT)
Dept: INTERNAL MEDICINE CLINIC | Facility: CLINIC | Age: 80
End: 2020-03-06

## 2020-03-06 NOTE — TELEPHONE ENCOUNTER
Pt returned call and was informed of GISELLE Andre's response below. Pt voiced understanding and states will have lab drawn next week Friday.

## 2020-03-06 NOTE — TELEPHONE ENCOUNTER
BRODERICK Andre Pt called us back and she was informed of your messages below. Pt stated that she picked up the script that you sent on 3/4 yesterday night as she has someone to pick it up for her. She will take   80meq daily for 3 days then 40meq for 3 days.

## 2020-03-10 RX ORDER — POTASSIUM CHLORIDE 1500 MG/1
TABLET, FILM COATED, EXTENDED RELEASE ORAL
Qty: 30 TABLET | Refills: 0 | Status: SHIPPED | OUTPATIENT
Start: 2020-03-10 | End: 2020-04-03

## 2020-03-10 NOTE — TELEPHONE ENCOUNTER
Pt stater she out of potassium. Please advise.      Refill Protocol Appointment Criteria  · Appointment scheduled in the past 6 months or in the next 3 months  Recent Outpatient Visits            6 days ago Essential hypertension    [facility] [place]

## 2020-03-12 ENCOUNTER — APPOINTMENT (OUTPATIENT)
Dept: LAB | Age: 80
End: 2020-03-12
Attending: PHYSICIAN ASSISTANT
Payer: MEDICARE

## 2020-03-12 DIAGNOSIS — E87.6 HYPOKALEMIA: ICD-10-CM

## 2020-03-12 LAB
ANION GAP SERPL CALC-SCNC: 7 MMOL/L (ref 0–18)
BUN BLD-MCNC: 11 MG/DL (ref 7–18)
BUN/CREAT SERPL: 10 (ref 10–20)
CALCIUM BLD-MCNC: 9.7 MG/DL (ref 8.5–10.1)
CHLORIDE SERPL-SCNC: 108 MMOL/L (ref 98–112)
CO2 SERPL-SCNC: 28 MMOL/L (ref 21–32)
CREAT BLD-MCNC: 1.1 MG/DL (ref 0.55–1.02)
GLUCOSE BLD-MCNC: 101 MG/DL (ref 70–99)
OSMOLALITY SERPL CALC.SUM OF ELEC: 296 MOSM/KG (ref 275–295)
PATIENT FASTING Y/N/NP: NO
POTASSIUM SERPL-SCNC: 3.2 MMOL/L (ref 3.5–5.1)
SODIUM SERPL-SCNC: 143 MMOL/L (ref 136–145)

## 2020-03-12 PROCEDURE — 80048 BASIC METABOLIC PNL TOTAL CA: CPT

## 2020-03-12 PROCEDURE — 36415 COLL VENOUS BLD VENIPUNCTURE: CPT

## 2020-03-24 ENCOUNTER — PATIENT OUTREACH (OUTPATIENT)
Dept: CASE MANAGEMENT | Age: 80
End: 2020-03-24

## 2020-03-24 NOTE — PROGRESS NOTES
Contacting patient to follow up on CCM for the month.  LMCB    Time with pt -  min  Chart review -2  min  Total time -2  min  Total Monthly time- 2 min

## 2020-03-27 RX ORDER — LISINOPRIL 5 MG/1
TABLET ORAL
Qty: 30 TABLET | Refills: 0 | Status: SHIPPED | OUTPATIENT
Start: 2020-03-27 | End: 2020-04-20

## 2020-03-27 RX ORDER — PANTOPRAZOLE SODIUM 40 MG/1
TABLET, DELAYED RELEASE ORAL
Qty: 30 TABLET | Refills: 1 | Status: SHIPPED | OUTPATIENT
Start: 2020-03-27 | End: 2020-04-20

## 2020-04-01 ENCOUNTER — PATIENT OUTREACH (OUTPATIENT)
Dept: CASE MANAGEMENT | Age: 80
End: 2020-04-01

## 2020-04-01 DIAGNOSIS — M54.31 SCIATICA OF RIGHT SIDE: ICD-10-CM

## 2020-04-01 DIAGNOSIS — E11.9 WELL CONTROLLED TYPE 2 DIABETES MELLITUS (HCC): ICD-10-CM

## 2020-04-01 DIAGNOSIS — I25.10 CORONARY ARTERY DISEASE DUE TO LIPID RICH PLAQUE: ICD-10-CM

## 2020-04-01 DIAGNOSIS — F41.9 ANXIETY: ICD-10-CM

## 2020-04-01 DIAGNOSIS — F31.9 BIPOLAR 1 DISORDER (HCC): ICD-10-CM

## 2020-04-01 DIAGNOSIS — F31.9 DEPRESSED BIPOLAR I DISORDER (HCC): ICD-10-CM

## 2020-04-01 DIAGNOSIS — I25.83 CORONARY ARTERY DISEASE DUE TO LIPID RICH PLAQUE: ICD-10-CM

## 2020-04-01 NOTE — PROGRESS NOTES
4/1/2020  Spoke to Rosamaria Smith for CCM.       Updates to patient care team/ comments: UTD  Patient reported changes in medications: None    Med Adherence  Comment: Taking as directed    Health Maintenance: Encouraged updating once public health concerns have confident                                     confidence:5       Care Manager Follow Up: One month    Care Manager interventions:Encouraged patient to call ride [address] as they request private informations.  Advised patient I would try to locate a local food

## 2020-04-03 ENCOUNTER — TELEPHONE (OUTPATIENT)
Dept: CARDIOLOGY CLINIC | Facility: CLINIC | Age: 80
End: 2020-04-03

## 2020-04-03 RX ORDER — POTASSIUM CHLORIDE 1500 MG/1
TABLET, FILM COATED, EXTENDED RELEASE ORAL
Qty: 90 TABLET | Refills: 0 | Status: SHIPPED | OUTPATIENT
Start: 2020-04-03 | End: 2020-08-17

## 2020-04-14 ENCOUNTER — TELEPHONE (OUTPATIENT)
Dept: INTERNAL MEDICINE CLINIC | Facility: CLINIC | Age: 80
End: 2020-04-14

## 2020-04-20 RX ORDER — PANTOPRAZOLE SODIUM 40 MG/1
TABLET, DELAYED RELEASE ORAL
Qty: 30 TABLET | Refills: 1 | OUTPATIENT
Start: 2020-04-20

## 2020-04-20 RX ORDER — LISINOPRIL 5 MG/1
5 TABLET ORAL DAILY
Qty: 30 TABLET | Refills: 1 | Status: SHIPPED | OUTPATIENT
Start: 2020-04-20 | End: 2020-01-01

## 2020-04-20 RX ORDER — LISINOPRIL 5 MG/1
TABLET ORAL
Qty: 30 TABLET | Refills: 0 | OUTPATIENT
Start: 2020-04-20

## 2020-04-20 RX ORDER — PANTOPRAZOLE SODIUM 40 MG/1
40 TABLET, DELAYED RELEASE ORAL
Qty: 30 TABLET | Refills: 1 | Status: SHIPPED | OUTPATIENT
Start: 2020-04-20 | End: 2020-05-16

## 2020-04-24 RX ORDER — AMLODIPINE BESYLATE 10 MG/1
TABLET ORAL
Qty: 135 TABLET | Refills: 0 | Status: SHIPPED | OUTPATIENT
Start: 2020-04-24 | End: 2020-11-19

## 2020-04-24 RX ORDER — POTASSIUM CHLORIDE 1500 MG/1
TABLET, EXTENDED RELEASE ORAL
Qty: 90 TABLET | Refills: 0 | OUTPATIENT
Start: 2020-04-24

## 2020-04-30 PROCEDURE — 99490 CHRNC CARE MGMT STAFF 1ST 20: CPT

## 2020-05-14 ENCOUNTER — TELEPHONE (OUTPATIENT)
Dept: INTERNAL MEDICINE CLINIC | Facility: CLINIC | Age: 80
End: 2020-05-14

## 2020-05-14 NOTE — TELEPHONE ENCOUNTER
Spoke with patient (name and  verified) . Has been taking Lisinopril 10mg for a long time. Mail order refill came today with  Lisinopril 5mg. Asking if dosage changed. LOV: 3-4-2020    ASSESSMENT/PLAN:   1.  Essential hypertension  -blood press

## 2020-05-15 NOTE — TELEPHONE ENCOUNTER
From my last note  I increased her dose to 15 mg  So she will take 10 mg  And 5 mg together  I ordered 10 mg  Monitor home BP follow up  Advised  sched tele visit if needed  Need BP readings

## 2020-05-16 RX ORDER — PANTOPRAZOLE SODIUM 40 MG/1
TABLET, DELAYED RELEASE ORAL
Qty: 30 TABLET | Refills: 1 | Status: SHIPPED | OUTPATIENT
Start: 2020-05-16 | End: 2020-07-08

## 2020-05-18 RX ORDER — LISINOPRIL 5 MG/1
TABLET ORAL
Qty: 30 TABLET | Refills: 1 | OUTPATIENT
Start: 2020-05-18

## 2020-05-19 ENCOUNTER — PATIENT OUTREACH (OUTPATIENT)
Dept: CASE MANAGEMENT | Age: 80
End: 2020-05-19

## 2020-05-19 DIAGNOSIS — E11.9 WELL CONTROLLED TYPE 2 DIABETES MELLITUS (HCC): ICD-10-CM

## 2020-05-19 DIAGNOSIS — F41.9 ANXIETY: ICD-10-CM

## 2020-05-19 DIAGNOSIS — F31.9 BIPOLAR 1 DISORDER (HCC): ICD-10-CM

## 2020-05-19 DIAGNOSIS — E78.00 HYPERCHOLESTEROLEMIA: ICD-10-CM

## 2020-05-19 DIAGNOSIS — F32.A DEPRESSIVE DISORDER: ICD-10-CM

## 2020-05-19 NOTE — PROGRESS NOTES
5/19/2020  Spoke to Evelyn Kang for CCM.       Updates to patient care team/ comments: UTD  Patient reported changes in medications: None  Med Adherence  Comment: Taking as directed    Health Maintenance:Reviewed and encouraged updating once [facility] health co month    Reason For Follow Up: review progress and or barriers towards patients goals. Care managers interventions: Encouraged three balanced meals along with 20-30 minutes of daily exercise and stretching as tolerated.  Sympathetically listening to pt

## 2020-05-30 RX ORDER — ATORVASTATIN CALCIUM 40 MG/1
TABLET, FILM COATED ORAL
Qty: 90 TABLET | Refills: 1 | Status: SHIPPED | OUTPATIENT
Start: 2020-05-30 | End: 2020-01-01

## 2020-05-30 RX ORDER — LABETALOL 300 MG/1
TABLET, FILM COATED ORAL
Qty: 180 TABLET | Refills: 1 | Status: SHIPPED | OUTPATIENT
Start: 2020-05-30 | End: 2020-12-07

## 2020-05-31 PROCEDURE — 99490 CHRNC CARE MGMT STAFF 1ST 20: CPT

## 2020-06-26 ENCOUNTER — PATIENT OUTREACH (OUTPATIENT)
Dept: CASE MANAGEMENT | Age: 80
End: 2020-06-26

## 2020-06-26 NOTE — PROGRESS NOTES
Attempted to reach pt no answer. Phone does not forward to answering machine. I will try to reach pt later in the day.

## 2020-07-08 RX ORDER — PANTOPRAZOLE SODIUM 40 MG/1
TABLET, DELAYED RELEASE ORAL
Qty: 30 TABLET | Refills: 1 | Status: SHIPPED | OUTPATIENT
Start: 2020-07-08 | End: 2020-08-08

## 2020-07-09 ENCOUNTER — PATIENT OUTREACH (OUTPATIENT)
Dept: CASE MANAGEMENT | Age: 80
End: 2020-07-09

## 2020-07-09 NOTE — PROGRESS NOTES
Attempted to reach pt. Telephone just rings does not forward to voicemail or answering machine. I will try to reach pt at another time. Contacting patient to follow up on CCM for the month.  LMCB       Time with pt -  min  Chart review - 2 min  Tota

## 2020-07-22 ENCOUNTER — TELEPHONE (OUTPATIENT)
Dept: INTERNAL MEDICINE CLINIC | Facility: CLINIC | Age: 80
End: 2020-07-22

## 2020-07-22 NOTE — TELEPHONE ENCOUNTER
Patient is calling to get clarification if she is suppose to take the 10mg or 5mg of her Lisinopril. Or is patient suppose to take both together or at at different time     Please advise.

## 2020-07-23 NOTE — TELEPHONE ENCOUNTER
Chart reviewed. Per last office visit patient should be taking lisinopril 15 mg daily. She is due for a follow up. Encourage her to schedule.

## 2020-07-23 NOTE — TELEPHONE ENCOUNTER
2nd attempt to call. NO answer. Rings with no VM. IF patient calls, transfer to triage [id number]. If no response, send letter.

## 2020-07-25 NOTE — TELEPHONE ENCOUNTER
Attempted to reach patient. Phone  rings ike goes to busy signal.   Will attempt to reach patient at a later time.

## 2020-08-08 RX ORDER — PANTOPRAZOLE SODIUM 40 MG/1
TABLET, DELAYED RELEASE ORAL
Qty: 30 TABLET | Refills: 1 | Status: SHIPPED | OUTPATIENT
Start: 2020-08-08 | End: 2020-09-07

## 2020-08-12 ENCOUNTER — PATIENT OUTREACH (OUTPATIENT)
Dept: CASE MANAGEMENT | Age: 80
End: 2020-08-12

## 2020-08-17 RX ORDER — POTASSIUM CHLORIDE 1500 MG/1
TABLET, FILM COATED, EXTENDED RELEASE ORAL
Qty: 90 TABLET | Refills: 0 | Status: SHIPPED | OUTPATIENT
Start: 2020-08-17 | End: 2020-11-13

## 2020-08-18 ENCOUNTER — PATIENT OUTREACH (OUTPATIENT)
Dept: CASE MANAGEMENT | Age: 80
End: 2020-08-18

## 2020-08-18 NOTE — PROGRESS NOTES
Attempted to reach pt. Phone number on file always sounds busy or disconnected. No response letter mailed.      Time with pt -  min  Chart review - 5 min  Total time - 7 min  Total Monthly time- 7 min

## 2020-08-21 RX ORDER — FERROUS SULFATE 325(65) MG
TABLET ORAL
Qty: 180 TABLET | Refills: 0 | Status: SHIPPED | OUTPATIENT
Start: 2020-08-21 | End: 2021-01-01

## 2020-08-25 ENCOUNTER — PATIENT OUTREACH (OUTPATIENT)
Dept: CASE MANAGEMENT | Age: 80
End: 2020-08-25

## 2020-09-03 ENCOUNTER — PATIENT OUTREACH (OUTPATIENT)
Dept: CASE MANAGEMENT | Age: 80
End: 2020-09-03

## 2020-09-07 RX ORDER — PANTOPRAZOLE SODIUM 40 MG/1
TABLET, DELAYED RELEASE ORAL
Qty: 30 TABLET | Refills: 1 | Status: SHIPPED | OUTPATIENT
Start: 2020-09-07 | End: 2020-10-02

## 2020-09-09 RX ORDER — CALCIUM CARB/VIT D3/MINERALS 600 MG-200
TABLET,CHEWABLE ORAL
Qty: 90 TABLET | Refills: 1 | Status: SHIPPED | OUTPATIENT
Start: 2020-09-09

## 2020-09-10 RX ORDER — LISINOPRIL 5 MG/1
TABLET ORAL
Qty: 30 TABLET | Refills: 1 | OUTPATIENT
Start: 2020-09-10

## 2020-09-23 RX ORDER — LISINOPRIL 5 MG/1
TABLET ORAL
Qty: 30 TABLET | Refills: 1 | OUTPATIENT
Start: 2020-09-23

## 2020-09-29 ENCOUNTER — PATIENT OUTREACH (OUTPATIENT)
Dept: CASE MANAGEMENT | Age: 80
End: 2020-09-29

## 2020-10-02 RX ORDER — PANTOPRAZOLE SODIUM 40 MG/1
TABLET, DELAYED RELEASE ORAL
Qty: 30 TABLET | Refills: 1 | Status: SHIPPED | OUTPATIENT
Start: 2020-10-02 | End: 2020-10-26

## 2020-10-26 RX ORDER — PANTOPRAZOLE SODIUM 40 MG/1
TABLET, DELAYED RELEASE ORAL
Qty: 30 TABLET | Refills: 1 | Status: SHIPPED | OUTPATIENT
Start: 2020-10-26 | End: 2021-01-01

## 2020-11-13 RX ORDER — POTASSIUM CHLORIDE 1500 MG/1
TABLET, FILM COATED, EXTENDED RELEASE ORAL
Qty: 90 TABLET | Refills: 0 | Status: SHIPPED | OUTPATIENT
Start: 2020-11-13 | End: 2021-01-01

## 2020-11-19 RX ORDER — AMLODIPINE BESYLATE 10 MG/1
TABLET ORAL
Qty: 135 TABLET | Refills: 0 | Status: SHIPPED | OUTPATIENT
Start: 2020-11-19 | End: 2021-01-01

## 2020-11-19 NOTE — TELEPHONE ENCOUNTER
Verified name and . Patient following up with prescription as seen below.    Medication pended for your review and approval.

## 2020-12-07 RX ORDER — LABETALOL 300 MG/1
TABLET, FILM COATED ORAL
Qty: 180 TABLET | Refills: 1 | Status: SHIPPED | OUTPATIENT
Start: 2020-12-07 | End: 2021-01-01

## 2020-12-17 NOTE — PROGRESS NOTES
Virtual Telephone Check-In    Victorino Del Real verbally consents to a Virtual/Telephone Check-In visit on 12/17/20. Patient has been referred to the [facility] website at [email or web address] to review the yearly Consent to Treat document.     Patient unders

## 2020-12-17 NOTE — TELEPHONE ENCOUNTER
Patient states she was prescribed Diazepam 10 mg by her former psychiatrist to sleep.     Patient asking if this can now be filled by GISELLE Torres.

## 2020-12-17 NOTE — TELEPHONE ENCOUNTER
Patient states that she has chronic sciatica pain and was prescribed the Diazepam by her Psychiatrist to help with sleep. She states that it helps her fall asleep from the pain and she is not depressed anymore.  She oes not want to follow up with the Psychi

## 2021-01-01 ENCOUNTER — OFFICE VISIT (OUTPATIENT)
Dept: INTERNAL MEDICINE CLINIC | Facility: CLINIC | Age: 81
End: 2021-01-01
Payer: MEDICARE

## 2021-01-01 ENCOUNTER — LAB ENCOUNTER (OUTPATIENT)
Dept: LAB | Age: 81
End: 2021-01-01
Attending: INTERNAL MEDICINE
Payer: MEDICARE

## 2021-01-01 ENCOUNTER — TELEPHONE (OUTPATIENT)
Dept: INTERNAL MEDICINE CLINIC | Facility: CLINIC | Age: 81
End: 2021-01-01

## 2021-01-01 ENCOUNTER — NURSE TRIAGE (OUTPATIENT)
Dept: INTERNAL MEDICINE CLINIC | Facility: CLINIC | Age: 81
End: 2021-01-01

## 2021-01-01 ENCOUNTER — TELEPHONE (OUTPATIENT)
Dept: CASE MANAGEMENT | Age: 81
End: 2021-01-01

## 2021-01-01 VITALS
BODY MASS INDEX: 29.64 KG/M2 | DIASTOLIC BLOOD PRESSURE: 96 MMHG | WEIGHT: 157 LBS | HEART RATE: 60 BPM | SYSTOLIC BLOOD PRESSURE: 191 MMHG | HEIGHT: 61 IN

## 2021-01-01 DIAGNOSIS — D64.9 ANEMIA, UNSPECIFIED TYPE: ICD-10-CM

## 2021-01-01 DIAGNOSIS — E78.00 HYPERCHOLESTEROLEMIA: ICD-10-CM

## 2021-01-01 DIAGNOSIS — I50.32 CHRONIC HEART FAILURE WITH PRESERVED EJECTION FRACTION (HCC): ICD-10-CM

## 2021-01-01 DIAGNOSIS — I10 ESSENTIAL HYPERTENSION: ICD-10-CM

## 2021-01-01 DIAGNOSIS — I67.1 CEREBRAL ANEURYSM, NONRUPTURED: ICD-10-CM

## 2021-01-01 DIAGNOSIS — Z78.9 IMPAIRED ACTIVITIES OF DAILY LIVING: ICD-10-CM

## 2021-01-01 DIAGNOSIS — Z23 NEED FOR VACCINATION: ICD-10-CM

## 2021-01-01 DIAGNOSIS — M48.04 THORACIC SPINAL STENOSIS: ICD-10-CM

## 2021-01-01 DIAGNOSIS — Z00.00 ENCOUNTER FOR ANNUAL HEALTH EXAMINATION: ICD-10-CM

## 2021-01-01 DIAGNOSIS — Z00.00 MEDICARE ANNUAL WELLNESS VISIT, SUBSEQUENT: Primary | ICD-10-CM

## 2021-01-01 DIAGNOSIS — I73.9 PERIPHERAL VASCULAR DISEASE (HCC): ICD-10-CM

## 2021-01-01 DIAGNOSIS — I25.10 CORONARY ARTERY DISEASE DUE TO LIPID RICH PLAQUE: ICD-10-CM

## 2021-01-01 DIAGNOSIS — Z91.19 NON COMPLIANCE WITH MEDICAL TREATMENT: ICD-10-CM

## 2021-01-01 DIAGNOSIS — N18.31 TYPE 2 DIABETES MELLITUS WITH STAGE 3A CHRONIC KIDNEY DISEASE, WITHOUT LONG-TERM CURRENT USE OF INSULIN (HCC): ICD-10-CM

## 2021-01-01 DIAGNOSIS — M54.31 SCIATICA OF RIGHT SIDE: ICD-10-CM

## 2021-01-01 DIAGNOSIS — Z12.31 VISIT FOR SCREENING MAMMOGRAM: ICD-10-CM

## 2021-01-01 DIAGNOSIS — I77.1 TORTUOUS AORTA (HCC): ICD-10-CM

## 2021-01-01 DIAGNOSIS — Z78.0 POSTMENOPAUSAL: ICD-10-CM

## 2021-01-01 DIAGNOSIS — N18.31 STAGE 3A CHRONIC KIDNEY DISEASE (HCC): ICD-10-CM

## 2021-01-01 DIAGNOSIS — E11.22 TYPE 2 DIABETES MELLITUS WITH STAGE 3A CHRONIC KIDNEY DISEASE, WITHOUT LONG-TERM CURRENT USE OF INSULIN (HCC): ICD-10-CM

## 2021-01-01 DIAGNOSIS — I70.0 ATHEROSCLEROSIS OF AORTA (HCC): ICD-10-CM

## 2021-01-01 DIAGNOSIS — F41.9 ANXIETY: ICD-10-CM

## 2021-01-01 DIAGNOSIS — I25.83 CORONARY ARTERY DISEASE DUE TO LIPID RICH PLAQUE: ICD-10-CM

## 2021-01-01 DIAGNOSIS — F31.9 BIPOLAR 1 DISORDER (HCC): ICD-10-CM

## 2021-01-01 PROCEDURE — 3077F SYST BP >= 140 MM HG: CPT | Performed by: INTERNAL MEDICINE

## 2021-01-01 PROCEDURE — 84443 ASSAY THYROID STIM HORMONE: CPT

## 2021-01-01 PROCEDURE — 3008F BODY MASS INDEX DOCD: CPT | Performed by: INTERNAL MEDICINE

## 2021-01-01 PROCEDURE — 84439 ASSAY OF FREE THYROXINE: CPT

## 2021-01-01 PROCEDURE — 83036 HEMOGLOBIN GLYCOSYLATED A1C: CPT

## 2021-01-01 PROCEDURE — 96160 PT-FOCUSED HLTH RISK ASSMT: CPT | Performed by: INTERNAL MEDICINE

## 2021-01-01 PROCEDURE — 36415 COLL VENOUS BLD VENIPUNCTURE: CPT

## 2021-01-01 PROCEDURE — 85027 COMPLETE CBC AUTOMATED: CPT

## 2021-01-01 PROCEDURE — 99397 PER PM REEVAL EST PAT 65+ YR: CPT | Performed by: INTERNAL MEDICINE

## 2021-01-01 PROCEDURE — 3080F DIAST BP >= 90 MM HG: CPT | Performed by: INTERNAL MEDICINE

## 2021-01-01 PROCEDURE — 80053 COMPREHEN METABOLIC PANEL: CPT

## 2021-01-01 PROCEDURE — G0439 PPPS, SUBSEQ VISIT: HCPCS | Performed by: INTERNAL MEDICINE

## 2021-01-01 RX ORDER — ATORVASTATIN CALCIUM 40 MG/1
40 TABLET, FILM COATED ORAL DAILY
Qty: 90 TABLET | Refills: 0 | Status: SHIPPED | OUTPATIENT
Start: 2021-01-01 | End: 2021-01-01

## 2021-01-01 RX ORDER — POTASSIUM CHLORIDE 750 MG/1
TABLET, FILM COATED, EXTENDED RELEASE ORAL
Qty: 30 TABLET | Refills: 0 | Status: SHIPPED | OUTPATIENT
Start: 2021-01-01 | End: 2021-01-01

## 2021-01-01 RX ORDER — AMLODIPINE BESYLATE 10 MG/1
15 TABLET ORAL DAILY
Qty: 135 TABLET | Refills: 0 | Status: SHIPPED | OUTPATIENT
Start: 2021-01-01 | End: 2021-01-01

## 2021-01-01 RX ORDER — PANTOPRAZOLE SODIUM 40 MG/1
40 TABLET, DELAYED RELEASE ORAL
Qty: 30 TABLET | Refills: 3 | Status: CANCELLED | OUTPATIENT
Start: 2021-01-01

## 2021-01-01 RX ORDER — POTASSIUM CHLORIDE 750 MG/1
10 TABLET, FILM COATED, EXTENDED RELEASE ORAL DAILY
Qty: 30 TABLET | Refills: 0 | Status: SHIPPED | OUTPATIENT
Start: 2021-01-01 | End: 2021-01-01

## 2021-01-01 RX ORDER — DIAZEPAM 10 MG/1
TABLET ORAL
Qty: 30 TABLET | Refills: 0 | OUTPATIENT
Start: 2021-01-01

## 2021-01-01 RX ORDER — AMLODIPINE BESYLATE 10 MG/1
15 TABLET ORAL DAILY
Qty: 135 TABLET | Refills: 1 | Status: SHIPPED | OUTPATIENT
Start: 2021-01-01 | End: 2021-01-01

## 2021-01-01 RX ORDER — POTASSIUM CHLORIDE 1500 MG/1
TABLET, FILM COATED, EXTENDED RELEASE ORAL
Qty: 90 TABLET | Refills: 0 | OUTPATIENT
Start: 2021-01-01

## 2021-01-01 RX ORDER — LISINOPRIL 20 MG/1
20 TABLET ORAL DAILY
Qty: 90 TABLET | Refills: 1 | Status: SHIPPED | OUTPATIENT
Start: 2021-01-01

## 2021-01-01 RX ORDER — FERROUS SULFATE 325(65) MG
TABLET ORAL
Qty: 180 TABLET | Refills: 0 | Status: SHIPPED | OUTPATIENT
Start: 2021-01-01 | End: 2021-01-01

## 2021-01-01 RX ORDER — POTASSIUM CHLORIDE 1500 MG/1
TABLET, FILM COATED, EXTENDED RELEASE ORAL
Qty: 90 TABLET | Refills: 1 | Status: SHIPPED | OUTPATIENT
Start: 2021-01-01

## 2021-01-01 RX ORDER — LABETALOL 300 MG/1
TABLET, FILM COATED ORAL
Qty: 180 TABLET | Refills: 0 | Status: SHIPPED | OUTPATIENT
Start: 2021-01-01 | End: 2021-01-01

## 2021-01-01 RX ORDER — PANTOPRAZOLE SODIUM 40 MG/1
40 TABLET, DELAYED RELEASE ORAL
Qty: 30 TABLET | Refills: 1 | OUTPATIENT
Start: 2021-01-01

## 2021-01-01 RX ORDER — LABETALOL 300 MG/1
TABLET, FILM COATED ORAL
Qty: 180 TABLET | Refills: 1 | Status: SHIPPED | OUTPATIENT
Start: 2021-01-01

## 2021-01-01 RX ORDER — PANTOPRAZOLE SODIUM 40 MG/1
TABLET, DELAYED RELEASE ORAL
Qty: 30 TABLET | Refills: 1 | Status: SHIPPED | OUTPATIENT
Start: 2021-01-01 | End: 2021-01-01

## 2021-01-01 RX ORDER — POTASSIUM CHLORIDE 1500 MG/1
TABLET, EXTENDED RELEASE ORAL
Qty: 30 TABLET | Refills: 0 | Status: SHIPPED | OUTPATIENT
Start: 2021-01-01 | End: 2021-01-01

## 2021-01-01 RX ORDER — FERROUS SULFATE 325(65) MG
1 TABLET ORAL 2 TIMES DAILY WITH MEALS
Qty: 180 TABLET | Refills: 1 | Status: SHIPPED | OUTPATIENT
Start: 2021-01-01

## 2021-01-01 RX ORDER — POTASSIUM CHLORIDE 750 MG/1
10 TABLET, FILM COATED, EXTENDED RELEASE ORAL DAILY
Qty: 30 TABLET | Refills: 1 | Status: SHIPPED | OUTPATIENT
Start: 2021-01-01

## 2021-01-01 RX ORDER — LISINOPRIL 20 MG/1
20 TABLET ORAL DAILY
Qty: 90 TABLET | Refills: 1 | Status: SHIPPED | OUTPATIENT
Start: 2021-01-01 | End: 2021-01-01

## 2021-01-01 RX ORDER — ATORVASTATIN CALCIUM 40 MG/1
40 TABLET, FILM COATED ORAL DAILY
Qty: 90 TABLET | Refills: 1 | Status: SHIPPED | OUTPATIENT
Start: 2021-01-01

## 2021-01-01 RX ORDER — DIAZEPAM 10 MG/1
TABLET ORAL
Qty: 30 TABLET | Refills: 0 | Status: SHIPPED | OUTPATIENT
Start: 2021-01-01

## 2021-01-01 RX ORDER — LISINOPRIL 10 MG/1
TABLET ORAL
Qty: 90 TABLET | Refills: 3 | OUTPATIENT
Start: 2021-01-01

## 2021-01-01 RX ORDER — PANTOPRAZOLE SODIUM 40 MG/1
TABLET, DELAYED RELEASE ORAL
Qty: 30 TABLET | Refills: 1 | OUTPATIENT
Start: 2021-01-01

## 2021-01-01 RX ORDER — LISINOPRIL 10 MG/1
10 TABLET ORAL DAILY
Qty: 90 TABLET | Refills: 3 | Status: SHIPPED | OUTPATIENT
Start: 2021-01-01 | End: 2021-01-01

## 2021-01-01 RX ORDER — AMLODIPINE BESYLATE 10 MG/1
TABLET ORAL
Qty: 135 TABLET | Refills: 1 | Status: SHIPPED | OUTPATIENT
Start: 2021-01-01

## 2021-01-01 RX ORDER — POTASSIUM CHLORIDE 1500 MG/1
TABLET, FILM COATED, EXTENDED RELEASE ORAL
Qty: 90 TABLET | Refills: 0 | Status: SHIPPED | OUTPATIENT
Start: 2021-01-01 | End: 2021-01-01

## 2021-01-01 RX ORDER — PANTOPRAZOLE SODIUM 40 MG/1
40 TABLET, DELAYED RELEASE ORAL
Qty: 30 TABLET | Refills: 1 | Status: SHIPPED | OUTPATIENT
Start: 2021-01-01 | End: 2021-01-01

## 2021-01-01 RX ORDER — DIAZEPAM 10 MG/1
20 TABLET ORAL NIGHTLY PRN
Qty: 30 TABLET | Refills: 0 | Status: CANCELLED | OUTPATIENT
Start: 2021-01-01

## 2021-02-25 NOTE — TELEPHONE ENCOUNTER
Current Outpatient Medications:     •  PANTOPRAZOLE SODIUM 40 MG Oral Tab EC, TAKE 1 TABLET BY MOUTH EVERY DAY IN THE MORNING BEFORE BREAKFAST, Disp: 30 tablet, Rfl: 1  •

## 2021-02-25 NOTE — TELEPHONE ENCOUNTER
Dr Shanel Murphy, former patient of [facility]  Please reply to pool: EM TRIAGE SUPPORT

## 2021-03-30 NOTE — TELEPHONE ENCOUNTER
Patient is requesting medication refill for   lisinopril 10 MG Oral Tab    Please send to [facility].

## 2021-04-19 NOTE — TELEPHONE ENCOUNTER
Current Outpatient Medications:   ••  AMLODIPINE BESYLATE 10 MG Oral Tab, TAKE 1 AND 1/2 TABLETS BY MOUTH EVERY DAY, Disp: 135 tablet, Rfl: 0

## 2021-04-20 NOTE — TELEPHONE ENCOUNTER
Patient states she has been waiting over 1 hour for a cab. Will have to cancel her 220pm appt. She will re-schedule at a later date.

## 2021-04-21 NOTE — TELEPHONE ENCOUNTER
Patient is eligible for a 2021 Medicare Annual Wellness visit. Left message to call back [phone].

## 2021-04-26 NOTE — TELEPHONE ENCOUNTER
•  ATORVASTATIN 40 MG Oral Tab, TAKE 1 TABLET BY MOUTH EVERY DAY, Disp: 90 tablet, Rfl: 1    •  Labetalol HCl 300 MG Oral Tab, TAKE 1 TABLET BY MOUTH TWICE DAILY, Disp: 180 tablet, Rfl: 1

## 2021-05-11 PROBLEM — G89.29 CHRONIC PAIN: Status: RESOLVED | Noted: 2020-02-21 | Resolved: 2021-01-01

## 2021-05-11 PROBLEM — Z74.09 IMPAIRED MOBILITY: Status: RESOLVED | Noted: 2017-03-02 | Resolved: 2021-01-01

## 2021-05-18 NOTE — TELEPHONE ENCOUNTER
Pt would like her after summary visit paper work from 5/11 mailed to her home. Pt said she lost her copy. Pt also said there was a referral for a pain Dr as well.   Please advise

## 2021-05-24 PROBLEM — E11.9 WELL CONTROLLED TYPE 2 DIABETES MELLITUS (HCC): Status: RESOLVED | Noted: 2017-03-02 | Resolved: 2021-01-01

## 2021-05-24 PROBLEM — Z79.899 POLYPHARMACY: Status: RESOLVED | Noted: 2017-03-02 | Resolved: 2021-01-01

## 2021-05-24 PROBLEM — M54.31 SCIATICA OF RIGHT SIDE: Status: RESOLVED | Noted: 2018-06-01 | Resolved: 2021-01-01

## 2021-05-24 PROBLEM — D64.9 NORMOCYTIC ANEMIA: Status: RESOLVED | Noted: 2019-11-19 | Resolved: 2021-01-01

## 2021-05-24 PROBLEM — F17.200 SMOKER: Status: RESOLVED | Noted: 2017-03-02 | Resolved: 2021-01-01

## 2021-05-24 PROBLEM — D50.9 MICROCYTIC ANEMIA: Status: RESOLVED | Noted: 2019-11-19 | Resolved: 2021-01-01

## 2021-05-24 PROBLEM — I77.1 TORTUOUS AORTA (HCC): Status: RESOLVED | Noted: 2019-08-30 | Resolved: 2021-01-01

## 2021-05-24 PROBLEM — D50.8 OTHER IRON DEFICIENCY ANEMIA: Status: RESOLVED | Noted: 2019-07-19 | Resolved: 2021-01-01

## 2021-05-24 NOTE — PROGRESS NOTES
HPI:   Flora Perez is a [de-identified]year old female who presents for a Medicare Subsequent Annual Wellness visit (Pt already had Initial Annual Wellness).       Her last annual assessment has been over 1 year: Annual Physical due on 03/02/2018         Fall/Risk Smoking status: Former Smoker        Packs/day: 1.00        Years: 50.00        Pack years: 50        Types: Cigarettes        Quit date: 2019        Years since quittin.3      Smokeless tobacco: Former User      Tobacco comment: patient reports wellbutrin [bupropion hcl, smoking]; and neomycin-bacitracin-polymyxin. CURRENT MEDICATIONS:   Potassium Chloride ER 20 MEQ Oral Tab CR, TAKE 1 TABLET BY MOUTH EVERY DAY  amLODIPine Besylate 10 MG Oral Tab, Take 1.5 tablets (15 mg total) by mouth daily. Diabetes in her brother and mother; Lung Disorder in her mother. SOCIAL HISTORY:   She  reports that she quit smoking about 2 years ago. Her smoking use included cigarettes. She has a 50.00 pack-year smoking history.  She has quit using smokeless tobacco. following the conversations when two or more people are talking at the same time: No   I have trouble understanding things on the TV: No I have to strain to understand conversations: No   I have to worry about missing the telephone ring or doorbell: No I h respiratory distress. Breath sounds: Normal breath sounds. No wheezing or rales. Chest:      Chest wall: No tenderness. Abdominal:      General: Bowel sounds are normal. There is no distension. Palpations: Abdomen is soft. There is no mass. 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 No follow-ups on file.      Christianne Rand MD, 5/24/2021     General Health     In the past six months, have you lost more than 10 pounds without trying?: 2 - No  Has your appetite been poor?: No  How does the patient main Maintenance if applicable    Chlamydia  Annually if high risk No results found for: CHLAMYDIA No flowsheet data found.     Screening Mammogram      Mammogram  Annually to 76, then as discussed There are no preventive care reminders to display for this patie Template: CATIEH AMB MEDICARE ANNUAL ASSESSMENT FEMALE Abelino Malone [[id number]]

## 2021-05-24 NOTE — PATIENT INSTRUCTIONS
Rebeca Patino's SCREENING SCHEDULE   Tests on this list are recommended by your physician but may not be covered, or covered at this frequency, by your insurer. Please check with your insurance carrier before scheduling to verify coverage.    PORSCHE Covered every 10 years- more often if abnormal There are no preventive care reminders to display for this patient.  Update Health Maintenance if applicable    Flex Sigmoidoscopy Screen  Covered every 5 years No results found for this or any previous visit orders found for this or any previous visit. Please get once after your 65th birthday    Pneumococcal 23 (Pneumovax)  Covered Once after 65 No orders found for this or any previous visit.  Please get once after your 65th birthday    Hepatitis B for Moderate

## 2021-08-06 NOTE — TELEPHONE ENCOUNTER
Action Requested: Summary for Provider     []  Critical Lab, Recommendations Needed  [x] Need Additional Advice  []   FYI    []   Need Orders  [] Need Medications Sent to Pharmacy  []  Other     SUMMARY: Pt c/o sciatica pain that radiates down her right hi

## 2021-08-06 NOTE — TELEPHONE ENCOUNTER
Verified pt name and . Reviewed doctor's message as noted below and advised pt to take Tylenol according to directions on label. Pt agreed with recommendations and had no further questions.

## 2021-08-09 NOTE — TELEPHONE ENCOUNTER
Please review. Protocol failed / No protocol.     Requested Prescriptions   Pending Prescriptions Disp Refills    LISINOPRIL 20 MG Oral Tab [Pharmacy Med Name: LISINOPRIL 20 MG TABLET] 90 tablet 1     Sig: TAKE 1 TABLET BY MOUTH EVERY DAY        Hypertensi Detail Level: Zone Quality 226: Preventive Care And Screening: Tobacco Use: Screening And Cessation Intervention: Patient screened for tobacco use and is an ex/non-smoker

## 2021-09-20 NOTE — TELEPHONE ENCOUNTER
Protocol failed or has No Protocol, please review  Requested Prescriptions   Pending Prescriptions Disp Refills    POTASSIUM CHLORIDE ER 10 MEQ Oral Tab CR [Pharmacy Med Name: POTASSIUM CL ER 10 MEQ TABLET] 30 tablet 0     Sig: TAKE 1 TABLET BY MOUTH EVERY

## 2021-10-02 NOTE — TELEPHONE ENCOUNTER
Please review; protocol failed/no protocol.      Requested Prescriptions   Pending Prescriptions Disp Refills    LABETALOL  MG Oral Tab [Pharmacy Med Name: LABETALOL  MG TABLET] 180 tablet 0     Sig: TAKE 1 TABLET BY MOUTH TWICE A DAY        H

## 2021-12-02 NOTE — TELEPHONE ENCOUNTER
Patient would like to know if Dr. Constance Regalado approves her to get the covid vaccines. She has not received her 1st or 2nd dose. She was informed by a nurse that she shouldn't get it and then was advised by her psychiatrist to consult with PCP. Please advise.

## 2021-12-06 NOTE — TELEPHONE ENCOUNTER
Unable to reach pt or leave a message. Will send no response letter. Dr. Bruce Christine, please close this encounter. no

## 2021-12-07 NOTE — TELEPHONE ENCOUNTER
Please review. Protocol failed or has no protocol.   Requested Prescriptions   Pending Prescriptions Disp Refills    POTASSIUM CHLORIDE ER 10 MEQ Oral Tab CR [Pharmacy Med Name: POTASSIUM CL ER 10 MEQ TABLET] 30 tablet 0     Sig: TAKE 1 TABLET BY MOUTH EVERY DAY        There is no refill protocol information for this order         Recent Outpatient Visits              7 months ago Medicare annual wellness visit, subsequent    Anoop Dillon MD    Office Visit    11 months ago Insomnia, unspecified type    [place] [facility], [address] [place], PA-C    Virtual Phone E/M    1 year ago Essential hypertension    [facility] [address] [place], [place]    Office Visit    2 years ago Anemia, unspecified type    [facility] [address] [place], PA-C    Office Visit    2 years ago Well controlled type 2 diabetes mellitus [facility])    [place] [facility], [address] [place], [place]    Office Visit

## 2021-12-15 NOTE — TELEPHONE ENCOUNTER
[facility] is calling to report death. Patient passed today 12/15/21 at 1:40pm in her home in bed. Call with questions.

## 2021-12-17 ENCOUNTER — TELEPHONE (OUTPATIENT)
Dept: INTERNAL MEDICINE CLINIC | Facility: CLINIC | Age: 81
End: 2021-12-17

## 2021-12-17 NOTE — TELEPHONE ENCOUNTER
Spoke to Brent navas informed Dr Wilbert Wong will sign death certificate 06[birth year]92/[birth year] . Brent navas would like death certificate fax to [phone] and to their office to speed up the process . Fax # to [facility] home is [phone] .    Awaiting fax # to send the certificat

## 2021-12-17 NOTE — TELEPHONE ENCOUNTER
Fax number to [address] [place] [address] office was provided . Death Certificate was  faxed to [phone](confirmation received) .

## 2021-12-17 NOTE — TELEPHONE ENCOUNTER
Rosario Juan from [facility] she faxed the death certificate yesterday to sign and fill for patient and needs it for  arrangement. Rosario Juan is asking for a call back to know if it was received.  # [phone]

## 2021-12-17 NOTE — TELEPHONE ENCOUNTER
Spoke to [employer] and informed him NP Diann Talley is not able to sign death certificate . Per [employer] asking if on call provider can be contacted and ask if they can sign off .  Per [employer] they would like death certificate asap as they found Pt dead in her home a

## 2021-12-17 NOTE — TELEPHONE ENCOUNTER
Elana Dozier called back requesting that the death certificate be signed by a licensed physician with an [facility] number. Please call him at [phone] with any questions.

## 2021-12-17 NOTE — TELEPHONE ENCOUNTER
Spoke to Major Gonzalez at [facility] informed death certificate was received . Dr Tanika Hansen is out of the office , will ask [facility] if she can sign certificate .

## 2021-12-17 NOTE — TELEPHONE ENCOUNTER
I am not in the office today, but I will be at [address] tomorrow. Please see if it can be faxed to me at [address] tomorrow morning and I will sign.

## 2021-12-18 ENCOUNTER — MED REC SCAN ONLY (OUTPATIENT)
Dept: INTERNAL MEDICINE CLINIC | Facility: CLINIC | Age: 81
End: 2021-12-18

## 2022-12-14 NOTE — TELEPHONE ENCOUNTER
Review pended refill request as it does not fall under a protocol.   Requested Prescriptions     Pending Prescriptions Disp Refills   • VITAMIN B-12 500 MCG Oral Tab [Pharmacy Med Name: VITAMIN B-12 500 MCG TABLET] 90 tablet 0     Sig: TAKE 1 TABLET BY MOUT
No

## (undated) DEVICE — FORCEP RADIAL JAW 4

## (undated) DEVICE — PERIFIX® 18 GA. X 3-1/2 IN. (90 MM) TUOHY, 5 ML GLASS LUER LOCK LOR TRAY (KIT): Brand: PERIFIX®

## (undated) DEVICE — LINE MNTR ADLT SET O2 INTMD

## (undated) DEVICE — Device: Brand: CUSTOM PROCEDURE KIT

## (undated) DEVICE — Device: Brand: DEFENDO AIR/WATER/SUCTION AND BIOPSY VALVE

## (undated) DEVICE — 35 ML SYRINGE REGULAR TIP: Brand: MONOJECT

## (undated) DEVICE — MEDI-VAC NON-CONDUCTIVE SUCTION TUBING 6MM X 1.8M (6FT.) L: Brand: CARDINAL HEALTH

## (undated) DEVICE — CONMED SCOPE SAVER BITE BLOCK, 20X27 MM: Brand: SCOPE SAVER

## (undated) DEVICE — STERILE POLYISOPRENE POWDER-FREE SURGICAL GLOVES: Brand: PROTEXIS

## (undated) NOTE — LETTER
10/10/2018              Bakari Gonzalez        [facility]         Dear Koffi Adrian,    This letter is to inform you that our office has made several attempts to reach you by phone without success. We were attempting to contact you by phone regarding refill requests. Please contact our office at the number listed below as soon as you receive this letter to discuss this issue and to make the necessary changes in our system to your contact information. Thank you for your cooperation. Sincerely,    Katrin Morales MD  [address] [address]  [phone]        Document electronically generated by:  Sol Go

## (undated) NOTE — LETTER
[facility]  Administration of Anesthesia  1. I, Keshia Ortiz, or _________________________________ acting on her behalf, (Patient) (Dependent/Representative) request to receive anesthesia for my pending procedure/operation/treatment. infections, high spinal block, spinal bleeding, seizure, cardiac arrest and death. 7. AWARENESS: I understand that it is possible (but unlikely) to have explicit memory of events from the operating room while under general anesthesia.   8. ELECTROCONVULSIV unconscious pt /Relationship    My signature below affirms that prior to the time of the procedure, I have explained to the patient and/or his/her guardian, the risks and benefits of undergoing anesthesia, as well as any reasonable alternatives.     _______

## (undated) NOTE — MR AVS SNAPSHOT
[facility] SPECIALTY \[facility]\"" - [place] [address]  [id number] [phone]               Thank you for choosing us for your health care visit with Vianey Dominguez MD.  We are glad to serve you and happy to provide you with this summary o carbamazepine 200 MG Tabs   Take 1 tablet by mouth every morning. Commonly known as:  TEGRETOL           diazepam 10 MG Tabs   Take 10 mg by mouth as needed.    Commonly known as:  VALIUM           ferrous sulfate 325 (65 FE) MG Tbec   Take 1 tablet (325 - spironolactone 25 MG Tabs            Today's Orders     Ortho Referral - External    Complete by:  As directed              Referral Orders      Normal Orders This Visit    ORTHOPEDIC - EXTERNAL [506909 CPT(R)]  Order #:  [id number]         **REFERRAL REQ ? Install grab bars on the bathroom walls beside the tub, shower and toilet. ? Use a non skid rubber mat in the tub/shower. ? If you are unsteady on your feet you may want to use a shower chair/bench and a hand held shower head while bathing/showering. Eat plenty of low-fat dairy products High fat meats and dairy   Choose whole grain products Foods high in sodium   Water is best for hydration Fast food.    Eat at home when possible     Tips for increasing your physical activity – Adults who are physically

## (undated) NOTE — LETTER
02/01/20        Dione Brittle  [address] [facility]      Dear Tally Smoker,    Our records indicate that you have outstanding lab work and or testing that was ordered for you and has not yet been completed:  Orders Placed This [place]

## (undated) NOTE — LETTER
11/26/2019          Neyda Brito  [address] [facility]    Dear Vicente Everett,       Here are the findings from your recent EGD (Upper  Endoscopy) : Biopsies of the esophagus showed acid reflux changes.  No inflammation or infec

## (undated) NOTE — LETTER
EMERGENCY TRANSPORT INFORMATION    Patient:  Lena Hutchins   :  1940   Address:  [address] [place] [address]  Phone:  [phone] (home)    Insurance:  Payor: [facility] MED ADV HMO HUM / Plan: 07 / Product Type: O /     [facility] Impaired activities of daily living     Uncontrolled hypertension     KOBY (acute kidney injury) ([facility]     Polypharmacy     Well controlled type 2 diabetes mellitus ([facility]     Smoker     Impaired mobility     Chronic kidney disease (CKD), stage III (moder

## (undated) NOTE — LETTER
[facility], [place]  Authorization for Invasive Procedures  1.  I hereby authorize Dr. Oziel Lal , my physician and whomever may be designated as the doctor's assistant, to perform the following operation and/or procedure:  David Colmenares performed for the purposes of advancing medicine, science, and/or education, provided my identity is not revealed. If the procedure has been videotaped, the physician/surgeon will obtain the original videotape.  The hospital will not be responsible for stor My signature below affirms that prior to the time of the procedure, I have explained to the patient and/or her legal representative, the risks and benefits involved in the proposed treatment and any reasonable alternative to the proposed treatment.  I have

## (undated) NOTE — LETTER
12/21/2018              Barbara Vigil         Dear Algernon Castleman,    This letter is to inform you that our office has made several attempts to reach you by phone without success.   We were attempting t

## (undated) NOTE — ED AVS SNAPSHOT
[facility] Emergency Department    [address] [address]    Phone:  [phone]    Fax:  [address] [place]   MRN: [id number]    Department:  [facility] Emergency Department   Date of Visit:  6/22 day by Monday 6/26.  May increase to three times a day by Thursday 6/29            Where to Get Your Medications      You can get these medications from any pharmacy     Bring a paper prescription for each of these medications    - gabapentin 300 MG Caps a physician, you may call the [place] [address] Physician Referral and Class Registration line at [phone] or find a doctor online by visiting [email or web address].    IF THERE IS ANY CHANGE OR WORSENING OF YOUR CONDITION, Santiago PRIMARY NUHA Moise - If you are a smoker or have smoked in the last 12 months, we encourage you to explore options for quitting.     - If you have concerns related to behavioral health issues or thoughts of harming yourself, contact [facility] a

## (undated) NOTE — LETTER
12/6/2021              [address] [address]         Dear Margaret Isidro,    This letter is to inform you that our office has made several attempts to reach you by phone without success. We were attempting to contact you by phone regarding your question about receiving the COVID vaccine. It is Dr. Finnegan Cons recommendation that you do get the COVID vaccinations. Please contact our office at the number listed below as soon as you receive this letter to discuss this issue and to make the necessary changes in our system to your contact information. Thank you for your cooperation.         Sincerely,    Ramos Lyn MD  Doctor [facility] [address]  [address]   [phone]        Document electronically generated by:  Susie VANEGAS RN

## (undated) NOTE — ED AVS SNAPSHOT
[facility] Emergency Department    [address] [address]    Phone:  [phone]    Fax:  [address] [place]   MRN: [id number]    Department:  [facility] Emergency Department   Date of Visit:  6/22 and Class Registration line at [phone] or find a doctor online by visiting [email or web address].    IF THERE IS ANY CHANGE OR WORSENING OF YOUR CONDITION, CALL YOUR PRIMARY CARE PHYSICIAN AT ONCE OR RETURN IMMEDIATELY TO [address] [place] [address].     If

## (undated) NOTE — IP AVS SNAPSHOT
[facility]            (For Outpatient Use Only) Initial Admit Date: 7/19/2019   Inpt/Obs Admit Date: Inpt: 7/19/19 / Obs: N/A   Discharge Date:    Juliana Colon:  [de-identified]   MRN: [de-identified]   CSN: [id number]   CEID: [id number]        TRENT July 24, 2019

## (undated) NOTE — IP AVS SNAPSHOT
[facility]            (For Outpatient Use Only) Initial Admit Date: 11/19/2019   Inpt/Obs Admit Date: Inpt: N/A / Obs: 11/19/19   Discharge Date:    Hospital Acct:  [de-identified]   MRN: [de-identified]   CSN: [id number]   CEID: [id number]        E Hospital Account Financial Class: Medicare Advantage    November 23, 2019

## (undated) NOTE — IP AVS SNAPSHOT
Patient Demographics     Address  [facility] [address]  [place] [address] Phone  [phone] (Home) *Preferred*  [phone] [facility])      Emergency Contact(s)     Name Relation Home Work Quinn Sister   [phone]      Allergies as atorvastatin 40 MG Tabs  Commonly known as:  LIPITOR  Next dose due:   Tonight 6/8/18 at bedtime      TAKE 1 TABLET(40 MG) BY MOUTH EVERY NIGHT   Todd Glass MD         Capsaicin in Lidocaine Vehicle 0.25 % Crea  Commonly known as:  ZOSTRIX  Next dose Commonly known as:  LAMICTAL  Next dose due: Tonight 6/8/18 at bedtime      Take 1 tablet by mouth 2 (two) times daily. lidocaine 5 % Ptch  Commonly known as:  LIDODERM  Next dose due:  Saturday 6/9/18      Place 1 patch onto the skin daily.    Vi [id number] PEG 3350 (MIRALAX) powder packet 17 g 06/08/18 0801 Given      [id number] Pantoprazole Sodium (PROTONIX) EC tab 40 mg 06/08/18 0502 Given      [id number] TraMADol HCl (ULTRAM) tab 50 mg 06/07/18 1705 Given      [id number] TraMADol HCl (ULTRAM) tab Procedure Component Value Units Date/Time    Urine Culture, Routine Once [[id number]] Collected:  06/03/18 1403    Order Status:  Completed Lab Status:  Final result Updated:  06/04/18 1513    Specimen:  Urine from Urine, clean catch      Urine Culture No nephrolithiasis. She had a history of brain aneurysm status post coiling 1 of the big aneurysms, but she still had 2 aneurysms, per her report. She has not followed up on any. She had a blunt head injury in the past and her Plavix has been stopped. Anicteric sclerae. Pupils equal and round. NECK:  Supple. No lymphadenopathy. Trachea midline. Full range of motion. LUNGS:  Clear to auscultation bilaterally. Normal respiratory effort. No intercostal retractions.    HEART:  Regular rate and rhythm FB.1 - Felipa Pemberton MD on 6/1/2018  2:53 PM                        Consults - MD Consult Notes      Consults signed by Micki Walter MD at 6/1/2018  4:33 PM     Author:  Micki Walter MD Service:  (none) Author Type:  Physician    Filed:  6/1/2 had admission for heart failure. She had PCI of LAD and RCA at that time. Patient had EGD and colonoscopy done at that time for microcytic anemia. Colonoscopy was unremarkable. EGD showed mild gastritis.       Past Medical History  Past Medical History: Social History  Patient Guardian Status:  Not on file.     Other Topics            Concern  Caffeine Concern        No    Comment:Coffee- 2 cups per day   Exercise                No    Comment:walking    Social History Narrative    The patient does use Wt Readings from Last 5 Encounters:  06/01/18 : 130 lb (59 kg)  06/22/17 : 150 lb (68 kg)  03/02/17 : 164 lb (74.4 kg)  01/05/17 : 163 lb (73.9 kg)  12/20/16 : 155 lb (70.3 kg)      General appearance: alert, appears stated age and cooperative  Head: Normo 2.  CKD stage III  –Check UA urine protein  –Last known creatinine from 2017 was 1.16 mg/dL 9–CKD presumably secondary to hypertensive atherosclerosis especially in light of uncontrolled hypertension and continuing tobacco use  –Strict I's and O's and ej PT Discharge Recommendations: Sub-acute rehabilitation     PLAN  PT Treatment Plan: Bed mobility; Body mechanics; Endurance; Patient education;Gait training;Neuromuscular re-educate;Range of motion;Strengthening;Stair training;Transfer training;Balance traini Stoop/Curb Assistance: Not tested  Comment : Patient able to walk increased distance, signif reliance on BUE. She reports shooting pain still down leg in standing and walking but is better after injection.     Additional information: Patient motivated to re :  Javi Romero PT (Physical Therapist)       Attempted to see pt for PT session, as pt was cleared by RN.  Discussed with pt the importance of proper body mechanics and positioning, as upon entering pt's room, pt was in a slouched, crooked pos Consulted w/ RN prior to seeing pt. Pt was received supine in bed. Pt needed some encouragement initially to participate with therapy. Pt was educated on proper body mechanics during function mobility; pt will need reinforcement.  Pt was educated on log rol to the hospital for further management. \"       Problem List  Principal Problem:    Hypertensive crisis  Active Problems:    Sciatica of right side      Past Medical History  Past Medical History:   Diagnosis Date   • Anemia    • Back problem    • Bladder RW. She gets meals on wheels. Her caregiver brings her additional meals and assists with cleaning and driving. Pt does not drive. SUBJECTIVE  Pt's caregiver is very helpful.      PHYSICAL THERAPY EXAMINATION     OBJECTIVE  Precautions: Bed/chair alarm Patient End of Session: Up in chair;Needs met;Call light within reach;RN aware of session/findings; All patient questions and concerns addressed; Alarm set    CURRENT GOALS    Goals to be met by: 6/19/18  Patient Goal Patient's self-stated goal is: to go dianna educated in adaptive equipment to increase independence in lower body dressing/bathing and to decrease pain. She would benefit from reinforcement. Min A to supine.   Once in bed she completed red theraband exercises, for arm strength necessary for transfe Chair Transfer: Not Tested    Bedroom Mobility: Not tested    BALANCE ASSESSMENT  Static Sitting: good  Dynamic Sitting: fair  Static Standing: Not tested  Dynamic Standing: Not tested    FUNCTIONAL ADL ASSESSMENT  Grooming: not tested  Feeding: not tested time with CG assist for leg support. Pt tolerated sitting EOB 5 min without support and performed sit to stand with CG assist. Pt ambulated to toilet with min assist secondary to fatigue and increased R leg pain, rating 9/10. Nursing and MD aware.  Pt trans -   Eating meals?: None[BA. 2]    AM-PAC Score:[BA.1]  Score: 18  Approx Degree of Impairment: 46.65%  Standardized Score (AM-PAC Scale): 38.66  CMS Modifier (G-Code): CK[BA. 2]    FUNCTIONAL TRANSFER ASSESSMENT[BA.1]  Supine to Sit : Minimum assistance (cg 6/11/2018 3:20 PM Cortez Ramirez MD [facility], [address] [facility]

## (undated) NOTE — ED AVS SNAPSHOT
Fern Norris   MRN: [id number]    Department:  [facility] Emergency Department   Date of Visit:  12/14/2018           Disclosure     Insurance plans vary and the physician(s) referred by the ER may not be covered by your plan.  Please contact within the next three months to obtain basic health screening including reassessment of your blood pressure.     IF THERE IS ANY CHANGE OR WORSENING OF YOUR CONDITION, CALL YOUR PRIMARY CARE PHYSICIAN AT ONCE OR RETURN IMMEDIATELY TO THE EMERGENCY DEPARTMEN

## (undated) NOTE — Clinical Note
March 8, 2017     [address]      Dear Evelyn Aviles:    Below are the results from your recent visit:    normal     Resulted Orders   COMP METABOLIC PANEL (14)   Result Value Ref Range    Glucose 135 (H) 70 Monocyte Absolute 0.3 0.0-1.0 K/UL    Eosinophil Absolute 0.2 0.0-0.7 K/UL    Basophil Absolute 0.1 0.0-0.2 K/UL      If you have any questions or concerns, please don't hesitate to call.     Maikel Cerda M.D.

## (undated) NOTE — ED AVS SNAPSHOT
Aparna Beyer   MRN: [id number]    Department:  [facility] Emergency Department   Date of Visit:  8/15/2019           Disclosure     Insurance plans vary and the physician(s) referred by the ER may not be covered by your plan.  Please contact within the next three months to obtain basic health screening including reassessment of your blood pressure.     IF THERE IS ANY CHANGE OR WORSENING OF YOUR CONDITION, CALL YOUR PRIMARY CARE PHYSICIAN AT ONCE OR RETURN IMMEDIATELY TO THE EMERGENCY DEPARTMEN

## (undated) NOTE — LETTER
7/20/2018              [facility]         Dear Bjorn Azul,    This letter is to inform you that our office has made several attempts to reach you by phone without success.   We were attempting to

## (undated) NOTE — MR AVS SNAPSHOT
[facility] SPECIAL[facility] [place] [address] [place]  [address] [phone]               Thank you for choosing us for your health care visit with Landy Homans, MD.  We are glad to serve you and happy to provide you with this summary o [address] [facility]   Phone:  [phone]   Fax:  [phone]    Diagnoses:  Polypharmacy   Order:  Pharmacist - Internal    EC KELBY   Doctor Anibal [address] [place] [id number]       Comment:  aDHD patient    Claims differences bet clinic staff will provide you with the phone number you should call to schedule your appointment.      If you are confident that your benefit plan will not require a referral or authorization, such as [place], please feel free to schedule your shad Take 500 mg by mouth every 6 (six) hours as needed for Pain. Commonly known as:  TYLENOL EXTRA STRENGTH           aspirin 81 MG Tbec   Take 81 mg by mouth daily.  aspirin 81 mg effervescent tablet           Atorvastatin Calcium 40 MG Tabs   Take 1 tablet [address] [facility], [address] [facility] [address]     Phone:  [phone]    - ferrous sulfate 325 (65 FE) MG Tbec  - hydrALAzine HCl 50 MG Tabs  - Labetalol HCl 300 MG Tabs  - Metoprolol Succinate ER 50 MG Tb24  - naproxen 500 MG Tabs  - Pot ? Cover porch steps with gritty weather proof paint. ? Pay attention to curbs and other changes in surfaces when out in the community. ? Take care when walking on gravel or grassy surfaces. ? Avoid walking on snowy or icy surfaces. ?  Use a cane or walk

## (undated) NOTE — IP AVS SNAPSHOT
Patient Demographics     Address  [place] [address]  [address] [address] Phone  [phone] (Home) *Preferred*  [phone] [facility])      Emergency Contact(s)     Name Relation Home Work Jamel Son   [phone]    simon gastelum hydrALAZINE HCl 100 MG Tabs  Commonly known as:  APRESOLINE      Take 1 tablet (100 mg total) by mouth every 8 (eight) hours. ROSANGELA Boggs         Labetalol HCl 300 MG Tabs  Commonly known as:  Compa Senior  Next dose due:   Tonight [id number] hydrALAZINE HCl (APRESOLINE) tab 100 mg 07/23/19 2240 Given      [id number] hydrALAZINE HCl (APRESOLINE) tab 100 mg 07/24/19 0554 Given      [id number] hydrALAZINE HCl (APRESOLINE) tab 100 mg 07/24/19 1459 Given      [id number] lisinopril (PRINIVIL CO2 31.0 21.0 - 32.0 mmol/L — [facility]   Anion Gap 6 0 - 18 mmol/L — [facility]   BUN 28 7 - 18 mg/dL [facility]   Creatinine 1.47 0.55 - 1.02 mg/dL H [facility]   BUN/CREA Ratio 19.0 10.0 - 20.0 — [facility]   Calcium, Total 9.7 8.5 - 10.1 m Lab - Abbreviation Name Director Address Valid Date Range    [address] [facility] Mane Antunez. Ivelisse Villegas M.D. [facility]. [address]  [facility] [address] 04/29/14 1047 - Present            Microbiology Results (All)     Procedure Component Value Units Da showed fluid overload and pulmonary interstitial edema. Patient was started on p.o. potassium, given p.o. spironolactone and IV Lasix. She will be admitted to the hospital for further management.     PAST MEDICAL HISTORY:  Peripheral arterial disease, sta of money. She denies any cough, chest pain, or abdominal pain. Other 12-point review of systems is negative. PHYSICAL EXAMINATION:    GENERAL:  Alert. Oriented to time, place, and person. Mild distress.    VITAL SIGNS:  Temperature 97.7, pulse 55, FB.1 - Felipa Pemberton MD on 7/19/2019  6:40 PM                        Consults - MD Consult Notes      Consults signed by Seble Tiwari MD at 7/22/2019  4:53 PM     Author:  Seble Tiwari MD Service:  Pulmonology Author Type:  Physician    Filed: • L2-3 grade 1 unstable Retrolisthesis 10/2/2014   • L5-S1 grade 1 unstable Spondylolisthesis with S1 high riding 10/2/2014   • L5-S1 mild-mod central, L3-4 right mild, L2-3 mild central bulging discs 12/8/2014   • Nephrolithiasis    • Osteoarthritis    • Tobacco comment: patient reports smoking 5 cigarettes a day since 2017    Alcohol use: No      Alcohol/week: 0.0 standard drinks    Drug use: No[AR.2]         Current Medications:[AR.1]    Current Facility-Administered Medications:  furosemide (LASIX) aspirin 81 MG Oral Tab EC Take 81 mg by mouth daily.  aspirin 81 mg effervescent tablet   LABETALOL  MG Oral Tab TAKE 1 TABLET(300 MG) BY MOUTH TWICE DAILY   spironolactone 50 MG Oral Tab TAKE ONE TABLET BY MOUTH DAILY   acetaminophen (TYLENOL EXTRA Layering bibasilar pleural effusions with associated compressive atelectasis and/or superimposed pneumonia.      Dictated by (CST): Og Paez MD on 7/21/2019 at 8:58     Approved by (CST): Og Paez MD on 7/21/2019 at 9:00          Nm Lung Vq Ve Andre Gorman Patient Status:  Inpatient    1940 MRN [id number]   [facility] 3W/SW Attending Slim Baker MD   [address] PCP Xenia Rodriguez MD     Date of Admission: 2019      Date of Discharge: 19    Admitting D CV: Heart with regular rate and rhythm  Abd: Abdomen soft, nontender, nondistended, bowel sounds present  Neuro: No acute focal deficits  MSK: Full range of motion in extremities  Skin: Warm and dry  Psych: Normal affect  Ext: no c/c/e      History of Pres #Treatment nonadherence  - Hx right renal artery stenosis s/p stent. Renal arterial Dopper 6/2018 showed no hemodynamically significant right or left renal artery stenosis. - BP 210s/80s on admission. Hospitalized for BP 250s/140s in 6/2018.  Not on med fo Refills:  0     aspirin 81 MG Tbec      Take 81 mg by mouth daily.  aspirin 81 mg effervescent tablet   Refills:  0     atorvastatin 40 MG Tabs  Commonly known as:  LIPITOR      TAKE 1 TABLET BY MOUTH EVERY NIGHT   Quantity:  90 tablet  Refills:  1     Lab vascularity is within normal limits. MEDIAST/VANESSA: No visible mass or adenopathy.  LUNGS/PLEURA: Layering left larger than right pleural effusions are apparent with associated opacity likely reflecting compressive atelectasis, with or without superimposed a Ordering MD: Mikayla Medina  Interpreting physician:       Everett Stevenson MD Sonographer: Curtis Jacobs  CC: Mikayla Medina CC: Sam Penn MD  ------------------------------------------------------------------- Study Conclusions 1.  Left ventricle: 07/20/2019. Study time: 02:02 PM.  Location:  [facility]. ------------------------------------------------------------------- Findings Left ventricle: The cavity size was normal. Wall thickness was increased in a pattern of moderate to severe LVH.  Sy 5.2  LV ID, ES, PLAX                 2.5   cm     ---------- 2.2 - 3.5  LV fx shortening, PLAX          40    %      ---------- 27 - 45  LV ID, major axis, ES,          5.9   cm     ---------- ----------  A4C  LV PW thickness, ED     (H)     1.3   cm     1 3.8  LA volume, ES, 2-p              81    ml     ---------- ----------  LA volume/bsa, ES, 2-p  (H)     46    ml/m^2 ---------- 16 - 34  LA ID, A-P, ES, MM      (H)     4.9   cm     ---------- 2.7 - 3.8  LA/aortic root ratio,           2.04         ------ Author: Susie Corbett [place] Service:  Rehab Author Type:  Physical Therapist    Filed:  7/24/2019 10:26 AM Date of Service:  7/24/2019  9:00 AM Status:  Signed    :   Susie Corbett PT (Physical Therapist)       PHYSICAL THERAPY TREATMENT NOTE - PT recommendation sub-acute rehab at d/c to address deficits and maximize patient independence. DISCHARGE RECOMMENDATIONS  PT Discharge Recommendations: Sub-acute rehabilitation     PLAN  PT Treatment Plan: Bed mobility; Body mechanics; Energy conservati Assistive Device: Rolling walker  Pattern: Shuffle(slow speed)  Stoop/Curb Assistance: Not tested  Comment : supine to sit transfer at SBA, x 2 sit <>stand transfers wtih rolling walker at SBA    Patient End of Session: Up in chair;Needs met;Call light wit Physician Order: See Comment for Specific Order    Presenting Problem: p/w acute on chronic HF, acute hypoxic respiratory failure  Reason for Therapy: Mobility Dysfunction and Discharge Planning    PHYSICAL THERAPY ASSESSMENT     Patient is a 66year old f this level of impairment may benefit from[AO.1] rehab stay[AO.3]. [AO.1] Pt lives alone with limited to no social support. PT recommendation short sub-acute rehab stay at d/c to address deficits and return to prior level of function. [AO.3]     Patient will Past Surgical History  Past Surgical History:   Procedure Laterality Date   • BRAIN SURGERY      7 years ago    • CARPAL TUNNEL RELEASE     • COLONOSCOPY N/A 11/26/2016    Performed by Juani Amaya MD at [address] [place] [address] ENDOSCOPY   • ESOPHAGOGASTRODUODENOSCOPY (EGD) N assistance required to generate solutions and assistance required to implement solutions[AO.3]    RANGE OF MOTION AND STRENGTH ASSESSMENT  Upper extremity ROM and strength are within functional limits     Lower extremity ROM is within functional limits Exercise/Education Provided:[AO.1]  Bed mobility  Body mechanics  Energy conservation  Functional activity tolerated  Gait training  Transfer training  pursed lipped breathing[AO.3]    Patient End of Session: Up in chair;Needs met;RN aware of session/findi OCCUPATIONAL THERAPY EVALUATION - INPATIENT     Room Number: 316/316-A  Evaluation Date: 7/23/2019  Type of Evaluation: Initial[KEELY.1]  Presenting Problem: (copd)[KEELY.2]    Physician Order: IP Consult to Occupational Therapy  Reason for Therapy: ADL/IADL Dys independence and mobility prior to returning home     DISCHARGE RECOMMENDATIONS[KEELY.1]  OT Discharge Recommendations: Sub-acute rehabilitation  OT Device Recommendations: TBD[KEELY.2]    PLAN[KEELY.1]  OT Treatment Plan: Endurance training;Patient/Family educatio Performed by Regis Murdock MD at [address] [facility] ENDOSCOPY   • HYSTERECTOMY     • OTHER SURGICAL HISTORY  2004    Renal Artery Stent   • OTHER SURGICAL HISTORY  2009    Local resection - bladder   • OTHER SURGICAL HISTORY  2013    Removal of kidney stone and stent belén -   Taking care of personal grooming such as brushing teeth?: None  -   Eating meals?: None[KEELY. 2]    AM-PAC Score:[KEELY.1]  Score: 20  Approx Degree of Impairment: 38.32%  Standardized Score (AM-PAC Scale): 42.03  CMS Modifier (G-Code): CJ[KEELY.2]    FUNCTIONA EM H

## (undated) NOTE — LETTER
Trinity Turk  [address] [facility]      Dear El Boateng:    You had previously enrolled in our Chronic Care Management program and had been speaking with your care manager.  We have since made several attempts to reach you by

## (undated) NOTE — LETTER
12/03/18        Guadalupe Toledo  [address] [facility]      Dear Shanel Arcadio records indicate that you have outstanding lab work and or testing that was ordered for you and has not yet been completed:  Orders Placed This Encoun

## (undated) NOTE — ED AVS SNAPSHOT
Vika Johnson   MRN: [id number]    Department:  [facility] Emergency Department   Date of Visit:  6/9/2018           Disclosure     Insurance plans vary and the physician(s) referred by the ER may not be covered by your plan.  Please contact y within the next three months to obtain basic health screening including reassessment of your blood pressure.     IF THERE IS ANY CHANGE OR WORSENING OF YOUR CONDITION, CALL YOUR PRIMARY CARE PHYSICIAN AT ONCE OR RETURN IMMEDIATELY TO THE EMERGENCY DEPARTMEN

## (undated) NOTE — LETTER
8/18/2020              Natalie Wasserman        [address] [place] [id number]         Dear Mariusz Huddleston,    This letter is to inform you that our office has made several attempts to reach you by phone without success.   We were attempting

## (undated) NOTE — LETTER
January 24, 2020    Sheyla Mantilla  [address] [facility]      Dear Eveyln Kang: It was a pleasure speaking with you over the phone recently.  I wanted to send you my contact information for you to utilize when you have a quest